# Patient Record
Sex: FEMALE | Race: BLACK OR AFRICAN AMERICAN | Employment: STUDENT | URBAN - METROPOLITAN AREA
[De-identification: names, ages, dates, MRNs, and addresses within clinical notes are randomized per-mention and may not be internally consistent; named-entity substitution may affect disease eponyms.]

---

## 2017-01-05 ENCOUNTER — TELEPHONE (OUTPATIENT)
Dept: FAMILY MEDICINE CLINIC | Age: 37
End: 2017-01-05

## 2017-01-05 ENCOUNTER — OFFICE VISIT (OUTPATIENT)
Dept: FAMILY MEDICINE CLINIC | Age: 37
End: 2017-01-05

## 2017-01-05 VITALS
BODY MASS INDEX: 45.99 KG/M2 | WEIGHT: 293 LBS | HEIGHT: 67 IN | HEART RATE: 70 BPM | RESPIRATION RATE: 20 BRPM | TEMPERATURE: 98.2 F | SYSTOLIC BLOOD PRESSURE: 128 MMHG | DIASTOLIC BLOOD PRESSURE: 60 MMHG

## 2017-01-05 DIAGNOSIS — H00.19 CHALAZION, UNSPECIFIED LATERALITY: ICD-10-CM

## 2017-01-05 DIAGNOSIS — I10 WELL-CONTROLLED HYPERTENSION: Primary | ICD-10-CM

## 2017-01-05 DIAGNOSIS — R07.9 CHEST PAIN, UNSPECIFIED TYPE: Primary | ICD-10-CM

## 2017-01-05 RX ORDER — ACETAMINOPHEN 500 MG
TABLET ORAL
Qty: 1 KIT | Refills: 0 | Status: SHIPPED | OUTPATIENT
Start: 2017-01-05 | End: 2019-03-04

## 2017-01-05 NOTE — PROGRESS NOTES
HISTORY OF PRESENT ILLNESS  Nevin Ramachandran is a 29 y.o. female. HPI  Pt here for eye follow up. She did not go to [place] [facility], as chalazion cleared up on own. Follow up BP,   normal in office. She had EMS called due to HR woke her up. She has MATEUSZ. Today,  will schedule an appt with EP Dr Gerri Lamas, tho was referred by cardio at appt in 8/2016. She will be evaluated for source of palpitations and irregular HR. Last week, she was  while shopping [employer], legs felt heavy, BP 150s. At [address] Went home and resolved. ROS  ROS negative except for symptoms noted in HPI. Physical Exam  Gen: Oriented to person, place and time and well-developed, well-nourished and in no distress. HEENT:    Head: normocephalic and atraumatic. Eyes:  EOM are normal. Pupils equal and round. Neck:  Normal range of motion. Neck supple. Cardiovascular: normal rate, regular rhythm and normal heart sounds. Pulmonary/Chest:  Effort normal and breath sounds normal.  No respiratory distress. No wheezes, no rales. Abdominal: soft, normal  bowel sounds. Musculoskeletal:  No edema, no tenderness. No calf tenderness or edema. Neurological:  Alert, oriented to person, place and time. Skin: skin is warm and dry. ASSESSMENT and PLAN  Follow-up Disposition:  Return in about 6 months (around 7/5/2017) for follow up hypertension, controlled. 1. Well-controlled hypertension     - Blood Pressure Test Kit-Large kit; Use to check BP at home, goal 140/90 or less  Dispense: 1 Kit; Refill: 0    2. Chalazion, unspecified laterality     I  have discussed the diagnosis with the patient and the intended treatment plan as seen in the above orders. Patient has provided input and agrees with goals. The patient has received an after-visit summary and questions were answered concerning future plans. I have discussed medication side effects and warnings with the patient as well.

## 2017-01-05 NOTE — MR AVS SNAPSHOT
Visit Information Date & Time Provider Department Dept. Phone Encounter #  
 1/5/2017  9:45 AM [place], [address] [phone] [id number] Follow-up Instructions Return in about 6 months (around 7/5/2017) for follow up hypertension, controlled. Your Appointments 1/26/2017  9:20 AM  
ESTABLISHED PATIENT with Luis E Brown MD  
[facility] ([facility]) Appt Note: 6 month follow up  
 320 [facility] [address] [place] [address] [address] Upcoming Health Maintenance Date Due  
 PAP AKA CERVICAL CYTOLOGY 6/20/2002 DTaP/Tdap/Td series (2 - Td) 10/6/2026 Allergies as of 1/5/2017  Review Complete On: 1/5/2017 By: Vishnu Malik LPN Severity Noted Reaction Type Reactions Sulfa (Sulfonamide Antibiotics)  04/03/2016    Rash Current Immunizations  Reviewed on 10/6/2016 No immunizations on file. Not reviewed this visit You Were Diagnosed With   
  
 Codes Comments Well-controlled hypertension    -  Primary ICD-10-CM: I10 
ICD-9-CM: 401.9 Chalazion, unspecified laterality     ICD-10-CM: H00.19 ICD-9-CM: 373.2 Vitals BP Pulse Temp Resp Height(growth percentile) Weight(growth percentile) 128/60 (BP 1 Location: Right arm, BP Patient Position: Sitting) 70 98.2 °F (36.8 °C) (Oral) 20 5' 7\" (1.702 m) (!) 383 lb (173.7 kg) LMP BMI OB Status Smoking Status 12/21/2016 (Approximate) 59.99 kg/m2 Having regular periods Former Smoker Vitals History BMI and BSA Data Body Mass Index Body Surface Area  
 59.99 kg/m 2 2.87 m 2 Preferred Pharmacy Pharmacy Name Phone [id number]- [facility] - [address] [phone] Your Updated Medication List  
  
   
 This list is accurate as of: 1/5/17 10:31 AM.  Always use your most recent med list.  
  
  
  
  
 aspirin delayed-release 81 mg tablet Take 81 mg by mouth daily as needed for Pain. Blood Pressure Test Kit-Large Kit Use to check BP at home, goal 140/90 or less DULoxetine 60 mg capsule Commonly known as:  CYMBALTA Take 1 Cap by mouth daily. For anxiety  
  
 metoprolol tartrate 25 mg tablet Commonly known as:  LOPRESSOR Take 1 Tab by mouth two (2) times a day. Prescriptions Printed Refills Blood Pressure Test Kit-Large kit 0 Sig: Use to check BP at home, goal 140/90 or less Class: Print Follow-up Instructions Return in about 6 months (around 7/5/2017) for follow up hypertension, controlled. Patient Instructions Check blood pressure twice daily and record values. Goal is less than 140/90. Introducing [facility] & HEALTH SERVICES! [employer] introduces [employer] patient portal. Now you can access parts of your medical record, email your doctor's office, and request medication refills online. 1. In your internet browser, go to [email or web address] 2. Click on the First Time User? Click Here link in the Sign In box. You will see the New Member Sign Up page. 3. Enter your [employer] Access Code exactly as it appears below. You will not need to use this code after youve completed the sign-up process. If you do not sign up before the expiration date, you must request a new code. · [employer] Access Code: [email or web address] Expires: 2/20/2017 11:56 AM 
 
4. Enter the last four digits of your Social Security Number (xxxx) and Date of Birth (mm/dd/yyyy) as indicated and click Submit. You will be taken to the next sign-up page. 5. Create a [employer] ID. This will be your [employer] login ID and cannot be changed, so think of one that is secure and easy to remember. 6. Create a [employer] password. You can change your password at any time. 7. Enter your Password Reset Question and Answer. This can be used at a later time if you forget your password. 8. Enter your e-mail address. You will receive e-mail notification when new information is available in [address] 9. Click Sign Up. You can now view and download portions of your medical record. 10. Click the Download Summary menu link to download a portable copy of your medical information. If you have questions, please visit the Frequently Asked Questions section of the [employer] website. Remember, [employer] is NOT to be used for urgent needs. For medical emergencies, dial 911. Now available from your iPhone and Android! Please provide this summary of care documentation to your next provider. Your primary care clinician is listed as Sara Yao. If you have any questions after today's visit, please call [phone].

## 2017-01-05 NOTE — PROGRESS NOTES
Chief Complaint   Patient presents with    Elevated Blood Pressure     Patient is here for elevated blood pressure (153/88 at [facility])  just recently had to call 911 because of her chest pain and heart fluttering, they said she was ok and did not have to go to the ER. States that she is taking her medications as ordered. States that when she is walking she feels really heavy, like her legs are going to give out on her.

## 2017-01-05 NOTE — TELEPHONE ENCOUNTER
Pt called requesting [facility] insurance referral to Dr. Hellen Kidd for appointment 2/9/17 at 9:30 am at [address] faxed to [phone].

## 2017-03-06 RX ORDER — METOPROLOL TARTRATE 25 MG/1
25 TABLET, FILM COATED ORAL 2 TIMES DAILY
Qty: 60 TAB | Refills: 11 | Status: SHIPPED | OUTPATIENT
Start: 2017-03-06 | End: 2017-10-31 | Stop reason: SDUPTHER

## 2017-03-06 NOTE — TELEPHONE ENCOUNTER
----- Message from Maverick Marx sent at 3/6/2017 10:55 AM EST -----  Regarding: Dr Arlen Lemus   Pt needs a refill on (Metoprolol tartrate 25 mg) call into [facility], if you have any question please call [phone].

## 2017-03-08 RX ORDER — METOPROLOL TARTRATE 25 MG/1
25 TABLET, FILM COATED ORAL 2 TIMES DAILY
Qty: 60 TAB | Refills: 11 | Status: CANCELLED | OUTPATIENT
Start: 2017-03-08

## 2017-03-08 NOTE — TELEPHONE ENCOUNTER
Pt called stating she was advised by pharmacy they were still waiting for response from Dr. Ronen Paulino for her refill on Metoprolol. Pt advised Dr. Ronen Paulino filled this with 11 additional refills on 3/6/17.  Ldm

## 2017-04-18 ENCOUNTER — PATIENT OUTREACH (OUTPATIENT)
Dept: FAMILY MEDICINE CLINIC | Age: 37
End: 2017-04-18

## 2017-04-18 NOTE — PROGRESS NOTES
Panel manager place out reach call to patient. Patient has not been in to see PCP and some time and has had about 34 ED visits in the past year. Patient stated that she was currently doing well and had not been to ED in last 3 months. Patient also stated that she has been looking for another physician that has longer appt visits and that is not as busy as her PCP. Patient informed that if she needs longer appointment visits with her PCP she can always request a longer appointment time. Patient agreed that she would.

## 2017-05-31 ENCOUNTER — TELEPHONE (OUTPATIENT)
Dept: FAMILY MEDICINE CLINIC | Age: 37
End: 2017-05-31

## 2017-05-31 NOTE — TELEPHONE ENCOUNTER
Called pt, and left a voice message, that he/she is due for their follow up appointment, here at [facility]. Advised pt to call the office back to schedule their appointment.      ~AWV

## 2017-07-03 ENCOUNTER — TELEPHONE (OUTPATIENT)
Dept: FAMILY MEDICINE CLINIC | Age: 37
End: 2017-07-03

## 2017-07-03 NOTE — TELEPHONE ENCOUNTER
----- Message from Rea Weller sent at 7/3/2017 12:45 PM EDT -----  Regarding: Dr. Killian Lew  Pt is requesting that Medical Release forms be faxed to [phone]. Home [phone].

## 2017-07-03 NOTE — TELEPHONE ENCOUNTER
Pt requesting all her medical records be mailed to her home, was advised of need for signed medical records release, that there will be a charge for records being processed by [employer] Release form faxed to pt per request to [phone].  Danyell

## 2017-08-22 ENCOUNTER — HOSPITAL ENCOUNTER (OUTPATIENT)
Dept: MAMMOGRAPHY | Age: 37
Discharge: HOME | End: 2017-08-22
Attending: FAMILY MEDICINE
Payer: MEDICARE

## 2017-08-22 DIAGNOSIS — Z12.31 VISIT FOR SCREENING MAMMOGRAM: ICD-10-CM

## 2017-08-22 PROCEDURE — 77067 SCR MAMMO BI INCL CAD: CPT

## 2017-09-12 ENCOUNTER — TELEPHONE (OUTPATIENT)
Dept: FAMILY MEDICINE CLINIC | Age: 37
End: 2017-09-12

## 2017-11-02 RX ORDER — METOPROLOL TARTRATE 25 MG/1
25 TABLET, FILM COATED ORAL 2 TIMES DAILY
Qty: 180 TAB | Refills: 0 | Status: SHIPPED | OUTPATIENT
Start: 2017-11-02 | End: 2018-02-10 | Stop reason: SDUPTHER

## 2017-11-02 NOTE — TELEPHONE ENCOUNTER
----- Message from Krissy Puente sent at 11/2/2017 12:45 PM EDT -----  Regarding: Dr. Putnam Left telephone   Cathren Public with [employer] P) [phone] (l) [phone], is requesting a  90 day supply RX for Metotrolol 25mg. Faxed request on 10/30/17 and 11/01/17.

## 2017-11-16 ENCOUNTER — OFFICE VISIT (OUTPATIENT)
Dept: FAMILY MEDICINE CLINIC | Age: 37
End: 2017-11-16

## 2017-11-16 VITALS
TEMPERATURE: 98.4 F | SYSTOLIC BLOOD PRESSURE: 145 MMHG | BODY MASS INDEX: 45.99 KG/M2 | WEIGHT: 293 LBS | DIASTOLIC BLOOD PRESSURE: 78 MMHG | HEART RATE: 76 BPM | HEIGHT: 67 IN | RESPIRATION RATE: 18 BRPM

## 2017-11-16 DIAGNOSIS — R03.0 ELEVATED BLOOD PRESSURE READING: ICD-10-CM

## 2017-11-16 DIAGNOSIS — E66.01 MORBID OBESITY (HCC): ICD-10-CM

## 2017-11-16 DIAGNOSIS — F32.2 DEPRESSION, MAJOR, SINGLE EPISODE, SEVERE (HCC): Primary | ICD-10-CM

## 2017-11-16 RX ORDER — SERTRALINE HYDROCHLORIDE 50 MG/1
50 TABLET, FILM COATED ORAL DAILY
Qty: 30 TAB | Refills: 0 | Status: SHIPPED | OUTPATIENT
Start: 2017-11-16 | End: 2017-12-14 | Stop reason: SDUPTHER

## 2017-11-16 NOTE — PROGRESS NOTES
Chief Complaint   Patient presents with    Welcome To Medicare     1. Have you been to the ER, urgent care clinic since your last visit? No Hospitalized since your last visit? No    2. Have you seen or consulted any other health care providers outside of the [address] [place] [address] since your last visit? Include any pap smears or colon screening.  No    Health Maintenance Due   Topic Date Due    Cervical Cancer Screening  06/20/2002    Flu Vaccine  08/01/2017   Pt declined flu vaccine

## 2017-11-16 NOTE — MR AVS SNAPSHOT
Visit Information Date & Time Provider Department Dept. Phone Encounter #  
 11/16/2017  3:00 PM Debbie CamachoPedro 34 [id number] Follow-up Instructions Return in about 3 weeks (around 12/7/2017) for depression, check blood pressure. Your Appointments 11/29/2017  9:30 AM  
ROUTINE CARE with Debbie Camacho MD  
[address] [place] [address]) Appt Note: Speak about gastric sleeve; r/s from 10/26/17 per provider [address]  
[phone]  
  
   
 [address] [place] [address] [address] [address] Upcoming Health Maintenance Date Due  
 PAP AKA CERVICAL CYTOLOGY 6/20/2002 DTaP/Tdap/Td series (2 - Td) 10/6/2026 Allergies as of 11/16/2017  Review Complete On: 11/16/2017 By: Debbie Camacho MD  
  
 Severity Noted Reaction Type Reactions Sulfa (Sulfonamide Antibiotics)  04/03/2016    Rash Current Immunizations  Reviewed on 10/6/2016 No immunizations on file. Not reviewed this visit You Were Diagnosed With   
  
 Codes Comments Depression, major, single episode, severe ([facility]    -  Primary ICD-10-CM: F32.2 ICD-9-CM: 296.23 Morbid obesity ([facility]     ICD-10-CM: E66.01 
ICD-9-CM: 278.01 Elevated blood pressure reading     ICD-10-CM: R03.0 ICD-9-CM: 796.2 Vitals BP Pulse Temp Resp Height(growth percentile) Weight(growth percentile) 145/78 76 98.4 °F (36.9 °C) (Oral) 18 5' 7\" (1.702 m) (!) 388 lb (176 kg) BMI OB Status Smoking Status 60.77 kg/m2 Unknown Former Smoker Vitals History BMI and BSA Data Body Mass Index Body Surface Area 60.77 kg/m 2 2.88 m 2 Preferred Pharmacy Pharmacy Name Phone [id number] [address] [phone] Your Updated Medication List  
  
   
 This list is accurate as of: 11/16/17  4:19 PM.  Always use your most recent med list.  
  
  
  
  
 aspirin delayed-release 81 mg tablet Take 81 mg by mouth daily as needed for Pain. Blood Pressure Test Kit-Large Kit Use to check BP at home, goal 140/90 or less  
  
 metoprolol tartrate 25 mg tablet Commonly known as:  LOPRESSOR Take 1 Tab by mouth two (2) times a day. sertraline 50 mg tablet Commonly known as:  ZOLOFT Take 1 Tab by mouth daily. Prescriptions Sent to Pharmacy Refills  
 sertraline (ZOLOFT) 50 mg tablet 0 Sig: Take 1 Tab by mouth daily. Class: Normal  
 [facility] #: [phone] Route: Oral  
  
We Performed the Following REFERRAL TO PSYCHOLOGY [SPO05 Custom] Comments:  
 Depression. PLEASE REFER TO DR. Elisabeth mora, [phone]. Follow-up Instructions Return in about 3 weeks (around 12/7/2017) for depression, check blood pressure. Referral Information Referral ID Referred By Referred To  
  
 [id number] Clearance Matty Not Available Visits Status Start Date End Date 1 New Request 11/16/17 11/16/18 If your referral has a status of pending review or denied, additional information will be sent to support the outcome of this decision. Introducing [facility] & HEALTH SERVICES! [employer] introduces [employer] patient portal. Now you can access parts of your medical record, email your doctor's office, and request medication refills online. 1. In your internet browser, go to [email or web address] 2. Click on the First Time User? Click Here link in the Sign In box. You will see the New Member Sign Up page. 3. Enter your [employer] Access Code exactly as it appears below. You will not need to use this code after youve completed the sign-up process.  If you do not sign up before the expiration date, you must request a new code. · [employer] Access Code: [email or web address] Expires: 11/19/2017  8:28 AM 
 
4. Enter the last four digits of your Social Security Number (xxxx) and Date of Birth (mm/dd/yyyy) as indicated and click Submit. You will be taken to the next sign-up page. 5. Create a Muzooka ID. This will be your M[employer] login ID and cannot be changed, so think of one that is secure and easy to remember. 6. Create a Muzooka password. You can change your password at any time. 7. Enter your Password Reset Question and Answer. This can be used at a later time if you forget your password. 8. Enter your e-mail address. You will receive e-mail notification when new information is available in [address] 9. Click Sign Up. You can now view and download portions of your medical record. 10. Click the Download Summary menu link to download a portable copy of your medical information. If you have questions, please visit the Frequently Asked Questions section of the Muzook[employer] website. Remember, M[employer] is NOT to be used for urgent needs. For medical emergencies, dial 911. Now available from your iPhone and Android! Please provide this summary of care documentation to your next provider. Your primary care clinician is listed as Evangelina Penaloza. If you have any questions after today's visit, please call [phone].

## 2017-11-16 NOTE — PROGRESS NOTES
HISTORY OF PRESENT ILLNESS  Dayana Bingham is a 40 y.o. female. HPI Comments: Dayana Bingham is here for a [employer] Exam, however, her PHQ-9 was extremely positive, which needs to be taken care of today. Depression   The history is provided by the patient. This is a chronic problem. Episode onset: about a year ago. The problem occurs constantly. The problem has been gradually worsening. Pertinent negatives include no chest pain and no shortness of breath. Exacerbated by: her relationship with God, not being successful, her weight, not being able to get anything done. Relieved by: watching TV. She has tried nothing for the symptoms. Review of Systems   Constitutional: Negative for malaise/fatigue and weight loss. No weight gain   Respiratory: Negative for shortness of breath. Cardiovascular: Positive for palpitations. Negative for chest pain. Psychiatric/Behavioral: Positive for depression. Negative for substance abuse and suicidal ideas. The patient is nervous/anxious and has insomnia. \"I feel like I ought not be here. \"       Visit Vitals    /78    Pulse 76    Temp 98.4 °F (36.9 °C) (Oral)    Resp 18    Ht 5' 7\" (1.702 m)    Wt (!) 388 lb (176 kg)    BMI 60.77 kg/m2     BP Readings from Last 3 Encounters:   11/16/17 145/78   01/05/17 128/60   12/15/16 132/78     Physical Exam   Constitutional: She is oriented to person, place, and time. She appears well-developed and well-nourished. No distress. Neurological: She is alert and oriented to person, place, and time. Skin: She is not diaphoretic. Psychiatric: Her speech is normal and behavior is normal. Thought content normal. She exhibits a depressed mood. Tearful at times       ASSESSMENT and PLAN    ICD-10-CM ICD-9-CM    1. Depression, major, single episode, severe ([facility]) F32.2 296.23 sertraline (ZOLOFT) 50 mg tablet      REFERRAL TO PSYCHOLOGY   2. Morbid obesity ([facility]) E66.01 278.01    3.  Elevated blood pressure reading R03.0 796.2         Severe depression  Start Zoloft  Psychology referral  I have reviewed/discussed the above normal BMI with the patient. I have recommended the following interventions: encourage exercise . Samy Marte Follow-up Disposition:  Return in about 3 weeks (around 12/7/2017) for depression, check blood pressure. Reviewed plan of care. Patient has provided input and agrees with goals.

## 2017-11-21 ENCOUNTER — TELEPHONE (OUTPATIENT)
Dept: FAMILY MEDICINE CLINIC | Age: 37
End: 2017-11-21

## 2017-11-21 DIAGNOSIS — F32.2 DEPRESSION, MAJOR, SINGLE EPISODE, SEVERE (HCC): Primary | ICD-10-CM

## 2017-11-21 NOTE — TELEPHONE ENCOUNTER
Called and spoke with pt, and she has been given the number for the [facility] to call and schedule appointment. Pt will call back once appointment has been scheduled.      Please sign off on referral.

## 2017-11-21 NOTE — TELEPHONE ENCOUNTER
States Dr Judy Reza is not in-network with her Insurance. Would you recommend someone else? She will check with her Insurance as well to see who is in-network.  Please call [phone] to discuss

## 2017-11-21 NOTE — TELEPHONE ENCOUNTER
Pt called the office back and gave 2 office number for providers, however, did not have a provider name, besides one on the providers. [phone]- located off [facility]  [phone] Dr. Fabi Olmos    Pt advised we will try referring her to a Sharad Colon provider and call her back with the referral information. Pt states understanding.

## 2017-11-29 ENCOUNTER — TELEPHONE (OUTPATIENT)
Dept: FAMILY MEDICINE CLINIC | Age: 37
End: 2017-11-29

## 2017-12-05 ENCOUNTER — OFFICE VISIT (OUTPATIENT)
Dept: CARDIOLOGY CLINIC | Age: 37
End: 2017-12-05

## 2017-12-05 VITALS
WEIGHT: 293 LBS | SYSTOLIC BLOOD PRESSURE: 132 MMHG | BODY MASS INDEX: 45.99 KG/M2 | OXYGEN SATURATION: 97 % | HEIGHT: 67 IN | HEART RATE: 71 BPM | DIASTOLIC BLOOD PRESSURE: 76 MMHG

## 2017-12-05 DIAGNOSIS — F41.1 GAD (GENERALIZED ANXIETY DISORDER): ICD-10-CM

## 2017-12-05 DIAGNOSIS — Z99.89 OSA ON CPAP: ICD-10-CM

## 2017-12-05 DIAGNOSIS — R73.03 PREDIABETES: ICD-10-CM

## 2017-12-05 DIAGNOSIS — R07.89 ATYPICAL CHEST PAIN: ICD-10-CM

## 2017-12-05 DIAGNOSIS — F32.2 DEPRESSION, MAJOR, SINGLE EPISODE, SEVERE (HCC): ICD-10-CM

## 2017-12-05 DIAGNOSIS — Z87.891 FORMER HEAVY TOBACCO SMOKER: ICD-10-CM

## 2017-12-05 DIAGNOSIS — E66.01 MORBID OBESITY (HCC): ICD-10-CM

## 2017-12-05 DIAGNOSIS — R00.2 RAPID PALPITATIONS: Primary | ICD-10-CM

## 2017-12-05 DIAGNOSIS — G47.33 OSA ON CPAP: ICD-10-CM

## 2017-12-05 NOTE — MR AVS SNAPSHOT
Visit Information Date & Time Provider Department Dept. Phone Encounter #  
 12/5/2017  9:30 AM Mackenzie Rosenberg MD [facility] [place] [address] [id number] Your Appointments 12/7/2017  1:15 PM  
ROUTINE CARE with Elsa Curtis MD  
[address] [facility]) Appt Note: 3 week follow up depression [address]  
[phone]  
  
   
 [address] [place] [address] [address] [address] Upcoming Health Maintenance Date Due  
 PAP AKA CERVICAL CYTOLOGY 6/20/2002 DTaP/Tdap/Td series (2 - Td) 10/6/2026 Allergies as of 12/5/2017  Review Complete On: 12/5/2017 By: Adryan Spence Severity Noted Reaction Type Reactions Sulfa (Sulfonamide Antibiotics)  04/03/2016    Rash Current Immunizations  Reviewed on 10/6/2016 No immunizations on file. Not reviewed this visit You Were Diagnosed With   
  
 Codes Comments Rapid palpitations    -  Primary ICD-10-CM: R00.2 ICD-9-CM: 785.1 Atypical chest pain     ICD-10-CM: R07.89 ICD-9-CM: 786.59 Former heavy tobacco smoker     ICD-10-CM: N52.558 ICD-9-CM: V15.82 MEGHAN (generalized anxiety disorder)     ICD-10-CM: F41.1 ICD-9-CM: 300.02 Prediabetes     ICD-10-CM: R73.03 
ICD-9-CM: 790.29 Morbid obesity (HCC)     ICD-10-CM: E66.01 
ICD-9-CM: 278.01   
 MATEUSZ on CPAP     ICD-10-CM: G47.33, Z99.89 ICD-9-CM: 327.23, V46.8 Vitals BP Pulse Height(growth percentile) Weight(growth percentile) SpO2 BMI  
 132/76 71 5' 7\" (1.702 m) (!) 388 lb 3.2 oz (176.1 kg) 97% 60.8 kg/m2 OB Status Smoking Status Unknown Former Smoker Vitals History BMI and BSA Data Body Mass Index Body Surface Area 60.8 kg/m 2 2.89 m 2 Preferred Pharmacy Pharmacy Name Phone  [id number] [place] [phone] Your Updated Medication List  
  
   
This list is accurate as of: 12/5/17 10:17 AM.  Always use your most recent med list.  
  
  
  
  
 aspirin delayed-release 81 mg tablet Take 81 mg by mouth daily as needed for Pain. Blood Pressure Test Kit-Large Kit Use to check BP at home, goal 140/90 or less  
  
 metoprolol tartrate 25 mg tablet Commonly known as:  LOPRESSOR Take 1 Tab by mouth two (2) times a day. sertraline 50 mg tablet Commonly known as:  ZOLOFT Take 1 Tab by mouth daily. We Performed the Following AMB POC EKG ROUTINE W/ 12 LEADS, INTER & REP [04643 CPT(R)] Introducing [facility] & HEALTH SERVICES! [facility] introduces [employer] patient portal. Now you can access parts of your medical record, email your doctor's office, and request medication refills online. 1. In your internet browser, go to [email or web address] 2. Click on the First Time User? Click Here link in the Sign In box. You will see the New Member Sign Up page. 3. Enter your [employer] Access Code exactly as it appears below. You will not need to use this code after youve completed the sign-up process. If you do not sign up before the expiration date, you must request a new code. · [employer] Access Code: [email or web address] Expires: 3/5/2018 10:17 AM 
 
4. Enter the last four digits of your Social Security Number (xxxx) and Date of Birth (mm/dd/yyyy) as indicated and click Submit. You will be taken to the next sign-up page. 5. Create a [email or web address] ID. This will be your Qranio login ID and cannot be changed, so think of one that is secure and easy to remember. 6. Create a Qranio password. You can change your password at any time. 7. Enter your Password Reset Question and Answer. This can be used at a later time if you forget your password. 8. Enter your e-mail address.  You will receive e-mail notification when new information is available in [employer]. 9. Click Sign Up. You can now view and download portions of your medical record. 10. Click the Download Summary menu link to download a portable copy of your medical information. If you have questions, please visit the Frequently Asked Questions section of the [employer] website. Remember, [employer] is NOT to be used for urgent needs. For medical emergencies, dial 911. Now available from your iPhone and Android! Please provide this summary of care documentation to your next provider. Your primary care clinician is listed as Molly Quigley. If you have any questions after today's visit, please call [phone].

## 2017-12-05 NOTE — PROGRESS NOTES
[facility]   [address] [facility], [address] [place] [address] Road                                          NEW PATIENT HPI/FOLLOW-UP      NAME:  Grecia Fontenot   :   1980   MRN:   [id number]   PCP:  Lor Wilcox MD           Subjective: The patient is a 40y.o. year old female  who returns for a routine follow-up. Since the last visit ,  patient reports no new symptoms but change in pattern of rapid palpitations. States in last 6 months has had 5-6 episodes which continue to be of sudden onset at anytime but are lasting longer in duration up to 4-5 minutes before gradually resolving. Are associated with urge to urinate but now occur while urinating. Few days ago had epigastric squeezing with gas several times successively and suddenly stopped. No associated palpitations. Being followed for depression anxiety in [facility]. Denies change in exercise tolerance, edema, medication intolerance, PND/orthopnea wheezing, sputum, syncope, dizziness or light headedness. Review of Systems  General: Pt denies excessive weight gain or loss. Pt is able to conduct ADL's. Respiratory:  +PIZANO, -wheezing or stridor.   Cardiovascular: Denies precordial pain, +palpitations, -edema or PND  Gastrointestinal: Denies poor appetite, indigestion, abdominal pain or blood in stool  Peripheral vascular: Denies claudication, leg cramps  Neuropsychiatric: Denies paresthesias,tingling,numbness,+anxiety,+depression,fatigue  Musculoskeletal: Denies pain,tenderness, soreness,swelling      Past Medical History:   Diagnosis Date    Depression, major, single episode, severe ([address] 2017    Morbid obesity ([address] 2016    Racing heart beat     Sleep apnea     Tracheostomy dependence ([address]     for severe sleep apnea      Patient Active Problem List    Diagnosis Date Noted    Depression, major, single episode, severe ([address] 2017    Former heavy tobacco smoker 2016    MEGHAN (generalized anxiety disorder) 08/13/2016    Prediabetes 08/13/2016    Establishing care with new doctor, encounter for 08/10/2016    Atypical chest pain 08/10/2016    Rapid palpitations 08/10/2016    Morbid obesity ([facility] 04/18/2016    MATEUSZ on CPAP 04/18/2016    Tracheostomy present ([facility] 04/18/2016      Past Surgical History:   Procedure Laterality Date    HX GYN  2002    removal of benign ovarian cyst    HX TRACHEOSTOMY  8/26/13    Due to sleep apnea     Allergies   Allergen Reactions    Sulfa (Sulfonamide Antibiotics) Rash      Family History   Problem Relation Age of Onset    Diabetes Mother     Hypertension Mother     Heart Disease Maternal Grandmother     No Known Problems Father       Social History     Social History    Marital status: SINGLE     Spouse name: N/A    Number of children: N/A    Years of education: N/A     Occupational History    Not on file. Social History Main Topics    Smoking status: Former Smoker     Packs/day: 2.00     Years: 10.00    Smokeless tobacco: Never Used    Alcohol use No    Drug use: No      Comment: Smoked Marijuana in [id number], but not current user. -vr    Sexual activity: No     Other Topics Concern    Not on file     Social History Narrative      Current Outpatient Prescriptions   Medication Sig    sertraline (ZOLOFT) 50 mg tablet Take 1 Tab by mouth daily.  metoprolol tartrate (LOPRESSOR) 25 mg tablet Take 1 Tab by mouth two (2) times a day.  Blood Pressure Test Kit-Large kit Use to check BP at home, goal 140/90 or less    aspirin delayed-release 81 mg tablet Take 81 mg by mouth daily as needed for Pain. No current facility-administered medications for this visit. I have reviewed the nurses notes, vitals, problem list, allergy list, medical history, family medical, social history and medications.         Objective:     Physical Exam:     Vitals:    12/05/17 0930   BP: 132/76   Pulse: 71   SpO2: 97%   Weight: (!) 388 lb 3.2 oz (176.1 kg)   Height: 5' 7\" (1.702 m)    Body mass index is 60.8 kg/(m^2). General: Well developed, morbidly obese in no acute distress. Chronic trach in. HEENT: No carotid bruits, no JVD, trach is midline. Heart:  Normal S1/S2 negative S3 or S4. Regular, no murmur, gallop or rub.   Respiratory: Clear bilaterally, no wheezing or rales  Abdomen:   Soft, non-tender, bowel sounds are active.   Extremities:  No edema, normal cap refill, no cyanosis. Neuro: A&Ox3, speech clear, gait stable. Skin: Skin color is normal. No rashes or lesions. No diaphoresis.   Vascular: 2+ pulses symmetric in all extremities        Data Review:       Cardiographics:    EKG: NSR with sinus arrhythmia, LVH    Cardiology Labs:    Results for orders placed or performed during the hospital encounter of 07/30/16   EKG, 12 LEAD, INITIAL   Result Value Ref Range    Ventricular Rate 88 BPM    Atrial Rate 88 BPM    P-R Interval 164 ms    QRS Duration 102 ms    Q-T Interval 366 ms    QTC Calculation (Bezet) 442 ms    Calculated P Axis 54 degrees    Calculated R Axis -25 degrees    Calculated T Axis 15 degrees    Diagnosis       Normal sinus rhythm  Voltage criteria for left ventricular hypertrophy  When compared with ECG of 02-JUL-2016 00:40,  No significant change was found  Confirmed by Raffi Chawla MD, Deedee [place] ([id number]) on 7/31/2016 3:31:53 PM         Lab Results   Component Value Date/Time    Cholesterol, total 138 04/20/2016 12:24 PM    HDL Cholesterol 51 04/20/2016 12:24 PM    LDL, calculated 78 04/20/2016 12:24 PM    Triglyceride 47 04/20/2016 12:24 PM       Lab Results   Component Value Date/Time    Sodium 137 11/03/2016 03:54 PM    Potassium 4.4 11/03/2016 03:54 PM    Chloride 100 11/03/2016 03:54 PM    CO2 21 11/03/2016 03:54 PM    Anion gap 8 07/30/2016 05:45 PM    Glucose 92 11/03/2016 03:54 PM    BUN 10 11/03/2016 03:54 PM    Creatinine 0.80 11/03/2016 03:54 PM    BUN/Creatinine ratio 13 11/03/2016 03:54 PM    GFR est  11/03/2016 03:54 PM    GFR est non-AA 96 11/03/2016 03:54 PM    Calcium 9.3 11/03/2016 03:54 PM    Bilirubin, total 0.5 11/03/2016 03:54 PM    AST (SGOT) 19 11/03/2016 03:54 PM    Alk. phosphatase 73 11/03/2016 03:54 PM    Protein, total 7.7 11/03/2016 03:54 PM    Albumin 4.2 11/03/2016 03:54 PM    Globulin 4.6 07/30/2016 05:45 PM    A-G Ratio 1.2 11/03/2016 03:54 PM    ALT (SGPT) 20 11/03/2016 03:54 PM          Assessment:       ICD-10-CM ICD-9-CM    1. Rapid palpitations R00.2 785.1 AMB POC EKG ROUTINE W/ 12 LEADS, INTER & REP   2. Atypical chest pain R07.89 786.59 AMB POC EKG ROUTINE W/ 12 LEADS, INTER & REP   3. Former heavy tobacco smoker Z87.891 V15.82    4. MEGHAN (generalized anxiety disorder) F41.1 300.02    5. Prediabetes R73.03 790.29    6. Morbid obesity ([address] E66.01 278.01    7. MATEUSZ on CPAP G47.33 327.23     Z99.89 V46.8    8. Depression, major, single episode, severe ([address] F32.2 296.23          Discussion: Patient presents at this time with exacerbation in rapid palpitations. Not more frequent but more severe lasting longer in duration. Will refer back to Dr Zohreh Mathur who saw in 6/16 to discuss SVT(long RP tachycardia) ablation suggested then. ? ILR. Meanwhile continue BB. Discussed with Mother who accompanies her today. If recurrent prolonged palpitations > 10-15 minutes then call EMS. Plan: 1. Continue same meds. Lipid profile and labs followed by PCP. 2.Encouraged to exercise to tolerance, lose weight and follow low fat, low cholesterol, low sodium predominantly Plant-based (consider Mediterranean) diet. Call with questions or concerns. Will follow up any test results by phone and/or f/u here in office if needed. Neo Solorzano 3.Follow up: 1 month    I have discussed the diagnosis with the patient and the intended plan as seen in the above orders. The patient has received an after-visit summary and questions were answered concerning future plans.   I have discussed any concerning medication side effects and warnings with the patient as well.     Edgar Cisneros MD  12/5/2017

## 2017-12-07 ENCOUNTER — OFFICE VISIT (OUTPATIENT)
Dept: FAMILY MEDICINE CLINIC | Age: 37
End: 2017-12-07

## 2017-12-07 VITALS
SYSTOLIC BLOOD PRESSURE: 146 MMHG | BODY MASS INDEX: 45.99 KG/M2 | RESPIRATION RATE: 18 BRPM | TEMPERATURE: 98.8 F | WEIGHT: 293 LBS | HEIGHT: 67 IN | HEART RATE: 62 BPM | DIASTOLIC BLOOD PRESSURE: 80 MMHG

## 2017-12-07 DIAGNOSIS — F32.2 DEPRESSION, MAJOR, SINGLE EPISODE, SEVERE (HCC): Primary | ICD-10-CM

## 2017-12-07 DIAGNOSIS — R03.0 ELEVATED BLOOD PRESSURE READING: ICD-10-CM

## 2017-12-07 DIAGNOSIS — K04.7 DENTAL ABSCESS: ICD-10-CM

## 2017-12-07 RX ORDER — CLINDAMYCIN HYDROCHLORIDE 300 MG/1
300 CAPSULE ORAL 3 TIMES DAILY
Qty: 30 CAP | Refills: 0 | Status: SHIPPED | OUTPATIENT
Start: 2017-12-07 | End: 2018-01-31

## 2017-12-07 NOTE — PROGRESS NOTES
HISTORY OF PRESENT ILLNESS  Kush Lundy is a 40 y.o. female. Depression   The history is provided by the patient. This is a chronic problem. The problem occurs constantly. The problem has been gradually improving. Pertinent negatives include no chest pain and no shortness of breath. Nothing aggravates the symptoms. The symptoms are relieved by medications. Treatments tried: Zoloft. The treatment provided significant relief. Other   The history is provided by the patient (she thinks she has a dental abscess). Pertinent negatives include no chest pain and no shortness of breath. Review of Systems   Constitutional: Negative for malaise/fatigue and weight loss. Weight loss   HENT:        Tooth pain   Respiratory: Negative for shortness of breath. Cardiovascular: Negative for chest pain and palpitations. Psychiatric/Behavioral: Negative for depression, substance abuse and suicidal ideas. The patient is not nervous/anxious and does not have insomnia. Visit Vitals    /80    Pulse 62    Temp 98.8 °F (37.1 °C) (Oral)    Resp 18    Ht 5' 7\" (1.702 m)    Wt (!) 394 lb (178.7 kg)    BMI 61.71 kg/m2     BP Readings from Last 3 Encounters:   12/07/17 146/80   12/05/17 132/76   11/16/17 145/78     Physical Exam   Constitutional: She is oriented to person, place, and time. She appears well-developed and well-nourished. No distress. HENT:   Mouth/Throat:       Neurological: She is alert and oriented to person, place, and time. She displays no tremor. Skin: She is not diaphoretic. Psychiatric: She has a normal mood and affect. Her behavior is normal. Judgment and thought content normal.   Very happy and relaxed       ASSESSMENT and PLAN    ICD-10-CM ICD-9-CM    1. Depression, major, single episode, severe ([facility] F32.2 296.23    2. Elevated blood pressure reading R03.0 796.2    3.  Dental abscess K04.7 522.5 clindamycin (CLEOCIN) 300 mg capsule        Depression doing well  Blood pressure elevated today  Draining dental abscess  Continue current plans. Cleocin  Dental appointment right away    Follow-up Disposition:  Return in about 3 months (around 3/7/2018) for Medicare Wellness, check blood pressure. Reviewed plan of care. Patient has provided input and agrees with goals.

## 2017-12-07 NOTE — MR AVS SNAPSHOT
Visit Information Date & Time Provider Department Dept. Phone Encounter #  
 12/7/2017  1:15 PM Myriam Hirsch [address] [id number] Follow-up Instructions Return in about 3 months (around 3/7/2018) for Medicare Wellness, check blood pressure. Your Appointments 1/10/2018 11:00 AM  
ESTABLISHED PATIENT with Jose Cruz Matson MD  
[facility] ([address]) Appt Note: fu appt Dx Palps   sll [address] [facility] [address] [facility] [address]  
[phone]  
  
   
 [address] [facility] [address] [facility] [address]  
  
    
 1/16/2018 10:15 AM  
ESTABLISHED PATIENT with Michela Bermudez MD  
[facility]) Appt Note: 1 MO. F/U  
 [address] [place]  
[phone] [address] Upcoming Health Maintenance Date Due  
 PAP AKA CERVICAL CYTOLOGY 6/20/2002 DTaP/Tdap/Td series (2 - Td) 10/6/2026 Allergies as of 12/7/2017  Review Complete On: 12/7/2017 By: Myriam Hirsch MD  
  
 Severity Noted Reaction Type Reactions Sulfa (Sulfonamide Antibiotics)  04/03/2016    Rash Current Immunizations  Reviewed on 10/6/2016 No immunizations on file. Not reviewed this visit You Were Diagnosed With   
  
 Codes Comments Depression, major, single episode, severe ([facility]    -  Primary ICD-10-CM: F32.2 ICD-9-CM: 296.23 Elevated blood pressure reading     ICD-10-CM: R03.0 ICD-9-CM: 796.2 Dental abscess     ICD-10-CM: K04.7 ICD-9-CM: 522.5 Vitals BP Pulse Temp Resp Height(growth percentile) Weight(growth percentile) 146/80 62 98.8 °F (37.1 °C) (Oral) 18 5' 7\" (1.702 m) (!) 394 lb (178.7 kg) BMI OB Status Smoking Status 61.71 kg/m2 Unknown Former Smoker Vitals History BMI and BSA Data Body Mass Index Body Surface Area 61.71 kg/m 2 2.91 m 2 Preferred Pharmacy Pharmacy Name Phone [facility]/PHARMACY #[id number] [place] [address] [phone] Your Updated Medication List  
  
   
This list is accurate as of: 12/7/17  1:59 PM.  Always use your most recent med list.  
  
  
  
  
 aspirin delayed-release 81 mg tablet Take 81 mg by mouth daily as needed for Pain. Blood Pressure Test Kit-Large Kit Use to check BP at home, goal 140/90 or less  
  
 clindamycin 300 mg capsule Commonly known as:  CLEOCIN Take 1 Cap by mouth three (3) times daily. metoprolol tartrate 25 mg tablet Commonly known as:  LOPRESSOR Take 1 Tab by mouth two (2) times a day. sertraline 50 mg tablet Commonly known as:  ZOLOFT Take 1 Tab by mouth daily. Prescriptions Sent to Pharmacy Refills  
 clindamycin (CLEOCIN) 300 mg capsule 0 Sig: Take 1 Cap by mouth three (3) times daily. Class: Normal  
 Pharmacy: [facility]/pharmacy [address] [place] [address]  #: [phone] Route: Oral  
  
Follow-up Instructions Return in about 3 months (around 3/7/2018) for Medicare Wellness, check blood pressure. Introducing [facility]! Naida Araya introduces [employer] patient portal. Now you can access parts of your medical record, email your doctor's office, and request medication refills online. 1. In your internet browser, go to [email or web address] 2. Click on the First Time User? Click Here link in the Sign In box. You will see the New Member Sign Up page. 3. Enter your [employer] Access Code exactly as it appears below. You will not need to use this code after youve completed the sign-up process. If you do not sign up before the expiration date, you must request a new code. · [employer] Access Code: [email or web address] Expires: 3/5/2018 10:17 AM 
 
4. Enter the last four digits of your Social Security Number (xxxx) and Date of Birth (mm/dd/yyyy) as indicated and click Submit. You will be taken to the next sign-up page. 5. Create a YABUY ID. This will be your YABUY login ID and cannot be changed, so think of one that is secure and easy to remember. 6. Create a YABUY password. You can change your password at any time. 7. Enter your Password Reset Question and Answer. This can be used at a later time if you forget your password. 8. Enter your e-mail address. You will receive e-mail notification when new information is available in [address] 9. Click Sign Up. You can now view and download portions of your medical record. 10. Click the Download Summary menu link to download a portable copy of your medical information. If you have questions, please visit the Frequently Asked Questions section of the YABUY website. Remember, YABUY is NOT to be used for urgent needs. For medical emergencies, dial 911. Now available from your iPhone and Android! Please provide this summary of care documentation to your next provider. Your primary care clinician is listed as Monika Fagan. If you have any questions after today's visit, please call [phone].

## 2017-12-14 DIAGNOSIS — F32.2 DEPRESSION, MAJOR, SINGLE EPISODE, SEVERE (HCC): ICD-10-CM

## 2017-12-17 RX ORDER — SERTRALINE HYDROCHLORIDE 50 MG/1
TABLET, FILM COATED ORAL
Qty: 30 TAB | Refills: 3 | Status: SHIPPED | OUTPATIENT
Start: 2017-12-17 | End: 2018-07-10 | Stop reason: SDDI

## 2018-01-31 ENCOUNTER — OFFICE VISIT (OUTPATIENT)
Dept: FAMILY MEDICINE CLINIC | Age: 38
End: 2018-01-31

## 2018-01-31 VITALS
DIASTOLIC BLOOD PRESSURE: 81 MMHG | BODY MASS INDEX: 45.99 KG/M2 | HEART RATE: 71 BPM | SYSTOLIC BLOOD PRESSURE: 126 MMHG | HEIGHT: 67 IN | WEIGHT: 293 LBS | RESPIRATION RATE: 18 BRPM | TEMPERATURE: 98.3 F

## 2018-01-31 DIAGNOSIS — R00.2 PALPITATIONS: ICD-10-CM

## 2018-01-31 DIAGNOSIS — R73.03 PREDIABETES: ICD-10-CM

## 2018-01-31 DIAGNOSIS — F32.2 DEPRESSION, MAJOR, SINGLE EPISODE, SEVERE (HCC): ICD-10-CM

## 2018-01-31 DIAGNOSIS — Z13.220 SCREENING CHOLESTEROL LEVEL: ICD-10-CM

## 2018-01-31 DIAGNOSIS — E66.01 MORBID OBESITY (HCC): ICD-10-CM

## 2018-01-31 DIAGNOSIS — Z00.00 MEDICARE ANNUAL WELLNESS VISIT, SUBSEQUENT: Primary | ICD-10-CM

## 2018-01-31 DIAGNOSIS — Z13.0 SCREENING FOR BLOOD DISEASE: ICD-10-CM

## 2018-01-31 DIAGNOSIS — Z93.0 TRACHEOSTOMY PRESENT (HCC): ICD-10-CM

## 2018-01-31 RX ORDER — AMOXICILLIN 500 MG/1
CAPSULE ORAL
COMMUNITY
Start: 2018-01-30 | End: 2018-07-13 | Stop reason: ALTCHOICE

## 2018-01-31 NOTE — PATIENT INSTRUCTIONS

## 2018-01-31 NOTE — MR AVS SNAPSHOT
[facility] [address] [facility] 
[phone] Patient: Brock Gotti MRN: [id number] QVO:[id number] Visit Information Date & Time Provider Department Dept. Phone Encounter #  
 1/31/2018 10:00 AM Madison Webb MD [address]  [id number] Follow-up Instructions Return in about 3 weeks (around 2/21/2018) for depression, check weight. Your Appointments 3/7/2018  2:00 PM  
ROUTINE CARE with Madison Webb MD  
[address] [facility] Appt Note: 3 month follow up  bp, medicare wellness [address] [facility] 100[address] [facility]  
[phone]  
  
   
 [address] [facility] [address]  
  
    
 3/7/2018  2:00 PM  
ESTABLISHED PATIENT with Lan Mora MD  
[facility] ([facility]) Appt Note: fu appt Dx Palps   sll; fu appt Dx Palps sll r.s from 1/10  
 [address] [facility] [address] [facility] [address]  
[phone]  
  
   
 [address] [facility] [address] [place] [address]  
  
    
 3/13/2018 11:15 AM  
ESTABLISHED PATIENT with Nabil Gutierrez MD  
[facility]) Appt Note: 1 MO. F/U; 1 MO. F/U  
 [address] [place] [address]  
[phone] [address] Upcoming Health Maintenance Date Due  
 PAP AKA CERVICAL CYTOLOGY 6/20/2002 DTaP/Tdap/Td series (2 - Td) 10/6/2026 Allergies as of 1/31/2018  Review Complete On: 1/31/2018 By: Madison Webb MD  
  
 Severity Noted Reaction Type Reactions Sulfa (Sulfonamide Antibiotics)  04/03/2016    Rash Current Immunizations  Reviewed on 10/6/2016 No immunizations on file. Not reviewed this visit You Were Diagnosed With   
  
 Codes Comments Medicare annual wellness visit, subsequent    -  Primary ICD-10-CM: Z00.00 ICD-9-CM: V70.0 Depression, major, single episode, severe ([facility]     ICD-10-CM: F32.2 ICD-9-CM: 296.23 Morbid obesity ([facility]     ICD-10-CM: E66.01 
ICD-9-CM: 278.01 Tracheostomy present ([facility]     ICD-10-CM: Z93.0 ICD-9-CM: V44.0 Rapid palpitations     ICD-10-CM: R00.2 ICD-9-CM: 785.1 Prediabetes     ICD-10-CM: R73.03 
ICD-9-CM: 790.29 Screening for blood disease     ICD-10-CM: Z13.0 ICD-9-CM: V78.9 Screening cholesterol level     ICD-10-CM: Y46.635 ICD-9-CM: V77.91 Vitals BP Pulse Temp Resp Height(growth percentile) Weight(growth percentile) 126/81 71 98.3 °F (36.8 °C) (Oral) 18 5' 7\" (1.702 m) (!) 381 lb (172.8 kg) BMI OB Status Smoking Status 59.67 kg/m2 Unknown Former Smoker Vitals History BMI and BSA Data Body Mass Index Body Surface Area  
 59.67 kg/m 2 2.86 m 2 Preferred Pharmacy Pharmacy Name Phone [id number] [address] [address] [phone] Your Updated Medication List  
  
   
This list is accurate as of: 1/31/18 10:58 AM.  Always use your most recent med list.  
  
  
  
  
 amoxicillin 500 mg capsule Commonly known as:  AMOXIL  
  
 aspirin delayed-release 81 mg tablet Take 81 mg by mouth daily as needed for Pain. Blood Pressure Test Kit-Large Kit Use to check BP at home, goal 140/90 or less  
  
 metoprolol tartrate 25 mg tablet Commonly known as:  LOPRESSOR Take 1 Tab by mouth two (2) times a day. sertraline 50 mg tablet Commonly known as:  ZOLOFT  
TAKE 1 TAB BY MOUTH DAILY. We Performed the Following CBC WITH AUTOMATED DIFF [07765 CPT(R)] HEMOGLOBIN A1C WITH EAG [64989 CPT(R)] LIPID PANEL [72308 CPT(R)] METABOLIC PANEL, COMPREHENSIVE [43572 CPT(R)] Follow-up Instructions Return in about 3 weeks (around 2/21/2018) for depression, check weight. Patient Instructions Medicare Wellness Visit, Female The best way to live healthy is to have a healthy lifestyle by eating a well-balanced diet, exercising regularly, limiting alcohol and stopping smoking. Regular physical exams and screening tests are another way to keep healthy. Preventive exams provided by your health care provider can find health problems before they become diseases or illnesses. Preventive services including immunizations, screening tests, monitoring and exams can help you take care of your own health. All people over age 72 should have a pneumovax  and and a prevnar shot to prevent pneumonia. These are once in a lifetime unless you and your provider decide differently. All people over 65 should have a yearly flu shot and a tetanus vaccine every 10 years. A bone mass density to screen for osteoporosis or thinning of the bones should be done every 2 years after 65. Screening for diabetes mellitus with a blood sugar test should be done every year. Glaucoma is a disease of the eye due to increased ocular pressure that can lead to blindness and it should be done every year by an eye professional. 
 
Cardiovascular screening tests that check for elevated lipids (fatty part of blood) which can lead to heart disease and strokes should be done every 5 years. Colorectal screening that evaluates for blood or polyps in your colon should be done yearly as a stool test or every five years as a flexible sigmoidoscope or every 10 years as a colonoscopy up to age 76. Breast cancer screening with a mammogram is recommended biennially  for women age 54-69. Screening for cervical cancer with a pap smear and pelvic exam is recommended for women after age 72 years every 2 years up to age 79 or when the provider and patient decide to stop. If there is a history of cervical abnormalities or other increased risk for cancer then the test is recommended yearly. Hepatitis C screening is also recommended for anyone born between 80 through [address]. A shingles vaccine is also recommended once in a lifetime after age 61. Your Medicare Wellness Exam is recommended annually. Here is a list of your current Health Maintenance items with a due date: 
Health Maintenance Due Topic Date Due  Cervical Cancer Screening  06/20/2002 Introducing [facility]! [employer] introduces [employer] patient portal. Now you can access parts of your medical record, email your doctor's office, and request medication refills online. 1. In your internet browser, go to [email or web address] 2. Click on the First Time User? Click Here link in the Sign In box. You will see the New Member Sign Up page. 3. Enter your Zipn[employer] Access Code exactly as it appears below. You will not need to use this code after youve completed the sign-up process. If you do not sign up before the expiration date, you must request a new code. · [employer] Access Code: [email or web address] Expires: 3/5/2018 10:17 AM 
 
4. Enter the last four digits of your Social Security Number (xxxx) and Date of Birth (mm/dd/yyyy) as indicated and click Submit. You will be taken to the next sign-up page. 5. Create a Zipnosis ID. This will be your Zipnosis login ID and cannot be changed, so think of one that is secure and easy to remember. 6. Create a Zipnosis password. You can change your password at any time. 7. Enter your Password Reset Question and Answer. This can be used at a later time if you forget your password. 8. Enter your e-mail address. You will receive e-mail notification when new information is available in [address] 9. Click Sign Up. You can now view and download portions of your medical record.  
10. Click the Download Summary menu link to download a portable copy of your medical information. If you have questions, please visit the Frequently Asked Questions section of the Digitick website. Remember, Digitick is NOT to be used for urgent needs. For medical emergencies, dial 911. Now available from your iPhone and Android! Please provide this summary of care documentation to your next provider. Your primary care clinician is listed as Vinny Langford. If you have any questions after today's visit, please call [phone].

## 2018-01-31 NOTE — PROGRESS NOTES
Chief Complaint   Patient presents with   [facility] Annual Wellness Visit     [facility]     1. Have you been to the ER, urgent care clinic since your last visit? Yes [facility] 01/2018 pt thought she had high bp but bp unremarkable Hospitalized since your last visit? No    2. Have you seen or consulted any other health care providers outside of the [address] [place] [address] since your last visit? Include any pap smears or colon screening.  Yes Dr. Brown [place] dentist    Health Maintenance Due   Topic Date Due    Cervical Cancer Screening  06/20/2002

## 2018-01-31 NOTE — PROGRESS NOTES
This is a Subsequent Medicare Annual Wellness Exam (AWV) (Performed 12 months after IPPE or effective date of Medicare Part B enrollment)    I have reviewed the patient's medical history in detail and updated the computerized patient record. History     Past Medical History:   Diagnosis Date    Depression, major, single episode, severe ([facility] 11/16/2017    Morbid obesity ([facility] 4/18/2016    Racing heart beat     Sleep apnea     Tracheostomy dependence ([facility]     for severe sleep apnea       Past Surgical History:   Procedure Laterality Date    HX GYN  2002    removal of benign ovarian cyst    HX TRACHEOSTOMY  8/26/13    Due to sleep apnea     Current Outpatient Prescriptions   Medication Sig Dispense Refill    amoxicillin (AMOXIL) 500 mg capsule       sertraline (ZOLOFT) 50 mg tablet TAKE 1 TAB BY MOUTH DAILY. 30 Tab 3    metoprolol tartrate (LOPRESSOR) 25 mg tablet Take 1 Tab by mouth two (2) times a day. 180 Tab 0    aspirin delayed-release 81 mg tablet Take 81 mg by mouth daily as needed for Pain.       Blood Pressure Test Kit-Large kit Use to check BP at home, goal 140/90 or less 1 Kit 0     Allergies   Allergen Reactions    Sulfa (Sulfonamide Antibiotics) Rash     Family History   Problem Relation Age of Onset    Diabetes Mother     Hypertension Mother     Heart Disease Maternal Grandmother     No Known Problems Father      Social History   Substance Use Topics    Smoking status: Former Smoker     Packs/day: 2.00     Years: 10.00    Smokeless tobacco: Never Used    Alcohol use No     Patient Active Problem List   Diagnosis Code    Morbid obesity ([facility] E66.01    MATEUSZ on CPAP G47.33, Z99.89    Tracheostomy present ([facility] Z93.0    Former heavy tobacco smoker Z87.891    MEGHAN (generalized anxiety disorder) F41.1    Prediabetes R73.03    Depression, major, single episode, severe ([facility] F32.2       Depression Risk Factor Screening:     PHQ over the last two weeks 11/16/2017   Little interest or pleasure in doing things Not at all   Feeling down, depressed or hopeless Nearly every day   Total Score PHQ 2 3   Trouble falling or staying asleep, or sleeping too much Nearly every day   Feeling tired or having little energy Nearly every day   Poor appetite or overeating Not at all   Feeling bad about yourself - or that you are a failure or have let yourself or your family down Nearly every day   Trouble concentrating on things such as school, work, reading or watching TV Not at all   Moving or speaking so slowly that other people could have noticed; or the opposite being so fidgety that others notice Not at all   Thoughts of being better off dead, or hurting yourself in some way Nearly every day   PHQ 9 Score 15   How difficult have these problems made it for you to do your work, take care of your home and get along with others Extremely difficult     Alcohol Risk Factor Screening: You do not drink alcohol or very rarely. Functional Ability and Level of Safety:   Hearing Loss  Hearing is good. Activities of Daily Living  The home contains: no safety equipment. Patient does total self care   She has no urinary incontinence  She does occasional walking    Fall Risk  Fall Risk Assessment, last 12 mths 11/16/2017   Able to walk? Yes   Fall in past 12 months?  No       Abuse Screen  Patient is not abused    Cognitive Screening   Evaluation of Cognitive Function:  Has your family/caregiver stated any concerns about your memory: no  Normal, Clock Drawing test    Patient Care Team   Patient Care Team:  Mikal Romano MD as PCP - General (Family Practice)  Larry Caraballo MD (Cardiology)  Gamaliel Hale MD (Cardiology)      Visit Vitals    /81    Pulse 71    Temp 98.3 °F (36.8 °C) (Oral)    Resp 18    Ht 5' 7\" (1.702 m)    Wt (!) 381 lb (172.8 kg)    BMI 59.67 kg/m2   General appearance - alert, well appearing, and in no distress and overweight  Mental status - alert, oriented to person, place, and time, normal mood, behavior, speech, dress, motor activity, and thought processes  Neck - trach present   Chest - clear to auscultation, no wheezes, rales or rhonchi, symmetric air entry   Heart - normal rate, regular rhythm, normal S1, S2, no murmurs, rubs, clicks or gallops   Ext-no pedal edema noted    Assessment/Plan   Education and counseling provided:  Are appropriate based on today's review and evaluation   Patient plans to complete and advanced directive and bring it in  [address] was discussed but the patient did not wish or was not able to name a surrogate decision maker or provide an Advance Care Plan     Diagnoses and all orders for this visit:    1. Medicare annual wellness visit, subsequent  -     AR FUNCTIONAL STATUS ASSESSED  -     AR LEVEL OF ACTIVITY ASSESSED  -     AR PRESENCE/ABSENCE URINARY INCONTINENCE ASSESSED  -     BEHAV ASSMT W/SCORE & DOCD/STAND INSTRUMENT  -     AR PT FALLS ASSESS DOC 0-1 FALLS W/OUT INJ PAST YR  -     AR PAIN SEVERITY QUANTIFIED, PAIN PRESENT    2. Palpitations    3. Depression, major, single episode, severe ([address]    4. Morbid obesity ([address]    5. Tracheostomy present ([address]    6. Prediabetes  -     METABOLIC PANEL, COMPREHENSIVE  -     HEMOGLOBIN A1C WITH EAG    7. Screening for blood disease  -     CBC WITH AUTOMATED DIFF    8. Screening cholesterol level  -     LIPID PANEL        Health Maintenance Due   Topic Date Due    PAP AKA CERVICAL CYTOLOGY  06/20/2002       Denied SI in follow up phone call  She is seeing Cardiology for palpitations and has follow up scheduled  Will refer to gyn for PAP  I have reviewed/discussed the above normal BMI with the patient. I have recommended the following interventions: dietary management education, guidance, and counseling, encourage exercise and prescribed dietary intake . Melina Hedrick Follow-up Disposition:  Return in about 3 weeks (around 2/21/2018) for depression, check weight. Reviewed plan of care.   Patient has provided input and agrees with goals.

## 2018-02-01 ENCOUNTER — TELEPHONE (OUTPATIENT)
Dept: FAMILY MEDICINE CLINIC | Age: 38
End: 2018-02-01

## 2018-02-01 DIAGNOSIS — Z12.4 SCREENING FOR CERVICAL CANCER: Primary | ICD-10-CM

## 2018-02-01 NOTE — TELEPHONE ENCOUNTER
Alvin Montemayor called from [facility]'s transitional team requesting pt's office notes from yesterday be faxed to [phone]. Notes to be faxed after Dr. Mau Dove signs off on notes.  Ldm

## 2018-02-07 ENCOUNTER — TELEPHONE (OUTPATIENT)
Dept: FAMILY MEDICINE CLINIC | Age: 38
End: 2018-02-07

## 2018-02-07 NOTE — TELEPHONE ENCOUNTER
Chart closed. Please forward it.   Also, the patient needs a gyn exam.  I have printed a Gynecology referral request.

## 2018-02-07 NOTE — TELEPHONE ENCOUNTER
Follow up phone call from visit last week. Evidently, she had told the nurse that she felt she was better off dead or hurting herself in some was. She denies this and said she had not been suicidal for several years.

## 2018-02-10 RX ORDER — METOPROLOL TARTRATE 25 MG/1
TABLET, FILM COATED ORAL
Qty: 180 TAB | Refills: 3 | Status: SHIPPED | OUTPATIENT
Start: 2018-02-10 | End: 2019-02-10 | Stop reason: SDUPTHER

## 2018-02-19 ENCOUNTER — TELEPHONE (OUTPATIENT)
Dept: FAMILY MEDICINE CLINIC | Age: 38
End: 2018-02-19

## 2018-02-19 NOTE — TELEPHONE ENCOUNTER
Called pt, and left a voice message, that I was returning her call. Advised pt to call the office back with question or concern.

## 2018-02-19 NOTE — TELEPHONE ENCOUNTER
----- Message from Jimmie Ganser sent at 2/16/2018 11:44 AM EST -----  Regarding: Dr. Mark Lloyd Telephone  Patient would like a call back regarding getting referrals for her appts.  Contact is [phone]

## 2018-03-08 LAB
ALBUMIN SERPL-MCNC: 4.1 G/DL (ref 3.5–5.5)
ALBUMIN/GLOB SERPL: 1.1 {RATIO} (ref 1.2–2.2)
ALP SERPL-CCNC: 68 IU/L (ref 39–117)
ALT SERPL-CCNC: 22 IU/L (ref 0–32)
AST SERPL-CCNC: 21 IU/L (ref 0–40)
BASOPHILS # BLD AUTO: 0 X10E3/UL (ref 0–0.2)
BASOPHILS NFR BLD AUTO: 0 %
BILIRUB SERPL-MCNC: 0.3 MG/DL (ref 0–1.2)
BUN SERPL-MCNC: 10 MG/DL (ref 6–20)
BUN/CREAT SERPL: 15 (ref 9–23)
CALCIUM SERPL-MCNC: 9.2 MG/DL (ref 8.7–10.2)
CHLORIDE SERPL-SCNC: 102 MMOL/L (ref 96–106)
CHOLEST SERPL-MCNC: 151 MG/DL (ref 100–199)
CO2 SERPL-SCNC: 26 MMOL/L (ref 18–29)
CREAT SERPL-MCNC: 0.66 MG/DL (ref 0.57–1)
EOSINOPHIL # BLD AUTO: 0.1 X10E3/UL (ref 0–0.4)
EOSINOPHIL NFR BLD AUTO: 2 %
ERYTHROCYTE [DISTWIDTH] IN BLOOD BY AUTOMATED COUNT: 15.5 % (ref 12.3–15.4)
EST. AVERAGE GLUCOSE BLD GHB EST-MCNC: 123 MG/DL
GFR SERPLBLD CREATININE-BSD FMLA CKD-EPI: 114 ML/MIN/1.73
GFR SERPLBLD CREATININE-BSD FMLA CKD-EPI: 131 ML/MIN/1.73
GLOBULIN SER CALC-MCNC: 3.7 G/DL (ref 1.5–4.5)
GLUCOSE SERPL-MCNC: 92 MG/DL (ref 65–99)
HBA1C MFR BLD: 5.9 % (ref 4.8–5.6)
HCT VFR BLD AUTO: 37.2 % (ref 34–46.6)
HDLC SERPL-MCNC: 53 MG/DL
HGB BLD-MCNC: 11.5 G/DL (ref 11.1–15.9)
IMM GRANULOCYTES # BLD: 0 X10E3/UL (ref 0–0.1)
IMM GRANULOCYTES NFR BLD: 0 %
INTERPRETATION, 910389: NORMAL
LDLC SERPL CALC-MCNC: 87 MG/DL (ref 0–99)
LYMPHOCYTES # BLD AUTO: 1.8 X10E3/UL (ref 0.7–3.1)
LYMPHOCYTES NFR BLD AUTO: 27 %
MCH RBC QN AUTO: 26.9 PG (ref 26.6–33)
MCHC RBC AUTO-ENTMCNC: 30.9 G/DL (ref 31.5–35.7)
MCV RBC AUTO: 87 FL (ref 79–97)
MONOCYTES # BLD AUTO: 0.5 X10E3/UL (ref 0.1–0.9)
MONOCYTES NFR BLD AUTO: 7 %
NEUTROPHILS # BLD AUTO: 4.4 X10E3/UL (ref 1.4–7)
NEUTROPHILS NFR BLD AUTO: 64 %
PLATELET # BLD AUTO: 398 X10E3/UL (ref 150–379)
POTASSIUM SERPL-SCNC: 5 MMOL/L (ref 3.5–5.2)
PROT SERPL-MCNC: 7.8 G/DL (ref 6–8.5)
RBC # BLD AUTO: 4.28 X10E6/UL (ref 3.77–5.28)
SODIUM SERPL-SCNC: 141 MMOL/L (ref 134–144)
TRIGL SERPL-MCNC: 55 MG/DL (ref 0–149)
VLDLC SERPL CALC-MCNC: 11 MG/DL (ref 5–40)
WBC # BLD AUTO: 6.9 X10E3/UL (ref 3.4–10.8)

## 2018-05-29 ENCOUNTER — OFFICE VISIT (OUTPATIENT)
Dept: CARDIOLOGY CLINIC | Age: 38
End: 2018-05-29

## 2018-06-28 ENCOUNTER — TELEPHONE (OUTPATIENT)
Dept: FAMILY MEDICINE CLINIC | Age: 38
End: 2018-06-28

## 2018-06-28 DIAGNOSIS — F32.2 DEPRESSION, MAJOR, SINGLE EPISODE, SEVERE (HCC): Primary | ICD-10-CM

## 2018-06-28 DIAGNOSIS — F41.1 GAD (GENERALIZED ANXIETY DISORDER): ICD-10-CM

## 2018-06-28 NOTE — TELEPHONE ENCOUNTER
Pt called requesting [facility] insurance referral to [employer] for [address] at [address] [facility] [address].   Jasonm

## 2018-06-29 NOTE — TELEPHONE ENCOUNTER
Pt does not have name of specific doctor, but wants referral done under practice name, stating it is out of network but if she gets referral from Dr. Juan Alberto Boykin she will pay $25 copay.  Danyell

## 2018-07-02 NOTE — TELEPHONE ENCOUNTER
I need to know if she will be seeing a psychologist vs a LCSW. It needs to be put in [facility] under these options.   She should schedule an appointment first

## 2018-07-03 NOTE — TELEPHONE ENCOUNTER
Please contact them and see if she would be seeing a LCSW vs a psychologist.  I need to put it in one category or another to do the referral request in [facility]. There is a card on my desk with their number on it.

## 2018-07-03 NOTE — TELEPHONE ENCOUNTER
Pt called to check on status of referral and stated that she will be seeing a psychologist at [facility] and cannot make an appointment without a referral from Dr Maegan Kenyon.

## 2018-07-05 NOTE — TELEPHONE ENCOUNTER
Called [facility], spoke with Destinee Gutierrez and was advised they are non par with all Medicare insurance plans and pt would be billed $115 for initial visit and $80 per subsequent visit, but was unable to verify pt would have $25 copay since she would be paying out of pocket for visit.      Pt advised, states she was not aware of this and will call insurance to ask for participating provider and call us back for new referral. Jairo Merino

## 2018-07-10 ENCOUNTER — OFFICE VISIT (OUTPATIENT)
Dept: FAMILY MEDICINE CLINIC | Age: 38
End: 2018-07-10

## 2018-07-10 VITALS
DIASTOLIC BLOOD PRESSURE: 84 MMHG | RESPIRATION RATE: 22 BRPM | BODY MASS INDEX: 45.99 KG/M2 | WEIGHT: 293 LBS | SYSTOLIC BLOOD PRESSURE: 125 MMHG | HEIGHT: 67 IN | TEMPERATURE: 98.4 F | HEART RATE: 86 BPM

## 2018-07-10 DIAGNOSIS — Z11.1 SCREENING FOR TUBERCULOSIS: ICD-10-CM

## 2018-07-10 DIAGNOSIS — D69.6 THROMBOCYTOPENIA (HCC): ICD-10-CM

## 2018-07-10 DIAGNOSIS — G47.33 OSA ON CPAP: ICD-10-CM

## 2018-07-10 DIAGNOSIS — Z99.89 OSA ON CPAP: ICD-10-CM

## 2018-07-10 DIAGNOSIS — F32.2 DEPRESSION, MAJOR, SINGLE EPISODE, SEVERE (HCC): ICD-10-CM

## 2018-07-10 DIAGNOSIS — E66.01 MORBID OBESITY (HCC): Primary | ICD-10-CM

## 2018-07-10 DIAGNOSIS — R73.01 IFG (IMPAIRED FASTING GLUCOSE): ICD-10-CM

## 2018-07-10 DIAGNOSIS — F41.1 GAD (GENERALIZED ANXIETY DISORDER): ICD-10-CM

## 2018-07-10 NOTE — ASSESSMENT & PLAN NOTE
Currently non-compliant, encouraged patient on restarting cpap, recommend follow up with pulmonary if questions

## 2018-07-10 NOTE — PROGRESS NOTES
Chief Complaint   Patient presents with    Depression     follow up     Weight Management     follow up     Immunization/Injection     TB

## 2018-07-10 NOTE — ASSESSMENT & PLAN NOTE
Resolving, patient off medication now, feels stable, looking for a therapist through her insurance. Might benefit from cognitive behavioral therapy as there are sometimes aspects of anxiety that are comorbid.

## 2018-07-10 NOTE — ASSESSMENT & PLAN NOTE
Uncontrolled, based on history, physical exam and review of pertinent labs, studies and medications; meds reconciled; changes to treatment plan as per orders, lifestyle modifications recommended. Key Obesity Meds     Patient is on no anti-obesity meds. Lab Results   Component Value Date/Time    Hemoglobin A1c 5.9 03/07/2018 12:43 PM    Glucose 92 03/07/2018 12:43 PM    Cholesterol, total 151 03/07/2018 12:43 PM    HDL Cholesterol 53 03/07/2018 12:43 PM    LDL, calculated 87 03/07/2018 12:43 PM    Triglyceride 55 03/07/2018 12:43 PM    Sodium 141 03/07/2018 12:43 PM    Potassium 5.0 03/07/2018 12:43 PM    ALT (SGPT) 22 03/07/2018 12:43 PM    AST (SGOT) 21 03/07/2018 12:43 PM       Patient is planning to trial a Keto diet. Has accountability partner (mom). Recommend might benefit from meal planning, tracking foods and exercise (my fitness pal). Might benefit from meeting with obesity medicine or bariatric surgery.

## 2018-07-10 NOTE — PATIENT INSTRUCTIONS
Depression, major, single episode, severe [facility]  Resolving, patient off medication now, feels stable, looking for a therapist through her insurance. Might benefit from cognitive behavioral therapy as there are sometimes aspects of anxiety that are comorbid. MEGHAN (generalized anxiety disorder)  Encouraged patient to trial cognitive behavioral therapy    Morbid obesity ([facility]  Uncontrolled, based on history, physical exam and review of pertinent labs, studies and medications; meds reconciled; changes to treatment plan as per orders, lifestyle modifications recommended. Key Obesity Meds     Patient is on no anti-obesity meds. Lab Results   Component Value Date/Time    Hemoglobin A1c 5.9 03/07/2018 12:43 PM    Glucose 92 03/07/2018 12:43 PM    Cholesterol, total 151 03/07/2018 12:43 PM    HDL Cholesterol 53 03/07/2018 12:43 PM    LDL, calculated 87 03/07/2018 12:43 PM    Triglyceride 55 03/07/2018 12:43 PM    Sodium 141 03/07/2018 12:43 PM    Potassium 5.0 03/07/2018 12:43 PM    ALT (SGPT) 22 03/07/2018 12:43 PM    AST (SGOT) 21 03/07/2018 12:43 PM       Patient is planning to trial a Keto diet. Has accountability partner (mom). Recommend might benefit from meal planning, tracking foods and exercise (my fitness pal). Might benefit from meeting with obesity medicine or bariatric surgery.     MATEUSZ on CPAP  Currently non-compliant, encouraged patient on restarting cpap, recommend follow up with pulmonary if questions    Thrombocytopenia ([facility]  likely d/t obesity and sleep apnea, will recheck and continue to monitor  Lab Results   Component Value Date/Time    WBC 6.9 03/07/2018 12:43 PM    HGB 11.5 03/07/2018 12:43 PM    HCT 37.2 03/07/2018 12:43 PM    PLATELET 447 18/74/6256 12:43 PM    Creatinine 0.66 03/07/2018 12:43 PM    BUN 10 03/07/2018 12:43 PM    Potassium 5.0 03/07/2018 12:43 PM       IFG (impaired fasting glucose)  Due for a1c, will check, encouraged on weight loss and exercise

## 2018-07-10 NOTE — ASSESSMENT & PLAN NOTE
likely d/t obesity and sleep apnea, will recheck and continue to monitor  Lab Results   Component Value Date/Time    WBC 6.9 03/07/2018 12:43 PM    HGB 11.5 03/07/2018 12:43 PM    HCT 37.2 03/07/2018 12:43 PM    PLATELET 209 85/82/1446 12:43 PM    Creatinine 0.66 03/07/2018 12:43 PM    BUN 10 03/07/2018 12:43 PM    Potassium 5.0 03/07/2018 12:43 PM

## 2018-07-10 NOTE — MR AVS SNAPSHOT
[address] 
[phone] Patient: Faye Hawkins MRN: [id number] WWR:[birth year] Visit Information Date & Time Provider Department Dept. Phone Encounter #  
 7/10/2018  2:00 PM Tania Dennis [address] [id number] Your Appointments 7/13/2018  9:15 AM  
Nurse Visit with Tania Dennis MD  
[address] [place] [address]) Appt Note: nurse visit for PPD reading [address] [facility] [address]  
[phone]  
  
   
 [address] [facility] [address]  
  
    
 7/13/2018 11:40 AM  
ESTABLISHED PATIENT with Javan Monterroso MD  
[facility] ([facility]) Appt Note: fu appt Dx Palps   sll; fu appt Dx [facility] sll r.s from 1/10; 2/13/18 spoke to pt of appt time and day change from 3/7/18  sll; 3/28/18 pt called and r/s appt from 3/28/18 sll; 3/28/18 pt c/b and r/s appt wanted a morning appt sll; 5/23/18 pt called and r/s appt from 5/23/18 sll [address] [facility] [address] [facility] [address]  
[phone]  
  
   
 [address] [facility] [address] [place] [id number]  
  
    
 7/20/2018  3:00 PM  
ESTABLISHED PATIENT with Alka Ugarte MD  
[facility]) Appt Note: F/U COMPLAINING OF CHEST PAIN  
 [address] [facility] [address] [place] [address]  
[phone] [address] Upcoming Health Maintenance Date Due  
 PAP AKA CERVICAL CYTOLOGY 6/20/2002 Influenza Age 5 to Adult 8/1/2018 MEDICARE YEARLY EXAM 2/1/2019 DTaP/Tdap/Td series (2 - Td) 10/6/2026 Allergies as of 7/10/2018  Review Complete On: 7/10/2018 By: Tania Dennis MD  
  
 Severity Noted Reaction Type Reactions Sulfa (Sulfonamide Antibiotics)  04/03/2016    Rash Current Immunizations  Reviewed on 10/6/2016 Name Date  
 TB Skin Test (PPD) Intradermal 7/10/2018 Not reviewed this visit You Were Diagnosed With   
  
 Codes Comments Morbid obesity ([facility]    -  Primary ICD-10-CM: E66.01 
ICD-9-CM: 278.01 Depression, major, single episode, severe ([facility]     ICD-10-CM: F32.2 ICD-9-CM: 296.23   
 MEGHAN (generalized anxiety disorder)     ICD-10-CM: F41.1 ICD-9-CM: 300.02   
 MATEUSZ on CPAP     ICD-10-CM: G47.33, Z99.89 ICD-9-CM: 327.23, V46.8 Thrombocytopenia ([facility]     ICD-10-CM: D69.6 ICD-9-CM: 287.5 IFG (impaired fasting glucose)     ICD-10-CM: R73.01 
ICD-9-CM: 790.21 Screening for tuberculosis     ICD-10-CM: Z11.1 ICD-9-CM: V74.1 Vitals BP Pulse Temp Resp Height(growth percentile) Weight(growth percentile) 125/84 86 98.4 °F (36.9 °C) (Oral) 22 5' 7\" (1.702 m) (!) 421 lb (191 kg) BMI OB Status Smoking Status 65.94 kg/m2 Unknown Former Smoker Vitals History BMI and BSA Data Body Mass Index Body Surface Area 65.94 kg/m 2 3 m 2 Preferred Pharmacy Pharmacy Name Phone [id number] [phone] Your Updated Medication List  
  
   
This list is accurate as of 7/10/18  3:31 PM.  Always use your most recent med list.  
  
  
  
  
 amoxicillin 500 mg capsule Commonly known as:  AMOXIL  
  
 aspirin delayed-release 81 mg tablet Take 81 mg by mouth daily as needed for Pain. Blood Pressure Test Kit-Large Kit Use to check BP at home, goal 140/90 or less  
  
 metoprolol tartrate 25 mg tablet Commonly known as:  LOPRESSOR  
TAKE 1 TABLET BY MOUTH TWICE A DAY We Performed the Following AMB POC TUBERCULOSIS, INTRADERMAL (SKIN TEST) [32994 CPT(R)] CBC W/O DIFF [89482 CPT(R)] HEMOGLOBIN A1C WITH EAG [50817 CPT(R)] REFERRAL TO BARIATRIC SURGERY [OXR533 Custom] REFERRAL TO INTERNAL MEDICINE [REF40 Custom] Comments:  
 Re: medical weight loss/obesity med consult TSH RFX ON ABNORMAL TO FREE T4 [[id number] Custom] Referral Information Referral ID Referred By Referred To  
  
 [id number] Dennise KING MD   
   [facility] [address] Phone: [phone] Fax: [phone] Visits Status Start Date End Date 1 New Request 7/10/18 7/10/19 If your referral has a status of pending review or denied, additional information will be sent to support the outcome of this decision. Referral ID Referred By Referred To  
 [id number] Porfirio KING MD  
   [address] [place] [address] Phone: [phone] Fax: [phone] Visits Status Start Date End Date 1 New Request 7/10/18 7/10/19 If your referral has a status of pending review or denied, additional information will be sent to support the outcome of this decision. Patient Instructions Depression, major, single episode, severe ([facility] Resolving, patient off medication now, feels stable, looking for a therapist through her insurance. Might benefit from cognitive behavioral therapy as there are sometimes aspects of anxiety that are comorbid. MEGHAN (generalized anxiety disorder) Encouraged patient to trial cognitive behavioral therapy Morbid obesity ([facility] Uncontrolled, based on history, physical exam and review of pertinent labs, studies and medications; meds reconciled; changes to treatment plan as per orders, lifestyle modifications recommended. Key Obesity Meds Patient is on no anti-obesity meds. Lab Results Component Value Date/Time  Hemoglobin A1c 5.9 03/07/2018 12:43 PM  
 Glucose 92 03/07/2018 12:43 PM  
 Cholesterol, total 151 03/07/2018 12:43 PM  
 HDL Cholesterol 53 03/07/2018 12:43 PM  
 LDL, calculated 87 03/07/2018 12:43 PM  
 Triglyceride 55 03/07/2018 12:43 PM  
 Sodium 141 03/07/2018 12:43 PM  
 Potassium 5.0 03/07/2018 12:43 PM  
 ALT (SGPT) 22 03/07/2018 12:43 PM  
 AST (SGOT) 21 03/07/2018 12:43 PM  
 
 
Patient is planning to trial a Keto diet. Has accountability partner (mom). Recommend might benefit from meal planning, tracking foods and exercise (my fitness pal). Might benefit from meeting with obesity medicine or bariatric surgery. MATEUSZ on CPAP Currently non-compliant, encouraged patient on restarting cpap, recommend follow up with pulmonary if questions Thrombocytopenia ([facility] likely d/t obesity and sleep apnea, will recheck and continue to monitor Lab Results Component Value Date/Time WBC 6.9 03/07/2018 12:43 PM  
 HGB 11.5 03/07/2018 12:43 PM  
 HCT 37.2 03/07/2018 12:43 PM  
 PLATELET 904 99/40/7863 12:43 PM  
 Creatinine 0.66 03/07/2018 12:43 PM  
 BUN 10 03/07/2018 12:43 PM  
 Potassium 5.0 03/07/2018 12:43 PM  
 
 
IFG (impaired fasting glucose) Due for a1c, will check, encouraged on weight loss and exercise Introducing [facility]! [place] introduces [employer] patient portal. Now you can access parts of your medical record, email your doctor's office, and request medication refills online. 1. In your internet browser, go to [email or web address] 2. Click on the First Time User? Click Here link in the Sign In box. You will see the New Member Sign Up page. 3. Enter your [employer] Access Code exactly as it appears below. You will not need to use this code after youve completed the sign-up process. If you do not sign up before the expiration date, you must request a new code. · [employer] Access Code: [email or web address] Expires: 10/8/2018  2:40 PM 
 
4. Enter the last four digits of your Social Security Number (xxxx) and Date of Birth (mm/dd/yyyy) as indicated and click Submit. You will be taken to the next sign-up page. 5. Create a [employer] ID. This will be your [employer] login ID and cannot be changed, so think of one that is secure and easy to remember. 6. Create a [employer] password. You can change your password at any time. 7. Enter your Password Reset Question and Answer. This can be used at a later time if you forget your password. 8. Enter your e-mail address. You will receive e-mail notification when new information is available in [address] 9. Click Sign Up. You can now view and download portions of your medical record. 10. Click the Download Summary menu link to download a portable copy of your medical information. If you have questions, please visit the Frequently Asked Questions section of the [employer] website. Remember, [employer] is NOT to be used for urgent needs. For medical emergencies, dial 911. Now available from your iPhone and Android! Please provide this summary of care documentation to your next provider. Your primary care clinician is listed as Janki Del Toro. If you have any questions after today's visit, please call [phone].

## 2018-07-13 ENCOUNTER — OFFICE VISIT (OUTPATIENT)
Dept: CARDIOLOGY CLINIC | Age: 38
End: 2018-07-13

## 2018-07-13 ENCOUNTER — CLINICAL SUPPORT (OUTPATIENT)
Dept: FAMILY MEDICINE CLINIC | Age: 38
End: 2018-07-13

## 2018-07-13 VITALS
RESPIRATION RATE: 20 BRPM | HEART RATE: 88 BPM | HEIGHT: 67 IN | WEIGHT: 293 LBS | OXYGEN SATURATION: 94 % | BODY MASS INDEX: 45.99 KG/M2 | SYSTOLIC BLOOD PRESSURE: 124 MMHG | DIASTOLIC BLOOD PRESSURE: 72 MMHG

## 2018-07-13 DIAGNOSIS — Z11.1 ENCOUNTER FOR PPD SKIN TEST READING: Primary | ICD-10-CM

## 2018-07-13 DIAGNOSIS — R00.2 PALPITATIONS: Primary | ICD-10-CM

## 2018-07-13 LAB
MM INDURATION POC: 0 MM (ref 0–5)
PPD POC: NEGATIVE NEGATIVE

## 2018-07-13 RX ORDER — ASPIRIN 325 MG
325 TABLET ORAL DAILY
COMMUNITY
End: 2020-06-08

## 2018-07-13 NOTE — PROGRESS NOTES
Visit Vitals    /72 (BP 1 Location: Left arm, BP Patient Position: Sitting)    Pulse 88    Resp 20    Ht 5' 7\" (1.702 m)    Wt (!) 419 lb 8 oz (190.3 kg)    SpO2 94%    BMI 65.7 kg/m2     Medication changes made  per verbal order of Dr. Stephanie Carballo

## 2018-07-13 NOTE — PROGRESS NOTES
HISTORY OF PRESENTING ILLNESS      Tessy Shipley is a 41 y.o. female with MATEUSZ, tracheostomy, obesity who has experienced recurrent highly symptomatic palpitations which had been challenging to document despite monitoring and presentation to ER. She underwent 30 day monitor which demonstrated sinus rhythm as well as long RP tachycardias with rates in the 150's. She reported numerous episodes of chest pain/pressure/palpitations during which her heart rate was normal. She also reported palpitations during episodes of long RP tachycardias. She reported feeling poorly with metoprolol 25 mg BID in that she felt chest pain and felt that her heart rate was too slow. Her dose was lowered to 12.5 mg BID subsequently. She noted her palpitations awakened her from sleep occasionally however overall her palpitation burden was improving and that her chest pain and pressure has resolved. She continues to have occasional episodes of tachycardia that will last up to 5 minutes. She has approximately 2 episodes per month.        ACTIVE PROBLEM LIST     Patient Active Problem List    Diagnosis Date Noted    Thrombocytopenia ([address] 07/10/2018    IFG (impaired fasting glucose) 07/10/2018    Depression, major, single episode, severe ([address] 11/16/2017    Former heavy tobacco smoker 08/13/2016    MEGHAN (generalized anxiety disorder) 08/13/2016    Prediabetes 08/13/2016    Morbid obesity ([address] 04/18/2016    MATEUSZ on CPAP 04/18/2016    Tracheostomy present ([address] 04/18/2016           PAST MEDICAL HISTORY     Past Medical History:   Diagnosis Date    Depression, major, single episode, severe ([address] 11/16/2017    Morbid obesity ([address] 4/18/2016    Racing heart beat     Sleep apnea     Tracheostomy dependence ([address]     for severe sleep apnea            PAST SURGICAL HISTORY     Past Surgical History:   Procedure Laterality Date    HX GYN  2002    removal of benign ovarian cyst    HX TRACHEOSTOMY  8/26/13    Due to sleep apnea          ALLERGIES     Allergies   Allergen Reactions    Sulfa (Sulfonamide Antibiotics) Rash          FAMILY HISTORY     Family History   Problem Relation Age of Onset    Diabetes Mother     Hypertension Mother     Heart Disease Maternal Grandmother     No Known Problems Father     negative for cardiac disease       SOCIAL HISTORY     Social History     Social History    Marital status: SINGLE     Spouse name: N/A    Number of children: N/A    Years of education: N/A     Social History Main Topics    Smoking status: Former Smoker     Packs/day: 2.00     Years: 10.00    Smokeless tobacco: Never Used    Alcohol use No    Drug use: No      Comment: Smoked Marijuana in [id number], but not current user. -vr    Sexual activity: No     Other Topics Concern    Not on file     Social History Narrative         MEDICATIONS     Current Outpatient Prescriptions   Medication Sig    metoprolol tartrate (LOPRESSOR) 25 mg tablet TAKE 1 TABLET BY MOUTH TWICE A DAY    amoxicillin (AMOXIL) 500 mg capsule     Blood Pressure Test Kit-Large kit Use to check BP at home, goal 140/90 or less    aspirin delayed-release 81 mg tablet Take 81 mg by mouth daily as needed for Pain. No current facility-administered medications for this visit. I have reviewed the nurses notes, vitals, problem list, allergy list, medical history, family, social history and medications. REVIEW OF SYMPTOMS      General: Pt denies excessive weight gain or loss. Pt is able to conduct ADL's  HEENT: Denies blurred vision, headaches, hearing loss, epistaxis and difficulty swallowing. Respiratory: Denies cough, congestion, shortness of breath, PIZANO, wheezing or stridor.   Cardiovascular: Denies precordial pain, palpitations, edema or PND  Gastrointestinal: Denies poor appetite, indigestion, abdominal pain or blood in stool  Genitourinary: Denies hematuria, dysuria, increased urinary frequency  Musculoskeletal: Denies joint pain or swelling from muscles or joints  Neurologic: Denies tremor, paresthesias, headache, or sensory motor disturbance  Psychiatric: Denies confusion, insomnia, depression  Integumentray: Denies rash, itching or ulcers. Hematologic: Denies easy bruising, bleeding       PHYSICAL EXAMINATION      There were no vitals filed for this visit. General: Well developed, in no acute distress. HEENT: No jaundice, oral mucosa moist, no oral ulcers  Neck: Supple, no stiffness, no lymphadenopathy, supple  Heart:  Normal S1/S2 negative S3 or S4. Regular, no murmur, gallop or rub, no jugular venous distention  Respiratory: Clear bilaterally x 4, no wheezing or rales  Abdomen:   Soft, non-tender, bowel sounds are active.   Extremities:  No edema, normal cap refill, no cyanosis. Musculoskeletal: No clubbing, no deformities  Neuro: A&Ox3, speech clear, gait stable, cooperative, no focal neurologic deficits  Skin: Skin color is normal. No rashes or lesions.  Non diaphoretic, moist.  Vascular: 2+ pulses symmetric in all extremities       DIAGNOSTIC DATA      EKG: Sinus rhythm       LABORATORY DATA      Lab Results   Component Value Date/Time    WBC 6.9 03/07/2018 12:43 PM    Hemoglobin (POC) 12.6 04/03/2016 06:46 AM    HGB 11.5 03/07/2018 12:43 PM    Hematocrit (POC) 37 04/03/2016 06:46 AM    HCT 37.2 03/07/2018 12:43 PM    PLATELET 482 (H) 71/82/1759 12:43 PM    MCV 87 03/07/2018 12:43 PM      Lab Results   Component Value Date/Time    Sodium 141 03/07/2018 12:43 PM    Potassium 5.0 03/07/2018 12:43 PM    Chloride 102 03/07/2018 12:43 PM    CO2 26 03/07/2018 12:43 PM    Anion gap 8 07/30/2016 05:45 PM    Glucose 92 03/07/2018 12:43 PM    BUN 10 03/07/2018 12:43 PM    Creatinine 0.66 03/07/2018 12:43 PM    BUN/Creatinine ratio 15 03/07/2018 12:43 PM    GFR est  03/07/2018 12:43 PM    GFR est non- 03/07/2018 12:43 PM    Calcium 9.2 03/07/2018 12:43 PM    Bilirubin, total 0.3 03/07/2018 12:43 PM    AST (SGOT) 21 03/07/2018 12:43 PM    Alk. phosphatase 68 03/07/2018 12:43 PM    Protein, total 7.8 03/07/2018 12:43 PM    Albumin 4.1 03/07/2018 12:43 PM    Globulin 4.6 (H) 07/30/2016 05:45 PM    A-G Ratio 1.1 (L) 03/07/2018 12:43 PM    ALT (SGPT) 22 03/07/2018 12:43 PM           ASSESSMENT      1. Palpitations   2. Obstructive sleep apnea  3. Tracheostomy    4. Obesity       PLAN     Discussed options of adding diltiazem 60 mg twice daily to her current regimen however she wishes to avoid additional medications. She will continue her current dose of metoprolol and take additional doses of metoprolol as needed for her occasional episodes of tachycardia. FOLLOW-UP     6 months with Magnolia Garner        Thank you, Star Koo MD and Dr. Lavonne Elias for allowing me to participate in the care of this extraordinarily pleasant female. Please do not hesitate to contact me for further questions/concerns.          Tr Katz MD  Cardiac Electrophysiology / Cardiology    [place] [address].  [address] [place] [address]  [phone] / [phone] Fax   [phone] / [phone] Fax

## 2018-07-13 NOTE — MR AVS SNAPSHOT
16[address] [facility] 600 1007 [facility] 
[phone] Patient: Cassi Samuel MRN: [id number] NMY:[birth year] Visit Information Date & Time Provider Department Dept. Phone Encounter #  
 7/13/2018 11:40 AM Katie Davis MD [facility]ra Hoskins [phone] [id number] Your Appointments 7/13/2018  1:00 PM  
Nurse Visit with Jyoti Ocampo MD  
[address] [facility]) Appt Note: nurse visit for PPD Reading [facility] 1007 [facility]  
[phone]  
  
   
 [address] [facility].  [address] [address]  
  
    
 7/20/2018  3:00 PM  
ESTABLISHED PATIENT with Rodrigo Art MD  
[facility]) Appt Note: F/U COMPLAINING OF CHEST PAIN  
 26[address] [facility] 110 [address] [place], [place] [id number] [address]  
  
    
 1/16/2019  1:20 PM  
ESTABLISHED PATIENT with Gadiel Sharma NP  
[facility] ([facility]) Appt Note: 6 month follow up.  
 320 [facility] 600 1007 [facility]  
[address] [facility] [address] [address] Upcoming Health Maintenance Date Due  
 PAP AKA CERVICAL CYTOLOGY 6/20/2002 Influenza Age 5 to Adult 8/1/2018 MEDICARE YEARLY EXAM 2/1/2019 DTaP/Tdap/Td series (2 - Td) 10/6/2026 Allergies as of 7/13/2018  Review Complete On: 7/13/2018 By: Sarah Rangel LPN Severity Noted Reaction Type Reactions Sulfa (Sulfonamide Antibiotics)  04/03/2016    Rash Current Immunizations  Reviewed on 10/6/2016 Name Date  
 TB Skin Test (PPD) Intradermal 7/10/2018 Not reviewed this visit You Were Diagnosed With   
  
 Codes Comments Palpitations    -  Primary ICD-10-CM: R00.2 ICD-9-CM: 785.1 Vitals BP Pulse Resp Height(growth percentile) Weight(growth percentile) SpO2  
 124/72 (BP 1 Location: Left arm, BP Patient Position: Sitting) 88 20 5' 7\" (1.702 m) (!) 419 lb 8 oz (190.3 kg) 94% BMI OB Status Smoking Status 65.7 kg/m2 Unknown Former Smoker Vitals History BMI and BSA Data Body Mass Index Body Surface Area 65.7 kg/m 2 3 m 2 Preferred Pharmacy Pharmacy Name Phone [id number]- [place] - [address] [phone] Your Updated Medication List  
  
   
This list is accurate as of 7/13/18 12:12 PM.  Always use your most recent med list.  
  
  
  
  
 aspirin 325 mg tablet Commonly known as:  ASPIRIN Take 325 mg by mouth daily. Blood Pressure Test Kit-Large Kit Use to check BP at home, goal 140/90 or less FISH OIL PO Take 1 Cap by mouth daily. metoprolol tartrate 25 mg tablet Commonly known as:  LOPRESSOR  
TAKE 1 TABLET BY MOUTH TWICE A DAY  
  
 ONE-A-DAY WOMENS FORMULA PO Take 1 Tab by mouth daily. We Performed the Following AMB POC EKG ROUTINE W/ 12 LEADS, INTER & REP [86292 CPT(R)] Introducing [facility]! Katalina Haywood introduces [employer] patient portal. Now you can access parts of your medical record, email your doctor's office, and request medication refills online. 1. In your internet browser, go to [email or web address] 2. Click on the First Time User? Click Here link in the Sign In box. You will see the New Member Sign Up page. 3. Enter your [employer] Access Code exactly as it appears below. You will not need to use this code after youve completed the sign-up process. If you do not sign up before the expiration date, you must request a new code. · [employer] Access Code: [email or web address] Expires: 10/8/2018  2:40 PM 
 
4.  Enter the last four digits of your Social Security Number (xxxx) and Date of Birth (mm/dd/yyyy) as indicated and click Submit. You will be taken to the next sign-up page. 5. Create a Ziippi ID. This will be your Z[employer] login ID and cannot be changed, so think of one that is secure and easy to remember. 6. Create a Z[employer] password. You can change your password at any time. 7. Enter your Password Reset Question and Answer. This can be used at a later time if you forget your password. 8. Enter your e-mail address. You will receive e-mail notification when new information is available in [address] 9. Click Sign Up. You can now view and download portions of your medical record. 10. Click the Download Summary menu link to download a portable copy of your medical information. If you have questions, please visit the Frequently Asked Questions section of the [employer] website. Remember, [employer] is NOT to be used for urgent needs. For medical emergencies, dial 911. Now available from your iPhone and Android! Please provide this summary of care documentation to your next provider. Your primary care clinician is listed as Abel Bond. If you have any questions after today's visit, please call [phone].

## 2018-07-20 ENCOUNTER — TELEPHONE (OUTPATIENT)
Dept: FAMILY MEDICINE CLINIC | Age: 38
End: 2018-07-20

## 2018-07-20 NOTE — TELEPHONE ENCOUNTER
Called pt and she confirmed appointment, stating provider does take her insurance. Attempted to submit referral through [facility] portal and was unable to locate this provider 7/20/18.  Danyell

## 2018-07-20 NOTE — TELEPHONE ENCOUNTER
----- Message from Oleg Casanova sent at 7/20/2018  2:58 PM EDT -----  Regarding: Dr. Dawit Colbert is requesting a call back in regards to her referral for a mental health counselor.  She would like to add Dr. Nida Blank to her referral, for her appointment on July 26, 2018 @ 9 am. Best contact number: [phone]

## 2018-07-23 NOTE — TELEPHONE ENCOUNTER
Still unable to find this provider on [employer] portal and called office to confirm they do accept [employer] and that Dr. Roselle Siemens is a participating provider, but was only able to leave message to call back with this information 7/23/18. Referral submitted to [facility] under provider can't find with notation of pt's appointment 7/26/18 and is pending approval under authorization number [id number].   Danyell

## 2018-07-25 ENCOUNTER — OFFICE VISIT (OUTPATIENT)
Dept: FAMILY MEDICINE CLINIC | Age: 38
End: 2018-07-25

## 2018-07-25 VITALS
SYSTOLIC BLOOD PRESSURE: 126 MMHG | WEIGHT: 293 LBS | DIASTOLIC BLOOD PRESSURE: 81 MMHG | BODY MASS INDEX: 45.99 KG/M2 | HEART RATE: 88 BPM | TEMPERATURE: 98 F | HEIGHT: 67 IN | RESPIRATION RATE: 20 BRPM

## 2018-07-25 DIAGNOSIS — R10.2 PELVIC PAIN IN FEMALE: Primary | ICD-10-CM

## 2018-07-25 DIAGNOSIS — F32.2 DEPRESSION, MAJOR, SINGLE EPISODE, SEVERE (HCC): ICD-10-CM

## 2018-07-25 DIAGNOSIS — E66.01 MORBID OBESITY (HCC): ICD-10-CM

## 2018-07-25 NOTE — PATIENT INSTRUCTIONS
Mood: Start therapy  Weight: c/w diet, exercise, hydration, fiber  Pelvic Pain: ?Ovarian, will check pelvic u/s and labs

## 2018-07-25 NOTE — MR AVS SNAPSHOT
[address] 
[phone] Patient: Suze Massey MRN: [id number] IIP:[birth year] Visit Information Date & Time Provider Department Dept. Phone Encounter #  
 7/25/2018 11:15 AM Beth OrnelasPedro 34 [id number] Follow-up Instructions Return in about 4 weeks (around 8/22/2018), or if symptoms worsen or fail to improve. Your Appointments 7/25/2018 11:15 AM  
ROUTINE CARE with Beth Ornelas MD  
[address] [facility]) Appt Note: right abd pain; [employer] ins elig. CP $0 TOS 7/24/18 Ldm  
 320 [facility] 1007 [facility]  
[phone]  
  
   
 [address] [facility] [address] [address]  
  
    
 1/16/2019  1:20 PM  
ESTABLISHED PATIENT with Chad Sood NP  
[facility] ([facility]) Appt Note: 6 month follow up.  
 [address] [facility] [address] 600 1007 [facility]  
[address] [facility] [address] [address] Upcoming Health Maintenance Date Due  
 PAP AKA CERVICAL CYTOLOGY 6/20/2002 Influenza Age 5 to Adult 8/1/2018 MEDICARE YEARLY EXAM 2/1/2019 DTaP/Tdap/Td series (2 - Td) 10/6/2026 Allergies as of 7/25/2018  Review Complete On: 7/25/2018 By: Chloe Chance Severity Noted Reaction Type Reactions Sulfa (Sulfonamide Antibiotics)  04/03/2016    Rash Current Immunizations  Reviewed on 10/6/2016 Name Date  
 TB Skin Test (PPD) Intradermal 7/10/2018 Not reviewed this visit You Were Diagnosed With   
  
 Codes Comments Pelvic pain in female    -  Primary ICD-10-CM: R10.2 ICD-9-CM: 625.9 Morbid obesity ([address]     ICD-10-CM: E66.01 
ICD-9-CM: 278.01 Depression, major, single episode, severe ([address]     ICD-10-CM: F32.2 ICD-9-CM: 296.23 Vitals BP Pulse Temp Resp Height(growth percentile) Weight(growth percentile) 126/81 88 98 °F (36.7 °C) (Oral) 20 5' 7\" (1.702 m) (!) 417 lb (189.1 kg) BMI OB Status Smoking Status 65.31 kg/m2 Unknown Former Smoker Vitals History BMI and BSA Data Body Mass Index Body Surface Area  
 65.31 kg/m 2 2.99 m 2 Preferred Pharmacy Pharmacy Name Phone [id number] [place] [facility] [phone] Your Updated Medication List  
  
   
This list is accurate as of 7/25/18 11:05 AM.  Always use your most recent med list.  
  
  
  
  
 aspirin 325 mg tablet Commonly known as:  ASPIRIN Take 325 mg by mouth daily. Blood Pressure Test Kit-Large Kit Use to check BP at home, goal 140/90 or less FISH OIL PO Take 1 Cap by mouth daily. metoprolol tartrate 25 mg tablet Commonly known as:  LOPRESSOR  
TAKE 1 TABLET BY MOUTH TWICE A DAY  
  
 ONE-A-DAY WOMENS FORMULA PO Take 1 Tab by mouth daily. Follow-up Instructions Return in about 4 weeks (around 8/22/2018), or if symptoms worsen or fail to improve. To-Do List   
 07/25/2018 Imaging:  US TRANSVAGINAL Patient Instructions Mood: Start therapy Weight: c/w diet, exercise, hydration, fiber Pelvic Pain: ?Ovarian, will check pelvic u/s and labs Introducing [facility]! Krystal Gaston introduces [employer] patient portal. Now you can access parts of your medical record, email your doctor's office, and request medication refills online. 1. In your internet browser, go to [email or web address] 2. Click on the First Time User? Click Here link in the Sign In box. You will see the New Member Sign Up page. 3. Enter your [employer] Access Code exactly as it appears below. You will not need to use this code after youve completed the sign-up process.  If you do not sign up before the expiration date, you must request a new code. · [employer] Access Code: [email or web address] Expires: 10/8/2018  2:40 PM 
 
4. Enter the last four digits of your Social Security Number (xxxx) and Date of Birth (mm/dd/yyyy) as indicated and click Submit. You will be taken to the next sign-up page. 5. Create a [employer]Page ID. This will be your [employer] login ID and cannot be changed, so think of one that is secure and easy to remember. 6. Create a [employer] password. You can change your password at any time. 7. Enter your Password Reset Question and Answer. This can be used at a later time if you forget your password. 8. Enter your e-mail address. You will receive e-mail notification when new information is available in [address] 9. Click Sign Up. You can now view and download portions of your medical record. 10. Click the Download Summary menu link to download a portable copy of your medical information. If you have questions, please visit the Frequently Asked Questions section of the [employer] website. Remember, [employer] is NOT to be used for urgent needs. For medical emergencies, dial 911. Now available from your iPhone and Android! Please provide this summary of care documentation to your next provider. Your primary care clinician is listed as Ratna Wolf. If you have any questions after today's visit, please call [phone].

## 2018-07-25 NOTE — PROGRESS NOTES
HISTORY OF PRESENT ILLNESS  Clayton Low is a 41 y.o. female. HPI Comments: 42 yo Morbidly obese female here with 3 episodes over the last few months of specifically RLQ pain lasting about 5 m, feels like it is in her pelvis. Large pannus so she is unable to palpate on herself. She does not get her monthly menstrual cycles since her weight went over 375# and she is wondering if this might be related to her ovaries or any pelvic pathology. Also has had harder stools recently since she has been on a \"no carb\" diet--taking psyllium husk and also eating meat, veggies, trying to increase vegetables that are high in fiber to accomodate for this and drink water. No systemic signs of infect, no fever or chills. No associate nausea, vomiting, diarrhea, constipation, chest pain or shortness of breath. Change in bowel habits as noted but no change in urinary habits  Mood is not well controlled now but she is looking forward to starting therapy soon--there is some financial burden on her with this, she is trying to come up with the copay money for visiting therapy and working on getting established as a childcare provider. Her weight is coming down on her diet and she is motivated to continue with this. ROS A 12 point review of systems was negative except as noted here or in the HPI. Physical Exam   Constitutional: She is oriented to person, place, and time. She appears well-developed and well-nourished. No distress. NAD, Nontoxic, Appears Stated Age, morbidly obese, trach in place, capped   HENT:   Head: Normocephalic and atraumatic. Mouth/Throat: Oropharynx is clear and moist.   mmm   Eyes: Conjunctivae and EOM are normal. Right eye exhibits no discharge. Left eye exhibits no discharge. No scleral icterus. Neck: Neck supple. No thyromegaly present. +acanthosis nigricans   Cardiovascular: Normal rate, regular rhythm and normal heart sounds. No murmur heard.   Pulmonary/Chest: Effort normal and breath sounds normal. No stridor. No respiratory distress. She has no wheezes. She has no rales. She exhibits no tenderness. Abdominal: Soft. Bowel sounds are normal. She exhibits no distension. There is no tenderness. There is no rebound and no guarding. Difficult evaluation due to body habitus but no cvat, no suprapubic ttp, no specific palpable masses, pain with palpation, large pannus   Musculoskeletal: She exhibits no edema or tenderness. Neurological: She is alert and oriented to person, place, and time. No cranial nerve deficit. Grossly intact CN   Skin: Skin is warm and dry. No rash noted. She is not diaphoretic. Psychiatric: She has a normal mood and affect. Her behavior is normal.   Nursing note and vitals reviewed. ASSESSMENT and PLAN    ICD-10-CM ICD-9-CM    1. Pelvic pain in female R10.2 625.9 US TRANSVAGINAL   2. Morbid obesity ([address] E66.01 278.01    3. Depression, major, single episode, severe ([address] F32.2 296.23      Patient Instructions   Mood: Start therapy  Weight: c/w diet, exercise, hydration, fiber  Pelvic Pain: ?Ovarian, will check pelvic u/s and labs    1m follow up, have labs and u/s before visit    Jose Miguel Fields MD  [facility]  07/25/18 11:10 AM  Pt was counseled on risks, benefits and alternatives of treatment options. All questions were asked and answered and the patient was agreeable with the treatment plan as outlined.

## 2018-07-27 ENCOUNTER — TELEPHONE (OUTPATIENT)
Dept: FAMILY MEDICINE CLINIC | Age: 38
End: 2018-07-27

## 2018-07-27 DIAGNOSIS — R10.2 PELVIC PAIN: Primary | ICD-10-CM

## 2018-07-27 NOTE — TELEPHONE ENCOUNTER
Called pt, and left a voice message, asking that she call the office back. Central scheduling department will call pt with in 48 hours to schedule procedure.

## 2018-07-27 NOTE — TELEPHONE ENCOUNTER
Referral approved per [employer] effective for 1 visit 7/25/18 - 7/27/19, authorization number [id number] on 7/27/18.   Danyell

## 2018-07-27 NOTE — TELEPHONE ENCOUNTER
Recd fax from Arely Quezada to order pelvic ultrasound differently--have placed order and signed, please ensure patient can get scheduled    Maikel Serrano MD  [facility]  07/27/18 8:30 AM

## 2018-08-21 ENCOUNTER — OFFICE VISIT (OUTPATIENT)
Dept: FAMILY MEDICINE CLINIC | Age: 38
End: 2018-08-21

## 2018-08-21 VITALS
DIASTOLIC BLOOD PRESSURE: 74 MMHG | SYSTOLIC BLOOD PRESSURE: 122 MMHG | HEART RATE: 60 BPM | HEIGHT: 67 IN | WEIGHT: 293 LBS | BODY MASS INDEX: 45.99 KG/M2 | TEMPERATURE: 98.1 F | RESPIRATION RATE: 12 BRPM

## 2018-08-21 DIAGNOSIS — T14.8XXA BRUISING: ICD-10-CM

## 2018-08-21 DIAGNOSIS — E66.01 MORBID OBESITY (HCC): ICD-10-CM

## 2018-08-21 DIAGNOSIS — N92.6 IRREGULAR MENSTRUAL BLEEDING: Primary | ICD-10-CM

## 2018-08-21 NOTE — PROGRESS NOTES
Family Medicine Follow-Up Progress Note  Patient: Clayton Low  1980, 41 y.o., female  Encounter Date: 8/21/2018    ASSESSMENT & PLAN      ICD-10-CM ICD-9-CM    1. Irregular menstrual bleeding N92.6 626.4    2. Bruising T14. 8XXA 924.9    3. Morbid obesity ([facility] [address] E66.01 278.01    Irregular menstrual bleeding and cycles: please go for baseline labs as ordered. Needs pap and pelvic. Pt elects to see GYN, agrees to schedule. Suspect multifactorial but weight and also endocrine dysfunction may be playing a role  Bruising: monitor, check cbc, if spreading/changing return for re-eval  Morbid obesity: encouraged on diet, exercise, weight loss. Count calories and shoot for a 300 calorie deficit daily. Do not over restrict. This is a long term plan and goal, not a quick fix! CHIEF COMPLAINT  Chief Complaint   Patient presents with    Abdominal Pain     40 y/o female reports to office for 4 wk follow up. Pt states abdominal pain 1 wk. Pt states feels like mostly pressure. Pt states cycle irregular and breast soreness.  Skin Problem     Pt states new lesion are forming on left forearm. Pt states some pain x1 wk.  Weight Loss       SUBJECTIVE  Clayton Low is a 41 y.o. female presenting today for several concerns  Rash/bruising on LUE: pattern of three spots, all about 1 cm on L forearm, appeared about a week ago, one is red two are more bruised in color. No recollection of injury/carrying something heavy. DId in the past have plt dysfunction--more recently has had elevated platelets (?due to weight/chronic respiratory insufficiency). No other spots on body that patient noted. No itching, no new products/soaps/makeup/lotions. Irregular menstrual cycles: had first menstrual cycle last week--before this had been more than 1 year. Did pass clots, they were not bigger than an apricot.  This is normal for the patient when she does have cycles but when her weight goes up she reports she loses her menstrual cycles. Has not seen GYN in years because she reports she has not wanted to pay copay. She did not know that the majority of health maintenance type visits (well woman w pap for example) are generally covered by insurance providers. She wants to call her insurance to establish care with a GYN now. She had some dysmenorrhea and cramping along with breast tenderness. She also reports she sometimes gets a boil on her labia just before she has a cycle, and that happened this time as well but is now resolved. She declines physical exam.  She also had some suprapubic/low pelvic pressure associated with urinating. She notes this has resolved, no vaginal itching or irregular discharge per her report. Has not yet had labs done but plans for them tomorrow (TSH/A1c and CBC previously ordered in July). Has gained then lost weight since last visit--changed diet for a week then got back on track. Is trying to limit carbs and not yet exercising      Review of Systems   A 12 point review of systems was negative except as noted here or in the HPI. OBJECTIVE  Visit Vitals    /74 (BP 1 Location: Left arm, BP Patient Position: Sitting)    Pulse 60    Temp 98.1 °F (36.7 °C) (Oral)    Resp 12    Ht 5' 7\" (1.702 m)    Wt (!) 418 lb 8 oz (189.8 kg)    BMI 65.55 kg/m2       Physical Exam   Constitutional: She is oriented to person, place, and time. She appears well-developed and well-nourished. No distress. NAD, Nontoxic, Appears Stated Age, morbidly obese, trach in place, capped   HENT:   Head: Normocephalic and atraumatic. Mouth/Throat: Oropharynx is clear and moist.   mmm   Eyes: Conjunctivae and EOM are normal. Right eye exhibits no discharge. Left eye exhibits no discharge. No scleral icterus. ? proptosis   Neck: Neck supple. No thyromegaly present. +acanthosis nigricans   Cardiovascular: Normal rate, regular rhythm and normal heart sounds. No murmur heard.   Pulmonary/Chest: Effort normal and breath sounds normal. No stridor. No respiratory distress. She has no wheezes. She has no rales. She exhibits no tenderness. Abdominal: Soft. Bowel sounds are normal. She exhibits no distension. There is no tenderness. There is no rebound and no guarding. Large pannus, no cvat, no suprapubic ttp   Musculoskeletal: She exhibits no edema or tenderness. Neurological: She is alert and oriented to person, place, and time. No cranial nerve deficit. Grossly intact CN   Skin: Skin is warm and dry. No rash noted. She is not diaphoretic. Psychiatric: She has a normal mood and affect. Her behavior is normal.   Nursing note and vitals reviewed. No results found for any visits on 08/21/18. HISTORICAL  Reviewed and updated today, and as noted below:    Past Medical History:   Diagnosis Date    Depression, major, single episode, severe ([address] 11/16/2017    Morbid obesity ([facility] [address] 4/18/2016    Racing heart beat     Sleep apnea     Tracheostomy dependence ([facility] [address]     for severe sleep apnea      Past Surgical History:   Procedure Laterality Date    HX GYN  2002    removal of benign ovarian cyst    HX TRACHEOSTOMY  8/26/13    Due to sleep apnea     Family History   Problem Relation Age of Onset    Diabetes Mother     Hypertension Mother     Heart Disease Maternal Grandmother     No Known Problems Father      History   Smoking Status    Former Smoker    Packs/day: 2.00    Years: 10.00   Smokeless Tobacco    Never Used     Social History     Social History    Marital status: SINGLE     Spouse name: N/A    Number of children: N/A    Years of education: N/A     Social History Main Topics    Smoking status: Former Smoker     Packs/day: 2.00     Years: 10.00    Smokeless tobacco: Never Used    Alcohol use No    Drug use: No      Comment: Smoked Marijuana in [id number], but not current user. -vr    Sexual activity: No     Other Topics Concern    Not on file     Social History Narrative     Allergies   Allergen Reactions    Sulfa (Sulfonamide Antibiotics) Rash       Office Visit on 07/10/2018   Component Date Value Ref Range Status    PPD 07/10/2018 negative  Negative Final    mm Induration 07/10/2018 0  mm Final         Tatyana Kamara MD  [facility]  08/21/18 11:40 AM    Portions of this note may have been populated using smart dictation software and may have \"sounds-like\" errors present. Pt was counseled on risks, benefits and alternatives of treatment options. All questions were asked and answered and the patient was agreeable with the treatment plan as outlined.

## 2018-08-21 NOTE — PROGRESS NOTES
1. Have you been to the ER, urgent care clinic since your last visit? Hospitalized since your last visit? No    2. Have you seen or consulted any other health care providers outside of the [address] [place] [address] since your last visit? Include any pap smears or colon screening. No     Chief Complaint   Patient presents with    Abdominal Pain     40 y/o female reports to office for 4 wk follow up. Pt states abdominal pain 1 wk. Pt states feels like mostly pressure. Pt states cycle irregular and breast soreness.  Skin Problem     Pt states new lesion are forming on left forearm. Pt states some pain x1 wk. Health Maintenance Due   Topic Date Due    PAP AKA CERVICAL CYTOLOGY  06/20/2002    Influenza Age 5 to Adult  08/01/2018       Pt states unable to provide urine at this time.

## 2018-12-15 ENCOUNTER — HOSPITAL ENCOUNTER (EMERGENCY)
Age: 38
Discharge: HOME | End: 2018-12-15
Attending: EMERGENCY MEDICINE
Payer: MEDICARE

## 2018-12-15 ENCOUNTER — APPOINTMENT (OUTPATIENT)
Dept: GENERAL RADIOLOGY | Age: 38
End: 2018-12-15
Attending: EMERGENCY MEDICINE
Payer: MEDICARE

## 2018-12-15 VITALS
OXYGEN SATURATION: 98 % | BODY MASS INDEX: 45.99 KG/M2 | HEIGHT: 67 IN | RESPIRATION RATE: 21 BRPM | DIASTOLIC BLOOD PRESSURE: 79 MMHG | SYSTOLIC BLOOD PRESSURE: 135 MMHG | WEIGHT: 293 LBS | HEART RATE: 69 BPM | TEMPERATURE: 98.7 F

## 2018-12-15 DIAGNOSIS — R07.9 ACUTE CHEST PAIN: Primary | ICD-10-CM

## 2018-12-15 LAB
ALBUMIN SERPL-MCNC: 3.4 G/DL (ref 3.5–5)
ALBUMIN/GLOB SERPL: 0.7 {RATIO} (ref 1.1–2.2)
ALP SERPL-CCNC: 72 U/L (ref 45–117)
ALT SERPL-CCNC: 33 U/L (ref 12–78)
ANION GAP SERPL CALC-SCNC: 7 MMOL/L (ref 5–15)
AST SERPL-CCNC: 20 U/L (ref 15–37)
BASOPHILS # BLD: 0 K/UL (ref 0–0.1)
BASOPHILS NFR BLD: 1 % (ref 0–1)
BILIRUB SERPL-MCNC: 0.3 MG/DL (ref 0.2–1)
BUN SERPL-MCNC: 8 MG/DL (ref 6–20)
BUN/CREAT SERPL: 10 (ref 12–20)
CALCIUM SERPL-MCNC: 8.6 MG/DL (ref 8.5–10.1)
CHLORIDE SERPL-SCNC: 103 MMOL/L (ref 97–108)
CO2 SERPL-SCNC: 27 MMOL/L (ref 21–32)
COMMENT, HOLDF: NORMAL
CREAT SERPL-MCNC: 0.8 MG/DL (ref 0.55–1.02)
DIFFERENTIAL METHOD BLD: ABNORMAL
EOSINOPHIL # BLD: 0.1 K/UL (ref 0–0.4)
EOSINOPHIL NFR BLD: 2 % (ref 0–7)
ERYTHROCYTE [DISTWIDTH] IN BLOOD BY AUTOMATED COUNT: 14.7 % (ref 11.5–14.5)
GLOBULIN SER CALC-MCNC: 4.7 G/DL (ref 2–4)
GLUCOSE SERPL-MCNC: 94 MG/DL (ref 65–100)
HCT VFR BLD AUTO: 36.8 % (ref 35–47)
HGB BLD-MCNC: 11.7 G/DL (ref 11.5–16)
IMM GRANULOCYTES # BLD: 0 K/UL (ref 0–0.04)
IMM GRANULOCYTES NFR BLD AUTO: 0 % (ref 0–0.5)
LYMPHOCYTES # BLD: 1.5 K/UL (ref 0.8–3.5)
LYMPHOCYTES NFR BLD: 20 % (ref 12–49)
MCH RBC QN AUTO: 27.3 PG (ref 26–34)
MCHC RBC AUTO-ENTMCNC: 31.8 G/DL (ref 30–36.5)
MCV RBC AUTO: 86 FL (ref 80–99)
MONOCYTES # BLD: 0.6 K/UL (ref 0–1)
MONOCYTES NFR BLD: 8 % (ref 5–13)
NEUTS SEG # BLD: 5.2 K/UL (ref 1.8–8)
NEUTS SEG NFR BLD: 70 % (ref 32–75)
NRBC # BLD: 0 K/UL (ref 0–0.01)
NRBC BLD-RTO: 0 PER 100 WBC
PLATELET # BLD AUTO: 371 K/UL (ref 150–400)
PMV BLD AUTO: 8.5 FL (ref 8.9–12.9)
POTASSIUM SERPL-SCNC: 3.8 MMOL/L (ref 3.5–5.1)
PROT SERPL-MCNC: 8.1 G/DL (ref 6.4–8.2)
RBC # BLD AUTO: 4.28 M/UL (ref 3.8–5.2)
SAMPLES BEING HELD,HOLD: NORMAL
SODIUM SERPL-SCNC: 137 MMOL/L (ref 136–145)
TROPONIN I BLD-MCNC: <0.04 NG/ML (ref 0–0.08)
TROPONIN I SERPL-MCNC: <0.05 NG/ML
WBC # BLD AUTO: 7.4 K/UL (ref 3.6–11)

## 2018-12-15 PROCEDURE — 80053 COMPREHEN METABOLIC PANEL: CPT

## 2018-12-15 PROCEDURE — 84484 ASSAY OF TROPONIN QUANT: CPT

## 2018-12-15 PROCEDURE — 99283 EMERGENCY DEPT VISIT LOW MDM: CPT

## 2018-12-15 PROCEDURE — 71046 X-RAY EXAM CHEST 2 VIEWS: CPT

## 2018-12-15 PROCEDURE — 93005 ELECTROCARDIOGRAM TRACING: CPT

## 2018-12-15 PROCEDURE — 36415 COLL VENOUS BLD VENIPUNCTURE: CPT

## 2018-12-15 PROCEDURE — 85025 COMPLETE CBC W/AUTO DIFF WBC: CPT

## 2018-12-15 NOTE — ED PROVIDER NOTES
41 y.o. female with past medical history significant for sleep apnea, tachycardia, and depression who presents from home via personal vehicle with chief complaint of chest pain. Pt reports to ED today following intermittent episodes of chest and epigastric pain that began a couple of days ago. Pt decided to come to the ED due to the chest pain which began yesterday and was also experienced earlier today. Pt describes episodes of chest pain lasting 5-7 minutes which radiated to the left side of her chest. Pt denies nausea, dyspnea, light-headedness, dizziness, or emesis. There are no other acute medical concerns at this time. Surgical hx - tracheostomy, benign cyst removal  Social hx - Tobacco use: former smoker, Alcohol Use: non-drinker    PCP: Sana Wade MD    Note written by Samanta Patel, as dictated by No att. providers found 2:45 PM.      The history is provided by the patient. No  was used.         Past Medical History:   Diagnosis Date    Depression, major, single episode, severe ([address] 11/16/2017    Morbid obesity ([address] 4/18/2016    Racing heart beat     Sleep apnea     Tracheostomy dependence ([facility] [address]     for severe sleep apnea        Past Surgical History:   Procedure Laterality Date    HX GYN  2002    removal of benign ovarian cyst    HX TRACHEOSTOMY  8/26/13    Due to sleep apnea         Family History:   Problem Relation Age of Onset    Diabetes Mother     Hypertension Mother     Heart Disease Maternal Grandmother     No Known Problems Father        Social History     Socioeconomic History    Marital status: SINGLE     Spouse name: Not on file    Number of children: Not on file    Years of education: Not on file    Highest education level: Not on file   Social Needs    Financial resource strain: Not on file    Food insecurity - worry: Not on file    Food insecurity - inability: Not on file    Transportation needs - medical: Not on file   [place] Transportation needs - non-medical: Not on file   Occupational History    Not on file   Tobacco Use    Smoking status: Former Smoker     Packs/day: 2.00     Years: 10.00     Pack years: 20.00    Smokeless tobacco: Never Used   Substance and Sexual Activity    Alcohol use: No     Alcohol/week: 0.0 oz    Drug use: No     Comment: Smoked Marijuana in [id number], but not current user. -vr    Sexual activity: No   Other Topics Concern    Not on file   Social History Narrative    Not on file         ALLERGIES: Sulfa (sulfonamide antibiotics)    Review of Systems   Constitutional: Negative for chills and fever. HENT: Negative for rhinorrhea and sore throat. Respiratory: Negative for cough and shortness of breath. Cardiovascular: Positive for chest pain. Gastrointestinal: Negative for abdominal pain, diarrhea, nausea and vomiting. Genitourinary: Negative for dysuria and urgency. Musculoskeletal: Negative for arthralgias and back pain. Skin: Negative for rash. Neurological: Negative for dizziness, weakness and light-headedness. There were no vitals filed for this visit. Physical Exam     Vital signs reviewed. Nursing notes reviewed.     Const:  No acute distress, well developed, well nourished  Head:  Atraumatic, normocephalic  Eyes:  PERRL, conjunctiva normal, no scleral icterus  Neck:  Supple, trach collar in place  Cardiovascular:  RRR, no murmurs, no gallops, no rubs  Resp:  No resp distress, no increased work of breathing, no wheezes, no rhonchi, no rales,  Abd:  Soft, non-tender, non-distended, no rebound, no guarding, no CVA tenderness  :  Deferred  MSK:  No pedal edema, normal ROM  Neuro:  Alert and oriented x3, no cranial nerve defect  Skin:  Warm, dry, intact  Psych: normal mood and affect, behavior is normal, judgement and thought content is normal          MDM  Number of Diagnoses or Management Options  Acute chest pain:      Amount and/or Complexity of Data Reviewed  Clinical lab tests: ordered and reviewed  Tests in the radiology section of CPT®: ordered and reviewed  Review and summarize past medical records: yes    Patient Progress  Patient progress: stable          Pt. Presents to the ER with complaints of atypical chest pain. Pt. Has been free in the ER. Negative cardiac enzymes. No mass, pneumothorax, or other acute process on xray. Pt. To f/u with her PCP and cardiology or return to the ER with worsening sx.     Procedures

## 2018-12-15 NOTE — DISCHARGE INSTRUCTIONS

## 2018-12-16 LAB
ATRIAL RATE: 67 BPM
CALCULATED P AXIS, ECG09: 65 DEGREES
CALCULATED R AXIS, ECG10: -22 DEGREES
CALCULATED T AXIS, ECG11: -11 DEGREES
DIAGNOSIS, 93000: NORMAL
P-R INTERVAL, ECG05: 160 MS
Q-T INTERVAL, ECG07: 392 MS
QRS DURATION, ECG06: 94 MS
QTC CALCULATION (BEZET), ECG08: 414 MS
VENTRICULAR RATE, ECG03: 67 BPM

## 2018-12-17 ENCOUNTER — PATIENT OUTREACH (OUTPATIENT)
Dept: FAMILY MEDICINE CLINIC | Age: 38
End: 2018-12-17

## 2018-12-17 NOTE — PROGRESS NOTES
ED Discharge Follow-Up    Date/Time:     2018 8:11 AM    Patient presented to [place] [address]  ED on 12/15/18 and was diagnosed with Acute chest pain. Top Challenges reviewed with the provider   Acute chest pain           Method of communication with provider :none    Nurse Navigator(NN) contacted the  patient  by telephone to perform post ED discharge assessment. Verified name and  with patient as identifiers. Provided introduction to self, and explanation of the Nurse Navigator role. Patient reported assessment: Patient states she feeling better today, patient denies any chest pains or sob at this time    Medication(s):   New Medications at Discharge: NA  Changed Medications at Discharge: NA  Discontinued Medications at Discharge: NA    There were no barriers to obtaining medications identified at this time. Reviewed discharge instructions and red flags with  patient who voiced understanding. Patient given an opportunity to ask questions and does not have any further questions or concerns at this time. The patient agrees to contact the PCP office for questions related to their healthcare. Patient reminded that there are physicians on call 24 hours a day / 7 days a week (M- 5pm to 8am and from Friday 5pm until Monday 8am for the weekend) should the patient have questions or concerns. NN provided contact information for future reference. Offered follow up appointment with PCP: no   [facility] follow up appointment(s):   Future Appointments   Date Time Provider Lynette Cardona   2019  1:20 PM Jaden Hager NP [address]      Non-[facility] follow up appointment(s): [facility]  [facility]:  n/a    www. [employer] 9 AM- 9 PM.   Phone [phone]

## 2018-12-31 ENCOUNTER — PATIENT OUTREACH (OUTPATIENT)
Dept: FAMILY MEDICINE CLINIC | Age: 38
End: 2018-12-31

## 2018-12-31 NOTE — PROGRESS NOTES
Follow up call placed to patient. Patient presented to [place] [address]  ED on 12/15/18 and was diagnosed with Acute chest pain.   
 
No answer, message left requesting return call. Will continue to follow up with patient regarding ED follow up.

## 2019-01-14 ENCOUNTER — PATIENT OUTREACH (OUTPATIENT)
Dept: FAMILY MEDICINE CLINIC | Age: 39
End: 2019-01-14

## 2019-01-14 NOTE — LETTER
1/14/2019 12:58 PM 
 
Ms. Tanna Huerta. [address] [id number] Care management goals have been completed at this time. No further nurse navigator follow up scheduled. Pt has nurse navigator's contact information for any further questions, concerns, or needs. Patients upcoming visits:   
Future Appointments Date Time Provider Lynette Cardona 2/4/2019 11:10 AM Sanjana Gabriel  [facility]  
2/13/2019  3:00 PM Winsome Baker NP [address] [facility] Sincerely, Chuck Covington LPN

## 2019-01-14 NOTE — PROGRESS NOTES
Patient has graduated from the Transitions of Care Coordination  program on 1/14/19. Patient's symptoms are stable at this time. Patient/family has the ability to self-manage. Care management goals have been completed at this time. No further nurse navigator follow up scheduled. Pt has nurse navigator's contact information for any further questions, concerns, or needs. Patients upcoming visits:   
Future Appointments Date Time Provider Lynette Cardona 2/4/2019 11:10 AM Fe Faith MD [address] [place] [address]  
2/13/2019  3:00 PM Sukhdev العلي NP [facility]

## 2019-02-12 ENCOUNTER — TELEPHONE (OUTPATIENT)
Dept: CARDIOLOGY CLINIC | Age: 39
End: 2019-02-12

## 2019-02-12 NOTE — TELEPHONE ENCOUNTER
Pt canceled appt with NP for Dr. Leeanna Vargas on 2/13/19. She Is wondering if she can r/s her follow up but does not want to wait until NP next available which is in April. Aniya might have something sooner?      Phone: [phone]

## 2019-02-13 ENCOUNTER — OFFICE VISIT (OUTPATIENT)
Dept: OBGYN CLINIC | Age: 39
End: 2019-02-13

## 2019-02-13 VITALS
BODY MASS INDEX: 45.99 KG/M2 | SYSTOLIC BLOOD PRESSURE: 146 MMHG | HEIGHT: 67 IN | WEIGHT: 293 LBS | HEART RATE: 75 BPM | DIASTOLIC BLOOD PRESSURE: 88 MMHG

## 2019-02-13 DIAGNOSIS — Z01.419 ENCOUNTER FOR WELL WOMAN EXAM WITH ROUTINE GYNECOLOGICAL EXAM: Primary | ICD-10-CM

## 2019-02-13 DIAGNOSIS — E66.01 MORBID OBESITY (HCC): ICD-10-CM

## 2019-02-13 DIAGNOSIS — N91.2 AMENORRHEA: ICD-10-CM

## 2019-02-13 RX ORDER — METOPROLOL TARTRATE 25 MG/1
TABLET, FILM COATED ORAL
Qty: 180 TAB | Refills: 3 | Status: SHIPPED | OUTPATIENT
Start: 2019-02-13 | End: 2020-04-30

## 2019-02-13 RX ORDER — HYDROCHLOROTHIAZIDE 12.5 MG/1
12.5 CAPSULE ORAL DAILY
COMMUNITY
End: 2020-04-07

## 2019-02-13 NOTE — PROGRESS NOTES
Annual exam ages 21-44  (new patient)    Kosta Ahuja is a No obstetric history on file. ,  41 y.o. female [address] No LMP recorded (lmp unknown). Patient is not currently having periods (Reason: Polycystic Ovarian Syndrome). , who presents for her annual checkup. Passed small amount of pinkish/red clots about a week ago. Previous period ws 3-4 months prior. May go a year between cycles. CHTN. Takes HCTZ \"prn\". Does not check BP at home. Did have home health nurse coming for a while, but no longer covered by insurance. Takes metoprolol for tachycardia. Did not take today. ADDITIONAL CONCERNS:  She is having no significant problems. With regard to the Gardasil vaccine, she has not received it yet. Menstrual status:    Her periods are absent in flow. She is using essentially none pads or tampons per day, has not had consistent cycles, reports due to weight. She denies dysmenorrhea. She denies premenstrual symptoms. Contraception:    The current method of family planning is none and abstinence. Sexual history:     She  reports that she is not currently sexually active. Has been sexually active in the past.      Pap and Mammogram History:    Her most recent Pap smear was normal, obtained - unknown date (approximately 3yrs). She does not have a history of abnormal paps.     The patient has never had a mammogram.    Breast Cancer History/Substance Abuse: Positive= Maternal Great Aunt / Negative     Past Medical History:   Diagnosis Date    Depression, major, single episode, severe ([address] 11/16/2017    Morbid obesity ([address] 4/18/2016    Racing heart beat     Sleep apnea     Tracheostomy dependence ([address]     for severe sleep apnea      Past Surgical History:   Procedure Laterality Date    HX GYN  2002    removal of benign ovarian cyst    HX TRACHEOSTOMY  8/26/13    Due to sleep apnea     OB History   No data available       Current Outpatient Medications Medication Sig Dispense Refill    hydroCHLOROthiazide (MICROZIDE) 12.5 mg capsule Take 12.5 mg by mouth daily.  aspirin (ASPIRIN) 325 mg tablet Take 325 mg by mouth daily.  multivit,calc,mins/iron/folic (ONE-A-DAY WOMENS FORMULA PO) Take 1 Tab by mouth daily.  metoprolol tartrate (LOPRESSOR) 25 mg tablet TAKE 1 TABLET BY MOUTH TWICE A  Tab 3    Blood Pressure Test Kit-Large kit Use to check BP at home, goal 140/90 or less 1 Kit 0    docosahexanoic acid/epa (FISH OIL PO) Take 1 Cap by mouth daily. Allergies: Sulfa (sulfonamide antibiotics)   Social History     Socioeconomic History    Marital status: SINGLE     Spouse name: Not on file    Number of children: Not on file    Years of education: Not on file    Highest education level: Not on file   Social Needs    Financial resource strain: Not on file    Food insecurity - worry: Not on file    Food insecurity - inability: Not on file    Transportation needs - medical: Not on file   [employer] needs - non-medical: Not on file   Occupational History    Not on file   Tobacco Use    Smoking status: Former Smoker     Packs/day: 2.00     Years: 10.00     Pack years: 20.00    Smokeless tobacco: Never Used   Substance and Sexual Activity    Alcohol use: No     Alcohol/week: 0.0 oz    Drug use: No     Comment: Smoked Marijuana in 6175-4302, but not current user. -vr    Sexual activity: No   Other Topics Concern    Not on file   Social History Narrative    Not on file     Tobacco History:  reports that she has quit smoking. She has a 20.00 pack-year smoking history. she has never used smokeless tobacco.  Alcohol Abuse:  reports that she does not drink alcohol. Drug Abuse:  reports that she does not use drugs.     Patient Active Problem List   Diagnosis Code    Morbid obesity ([facility] E66.01    MATEUSZ on CPAP G47.33, Z99.89    [facility] present ([facility] Z93.0    Former heavy tobacco smoker Z87.891    MEGHAN (generalized anxiety disorder) F41.1    Prediabetes R73.03    Depression, major, single episode, severe (HCC) F32.2    Thrombocytopenia (HCC) D69.6    IFG (impaired fasting glucose) R73.01    Palpitations R00.2     Family History   Problem Relation Age of Onset    Diabetes Mother     Hypertension Mother     Heart Disease Maternal Grandmother     No Known Problems Father     Breast Cancer Maternal Aunt         Great Aunt - ?breast ?bone    Cancer Other         great aunt - not sure what type of cancer       Review of Systems - History obtained from the patient  Constitutional: negative for weight loss, fever, night sweats  HEENT: negative for hearing loss, earache, congestion, snoring, sorethroat  CV: negative for chest pain, palpitations, edema  Resp: negative for cough, shortness of breath, wheezing  GI: negative for change in bowel habits, abdominal pain, black or bloody stools  : negative for frequency, dysuria, hematuria, vaginal discharge  MSK: negative for back pain, joint pain, muscle pain  Breast: negative for breast lumps, nipple discharge, galactorrhea  Skin :negative for itching, rash, hives  Neuro: negative for dizziness, headache, confusion, weakness  Psych: negative for anxiety, change in mood  Heme/lymph: negative for bleeding, bruising, pallor    Physical Exam    Visit Vitals  BP (!) 143/91 Comment: Has not taken her BP med yet   Pulse 81   Ht 5' 7\" (1.702 m)   Wt (!) 414 lb (187.8 kg)   LMP  (LMP Unknown)   BMI 64.84 kg/m²     Rpt NF=028/88    Constitutional  · Appearance: well-nourished, well developed, alert, in no acute distress    HENT  · Head and Face: appears normal    Neck  · Inspection/Palpation: normal appearance, no masses or tenderness  · Lymph Nodes: no lymphadenopathy present  · Thyroid: gland size normal, nontender, no nodules or masses present on palpation    Chest  · Respiratory Effort: breathing unlabored  · Auscultation: normal breath sounds    Cardiovascular  · Heart:  · Auscultation: regular rate and rhythm without murmur    Breasts  · Inspection of Breasts: breasts symmetrical, no skin changes, no discharge present, nipple appearance normal, no skin retraction present  · Palpation of Breasts and Axillae: no masses present on palpation, no breast tenderness  · Axillary Lymph Nodes: no lymphadenopathy present    Gastrointestinal  · Abdominal Examination: abdomen non-tender to palpation, normal bowel sounds, no masses present  · Liver and spleen: no hepatomegaly present, spleen not palpable  · Hernias: no hernias identified    Genitourinary  · External Genitalia: normal appearance for age, no discharge present, no tenderness present, no inflammatory lesions present, no masses present, no atrophy present  · Vagina: normal vaginal vault without central or paravaginal defects, no discharge present, no inflammatory lesions present, no masses present  · Bladder: non-tender to palpation  · Urethra: appears normal  · Cervix: normal   · Uterus: bimanual limited by habitus, no masses appreciated, NT, uterus does not appear to be enlarged. · Adnexa: no adnexal tenderness present, no adnexal masses present - exam limited by habitus  · Perineum: perineum within normal limits, no evidence of trauma, no rashes or skin lesions present  · Anus: anus within normal limits, no hemorrhoids present  · Inguinal Lymph Nodes: no lymphadenopathy present    Skin  · General Inspection: no rash, no lesions identified    Neurologic/Psychiatric  · Mental Status:  · Orientation: grossly oriented to person, place and time  · Mood and Affect: mood normal, affect appropriate      Assessment & Plan:  · Routine gynecologic examination. Pap/HPV done  · Amenorrhea. At risk for endometrial hyperplasia/cancer -> schedule EMB  · HTN. Takes HCTZ \"prn\", metoprolol for tachycardia. BP elevated today. Adv to f/u with PCP.   · Counseled re: diet, exercise, healthy lifestyle  · Return for yearly wellness visits  · Gardisil counseling provided  · Pt counseled regarding co-testing for high risk HPV with pap  · Rec screening mammo  · Patient verbalized understanding      Orders Placed This Encounter    hydroCHLOROthiazide (MICROZIDE) 12.5 mg capsule     Sig: Take 12.5 mg by mouth daily.  PAP IG, HPV AND RFX HPV 41/92,51([id number])     Order Specific Question:   Pap Source? Answer:   Cervical and Endocervical     Order Specific Question:   Total Hysterectomy? Answer:   No     Order Specific Question:   Supracervical Hysterectomy? Answer:   No     Order Specific Question:   Post Menopausal?     Answer:   No     Order Specific Question:   Hormone Therapy? Answer:   No     Order Specific Question:   IUD? Answer:   No     Order Specific Question:   Abnormal Bleeding? Answer:   No     Order Specific Question:   Pregnant     Answer:   No     Order Specific Question:   Post Partum? Answer:    No

## 2019-02-13 NOTE — PATIENT INSTRUCTIONS
"[employer] Help Desk: [phone]       Well Visit, Ages 25 to 48: Care Instructions  Your Care Instructions    Physical exams can help you stay healthy. Your doctor has checked your overall health and may have suggested ways to take good care of yourself. He or she also may have recommended tests. At home, you can help prevent illness with healthy eating, regular exercise, and other steps. Follow-up care is a key part of your treatment and safety. Be sure to make and go to all appointments, and call your doctor if you are having problems. It's also a good idea to know your test results and keep a list of the medicines you take. How can you care for yourself at home? · Reach and stay at a healthy weight. This will lower your risk for many problems, such as obesity, diabetes, heart disease, and high blood pressure. · Get at least 30 minutes of physical activity on most days of the week. Walking is a good choice. You also may want to do other activities, such as running, swimming, cycling, or playing tennis or team sports. Discuss any changes in your exercise program with your doctor. · Do not smoke or allow others to smoke around you. If you need help quitting, talk to your doctor about stop-smoking programs and medicines. These can increase your chances of quitting for good. · Talk to your doctor about whether you have any risk factors for sexually transmitted infections (STIs). Having one sex partner (who does not have STIs and does not have sex with anyone else) is a good way to avoid these infections. · Use birth control if you do not want to have children at this time. Talk with your doctor about the choices available and what might be best for you. · Protect your skin from too much sun. When you're outdoors from 10 a.m. to 4 p.m., stay in the shade or cover up with clothing and a hat with a wide brim. Wear sunglasses that block UV rays.  Even when it's cloudy, put broad-spectrum sunscreen (SPF 30 or higher) on any exposed skin. · See a dentist one or two times a year for checkups and to have your teeth cleaned. · Wear a seat belt in the car. · Drink alcohol in moderation, if at all. That means no more than 2 drinks a day for men and 1 drink a day for women. Follow your doctor's advice about when to have certain tests. These tests can spot problems early. For everyone  · Cholesterol. Have the fat (cholesterol) in your blood tested after age 21. Your doctor will tell you how often to have this done based on your age, family history, or other things that can increase your risk for heart disease. · Blood pressure. Have your blood pressure checked during a routine doctor visit. Your doctor will tell you how often to check your blood pressure based on your age, your blood pressure results, and other factors. · Vision. Talk with your doctor about how often to have a glaucoma test.  · Diabetes. Ask your doctor whether you should have tests for diabetes. · Colon cancer. Have a test for colon cancer at age 48. You may have one of several tests. If you are younger than 48, you may need a test earlier if you have any risk factors. Risk factors include whether you already had a precancerous polyp removed from your colon or whether your parent, brother, sister, or child has had colon cancer. For women  · Breast exam and mammogram. Talk to your doctor about when you should have a clinical breast exam and a mammogram. Medical experts differ on whether and how often women under 50 should have these tests. Your doctor can help you decide what is right for you. · Pap test and pelvic exam. Begin Pap tests at age 24. A Pap test is the best way to find cervical cancer. The test often is part of a pelvic exam. Ask how often to have this test.  · Tests for sexually transmitted infections (STIs). Ask whether you should have tests for STIs.  You may be at risk if you have sex with more than one person, especially if your partners do not wear condoms. For men  · Tests for sexually transmitted infections (STIs). Ask whether you should have tests for STIs. You may be at risk if you have sex with more than one person, especially if you do not wear a condom. · Testicular cancer exam. Ask your doctor whether you should check your testicles regularly. · Prostate exam. Talk to your doctor about whether you should have a blood test (called a PSA test) for prostate cancer. Experts differ on whether and when men should have this test. Some experts suggest it if you are older than 39 and are -American or have a father or brother who got prostate cancer when he was younger than 72. When should you call for help? Watch closely for changes in your health, and be sure to contact your doctor if you have any problems or symptoms that concern you. Where can you learn more? Go to http://ethan-anupam.info/. Enter P072 in the search box to learn more about \"Well Visit, Ages 25 to 48: Care Instructions. \"  Current as of: March 28, 2018  Content Version: 11.9  © 2760-1362 [employer]. Care instructions adapted under license by "[employer] (which disclaims liability or warranty for this information). If you have questions about a medical condition or this instruction, always ask your healthcare professional. [address] any warranty or liability for your use of this information.

## 2019-02-16 LAB
CYTOLOGIST CVX/VAG CYTO: NORMAL
CYTOLOGY CVX/VAG DOC THIN PREP: NORMAL
DX ICD CODE: NORMAL
HPV I/H RISK 1 DNA CVX QL PROBE+SIG AMP: NEGATIVE
Lab: NORMAL
OTHER STN SPEC: NORMAL
PATH REPORT.FINAL DX SPEC: NORMAL
STAT OF ADQ CVX/VAG CYTO-IMP: NORMAL

## 2019-02-20 NOTE — TELEPHONE ENCOUNTER
Patient states she was in ED at [facility] last night and states he heart was racing. Her rate was 125. She also states he had a low grade temp of 99. She is calling because now she has a temperature of 102.3 and would like to be advised on what to do.      Phone: [phone]

## 2019-02-26 ENCOUNTER — OFFICE VISIT (OUTPATIENT)
Dept: OBGYN CLINIC | Age: 39
End: 2019-02-26

## 2019-02-26 ENCOUNTER — TELEPHONE (OUTPATIENT)
Dept: CARDIOLOGY CLINIC | Age: 39
End: 2019-02-26

## 2019-02-26 VITALS — HEIGHT: 67 IN | BODY MASS INDEX: 64.84 KG/M2

## 2019-02-26 DIAGNOSIS — N91.2 AMENORRHEA: Primary | ICD-10-CM

## 2019-02-26 DIAGNOSIS — Z32.02 NEGATIVE PREGNANCY TEST: ICD-10-CM

## 2019-02-26 RX ORDER — MEDROXYPROGESTERONE ACETATE 10 MG/1
TABLET ORAL
Qty: 30 TAB | Refills: 3 | Status: CANCELLED | OUTPATIENT
Start: 2019-02-26

## 2019-03-04 ENCOUNTER — OFFICE VISIT (OUTPATIENT)
Dept: CARDIOLOGY CLINIC | Age: 39
End: 2019-03-04

## 2019-03-04 VITALS
OXYGEN SATURATION: 97 % | WEIGHT: 293 LBS | DIASTOLIC BLOOD PRESSURE: 84 MMHG | HEART RATE: 72 BPM | BODY MASS INDEX: 45.99 KG/M2 | HEIGHT: 67 IN | RESPIRATION RATE: 20 BRPM | SYSTOLIC BLOOD PRESSURE: 126 MMHG

## 2019-03-04 DIAGNOSIS — R00.2 PALPITATIONS: Primary | ICD-10-CM

## 2019-03-04 NOTE — PROGRESS NOTES
HISTORY OF PRESENTING ILLNESS      Brigitte Davis is a 41 y.o. female with MATEUSZ, tracheostomy, obesity who hds experienced recurrent highly symptomatic palpitations which had been challenging to document despite monitoring and presentation to ER. She underwent 30 day monitor which demonstrated sinus rhythm as well as long RP tachycardias with rates in the 150's. She reported numerous episodes of chest pain/pressure/palpitations during which her heart rate was normal. She also reported palpitations during episodes of long RP tachycardias. She reported feeling poorly with metoprolol 25 mg BID in that she felt chest pain and felt that her heart rate was too slow. Her dose was lowered to 12.5 mg BID subsequently. She noted her palpitations awakened her from sleep occasionally however overall her palpitation burden was improving and that her chest pain and pressure has resolved. She continued to have occasional episodes of tachycardia that would last up to 5 minutes. She reported approximately 2 episodes per month. Last visit we discussed the option of adding diltiazem 60 mg twice daily to her current regimen however she opted to avoid additional medications and continued her current dose of metoprolol with plan to administer additional doses of metoprolol as needed for occasional episodes of tachycardia. She now presents for follow-up. She was recently hospitalized for a viral syndrome during which she reports her heart rate escalated to the 120s with an increase in palpitations. Her heart rate subsequently improved and lowered down into the 60s-70s at times. She is recovering from her viral illness now and reports that prior to her hospitalization she had adequate control of her palpitations.        ACTIVE PROBLEM LIST     Patient Active Problem List    Diagnosis Date Noted    Palpitations 07/13/2018    Thrombocytopenia ([address] 07/10/2018    IFG (impaired fasting glucose) 07/10/2018    Depression, major, single episode, severe ([facility] 2017    Former heavy tobacco smoker 2016    MEGHAN (generalized anxiety disorder) 2016    Prediabetes 2016    Morbid obesity ([address] 2016    MATEUSZ on CPAP 2016    Tracheostomy present ([address] 2016           PAST MEDICAL HISTORY     Past Medical History:   Diagnosis Date    Depression, major, single episode, severe ([address] 2017    Morbid obesity ([facility] 2016    Racing heart beat     Routine Papanicolaou smear 2019    Negative ; HPV Negative     Sleep apnea     Tracheostomy dependence ([facility]     for severe sleep apnea            PAST SURGICAL HISTORY     Past Surgical History:   Procedure Laterality Date    HX GYN      removal of benign ovarian cyst - per pt done in OR, under anesthesia, but states had no incisions    HX TRACHEOSTOMY  13    Due to sleep apnea          ALLERGIES     Allergies   Allergen Reactions    Sulfa (Sulfonamide Antibiotics) Rash          FAMILY HISTORY     Family History   Problem Relation Age of Onset    Diabetes Mother     Hypertension Mother     Heart Disease Maternal Grandmother     No Known Problems Father     Breast Cancer Maternal Aunt         Great Aunt - ?breast ?bone    Cancer Other         great aunt - not sure what type of cancer    negative for cardiac disease       SOCIAL HISTORY     Social History     Socioeconomic History    Marital status: SINGLE     Spouse name: Not on file    Number of children: Not on file    Years of education: Not on file    Highest education level: Not on file   Tobacco Use    Smoking status: Former Smoker     Packs/day: 2.00     Years: 10.00     Pack years: 20.00     Last attempt to quit:      Years since quittin.1    Smokeless tobacco: Never Used   Substance and Sexual Activity    Alcohol use: No     Alcohol/week: 0.0 oz    Drug use: No     Comment: Smoked Marijuana in 4445-1604, but not current user. -vr    Sexual activity: Not Currently         MEDICATIONS     Current Outpatient Medications   Medication Sig    metoprolol tartrate (LOPRESSOR) 25 mg tablet TAKE 1 TABLET BY MOUTH TWICE A DAY    hydroCHLOROthiazide (MICROZIDE) 12.5 mg capsule Take 12.5 mg by mouth daily.  aspirin (ASPIRIN) 325 mg tablet Take 325 mg by mouth daily.  multivit,calc,mins/iron/folic (ONE-A-DAY WOMENS FORMULA PO) Take 1 Tab by mouth daily.  docosahexanoic acid/epa (FISH OIL PO) Take 1 Cap by mouth daily.  Blood Pressure Test Kit-Large kit Use to check BP at home, goal 140/90 or less     No current facility-administered medications for this visit. I have reviewed the nurses notes, vitals, problem list, allergy list, medical history, family, social history and medications. REVIEW OF SYMPTOMS      General: Pt denies excessive weight gain or loss. Pt is able to conduct ADL's  HEENT: Denies blurred vision, headaches, hearing loss, epistaxis and difficulty swallowing. Respiratory: Denies cough, congestion, shortness of breath, PIZANO, wheezing or stridor. Cardiovascular: Denies precordial pain, palpitations, edema or PND  Gastrointestinal: Denies poor appetite, indigestion, abdominal pain or blood in stool  Genitourinary: Denies hematuria, dysuria, increased urinary frequency  Musculoskeletal: Denies joint pain or swelling from muscles or joints  Neurologic: Denies tremor, paresthesias, headache, or sensory motor disturbance  Psychiatric: Denies confusion, insomnia, depression  Integumentray: Denies rash, itching or ulcers. Hematologic: Denies easy bruising, bleeding       PHYSICAL EXAMINATION      There were no vitals filed for this visit. General: Well developed, in no acute distress. HEENT: No jaundice, oral mucosa moist, no oral ulcers  Neck: Supple, no stiffness, no lymphadenopathy, supple  Heart:  Normal S1/S2 negative S3 or S4.  Regular, no murmur, gallop or rub, no jugular venous distention  Respiratory: Clear bilaterally x 4, no wheezing or rales  Abdomen:   Soft, non-tender, bowel sounds are active.   Extremities:  No edema, normal cap refill, no cyanosis. Musculoskeletal: No clubbing, no deformities  Neuro: A&Ox3, speech clear, gait stable, cooperative, no focal neurologic deficits  Skin: Skin color is normal. No rashes or lesions. Non diaphoretic, moist.  Vascular: 2+ pulses symmetric in all extremities       DIAGNOSTIC DATA      EKG:        LABORATORY DATA      Lab Results   Component Value Date/Time    WBC 7.4 12/15/2018 03:09 PM    Hemoglobin (POC) 12.6 2016 06:46 AM    HGB 11.7 12/15/2018 03:09 PM    Hematocrit (POC) 37 2016 06:46 AM    HCT 36.8 12/15/2018 03:09 PM    PLATELET 252 99 03:09 PM    MCV 86.0 12/15/2018 03:09 PM      Lab Results   Component Value Date/Time    Sodium 137 12/15/2018 03:09 PM    Potassium 3.8 12/15/2018 03:09 PM    Chloride 103 12/15/2018 03:09 PM    CO2 27 12/15/2018 03:09 PM    Anion gap 7 12/15/2018 03:09 PM    Glucose 94 12/15/2018 03:09 PM    BUN 8 12/15/2018 03:09 PM    Creatinine 0.80 12/15/2018 03:09 PM    BUN/Creatinine ratio 10 (L) 12/15/2018 03:09 PM    GFR est AA >60 12/15/2018 03:09 PM    GFR est non-AA >60 12/15/2018 03:09 PM    Calcium 8.6 12/15/2018 03:09 PM    Bilirubin, total 0.3 12/15/2018 03:09 PM    AST (SGOT) 20 12/15/2018 03:09 PM    Alk. phosphatase 72 12/15/2018 03:09 PM    Protein, total 8.1 12/15/2018 03:09 PM    Albumin 3.4 (L) 12/15/2018 03:09 PM    Globulin 4.7 (H) 12/15/2018 03:09 PM    A-G Ratio 0.7 (L) 12/15/2018 03:09 PM    ALT (SGPT) 33 12/15/2018 03:09 PM           ASSESSMENT      1. Palpitations   2. Obstructive sleep apnea  3. Tracheostomy    4. Obesity       PLAN     Continue current drug regimen. FOLLOW-UP     6 months with Harley Adamson      Thank you, Saad Rey MD and Dr. Raghav Cantu for allowing me to participate in the care of this extraordinarily pleasant female. Please do not hesitate to contact me for further questions/concerns. Jenna Marquez MD  Cardiac Electrophysiology / Cardiology    [facility]  [address] [facility], [facility], [address]  [place] [place]    [place]  [phone] / [phone] Fax   [phone] / [phone] Fax

## 2019-03-04 NOTE — PROGRESS NOTES
Room # 5    Recently had the flu. Seen at [facility] and [place]. Seen at [facility] for coughing up blood from trach.     C/o palpitations and elevated HR during the flu episodes    Visit Vitals  /84 (BP 1 Location: Left arm, BP Patient Position: Sitting)   Pulse 72   Resp 20   Ht 5' 7\" (1.702 m)   Wt (!) 417 lb 3.2 oz (189.2 kg)   LMP  (LMP Unknown)   SpO2 97%   BMI 65.34 kg/m²

## 2019-03-04 NOTE — PATIENT INSTRUCTIONS
Endometrial Biopsy: About This Test 
What is it? An endometrial biopsy is a way for your doctor to take a small sample of the lining of the uterus (endometrium). The sample is looked at under a microscope for abnormal cells. An endometrial biopsy helps your doctor find problems in the endometrium. Why is this test done? An endometrial biopsy is done to check for cancer of the uterus. The test is also done if you have abnormal bleeding from your uterus or are having problems getting pregnant. The test results show how your body's hormones are affecting the lining of the uterus. How can you prepare for the test? 
Talk to your doctor about all your health conditions before the test. For example, tell your doctor if you: · Are or might be pregnant. An endometrial biopsy is not done during pregnancy. · Are taking any medicines. · Are allergic to any medicines. · Have had bleeding problems, or if you take aspirin or some other blood thinner. · Have been treated for an infection in your pelvic area. · Have any heart or lung problems. Other ways to prepare: · Do not douche, use tampons, or use vaginal medicines for 24 hours before the test. 
· Ask your doctor if you should take a pain reliever, such as ibuprofen (Advil or Motrin), 30 to 60 minutes before the test. This can help reduce any cramping pain that the test can cause. · Talk to your doctor if you have concerns about the need for the test, its risks, how it will be done, or what the results may mean. What happens before the test? 
· You will empty your bladder just before the test. 
· You will be asked to sign a consent form that says you understand the risks of the test and agree to have it done. What happens during the test? 
· You will lie on an exam table. Your feet will be in stirrups. · The doctor may use a spray or injection to numb your cervix. The cervix is the opening to the uterus. · The doctor will use a tool called a speculum to see the cervix. · Then the doctor will pass a thin tube through the cervix to take a sample of the uterus lining. You may feel a sharp cramp when the doctor collects the sample. · The sample is sent to a lab. How long does the test take? The test will take about 5 to 15 minutes. What happens after the test? 
· You will probably be able to go home right away. · You likely will have mild vaginal bleeding and may have cramps for a few days after the test. The cramps may feel like bad menstrual cramps. · Ask your doctor if you can take an over-the-counter pain medicine, such as acetaminophen (Tylenol), ibuprofen (Advil, Motrin), or naproxen (Aleve). Be safe with medicines. Read and follow all instructions on the label. · Do not have sex, use tampons, or douche until the spotting stops. Use pads for vaginal bleeding or discharge. · Do not do strenuous exercise or heavy lifting for one day after your biopsy. Follow-up care is a key part of your treatment and safety. Be sure to make and go to all appointments, and call your doctor if you are having problems. It's also a good idea to keep a list of the medicines you take. Ask your doctor when you can expect to have your test results. Where can you learn more? Go to http://ethan-anupam.info/. Enter [phone] in the search box to learn more about \"Endometrial Biopsy: About This Test.\" Current as of: May 14, 2018 Content Version: 11.9 © 2435-3174 [employer]. Care instructions adapted under license by [employer] (which disclaims liability or warranty for this information). If you have questions about a medical condition or this instruction, always ask your healthcare professional. [address] any warranty or liability for your use of this information.

## 2019-03-05 ENCOUNTER — OFFICE VISIT (OUTPATIENT)
Dept: OBGYN CLINIC | Age: 39
End: 2019-03-05

## 2019-03-05 ENCOUNTER — DOCUMENTATION ONLY (OUTPATIENT)
Dept: OBGYN CLINIC | Age: 39
End: 2019-03-05

## 2019-03-19 ENCOUNTER — OFFICE VISIT (OUTPATIENT)
Dept: OBGYN CLINIC | Age: 39
End: 2019-03-19

## 2019-03-19 ENCOUNTER — DOCUMENTATION ONLY (OUTPATIENT)
Dept: OBGYN CLINIC | Age: 39
End: 2019-03-19

## 2019-04-01 ENCOUNTER — OFFICE VISIT (OUTPATIENT)
Dept: FAMILY MEDICINE CLINIC | Age: 39
End: 2019-04-01

## 2019-04-01 VITALS
BODY MASS INDEX: 45.99 KG/M2 | SYSTOLIC BLOOD PRESSURE: 146 MMHG | RESPIRATION RATE: 20 BRPM | DIASTOLIC BLOOD PRESSURE: 76 MMHG | HEART RATE: 73 BPM | TEMPERATURE: 98.9 F | HEIGHT: 67 IN | WEIGHT: 293 LBS

## 2019-04-01 DIAGNOSIS — E66.01 MORBID OBESITY (HCC): ICD-10-CM

## 2019-04-01 DIAGNOSIS — Z93.0 TRACHEOSTOMY PRESENT (HCC): ICD-10-CM

## 2019-04-01 DIAGNOSIS — K64.4 EXTERNAL HEMORRHOID, BLEEDING: Primary | ICD-10-CM

## 2019-04-01 PROBLEM — D69.6 THROMBOCYTOPENIA (HCC): Status: RESOLVED | Noted: 2018-07-10 | Resolved: 2019-04-01

## 2019-04-01 RX ORDER — DICYCLOMINE HYDROCHLORIDE 10 MG/1
10 CAPSULE ORAL 3 TIMES DAILY
Qty: 90 CAP | Refills: 0 | Status: SHIPPED | OUTPATIENT
Start: 2019-04-01 | End: 2019-04-12

## 2019-04-01 NOTE — PROGRESS NOTES
Family Medicine Acute Visit Progress Note Patient: Grecia Fontenot 1980, 41 y.o., female Encounter Date: 4/1/2019 ASSESSMENT & PLAN 
  ICD-10-CM ICD-9-CM 1. External hemorrhoid, bleeding K64.4 455.5 2. Morbid obesity ([facility] [address] E66.01 278.01   
3. Tracheostomy present ([facility] [address] Z93.0 V44.0 Orders Placed This Encounter  dicyclomine (BENTYL) 10 mg capsule Sig: Take 1 Cap by mouth three (3) times daily. Dispense:  90 Cap Refill:  0 Patient Instructions I agree that the patient can follow-up with colorectal surgery. I did discuss with restarting a bland diet and slowly upgrading it as tolerated. I encouraged her to keep her stools soft so that she does not have any irritation, I encouraged her to drink plenty of fluids. Her blood counts were stable I did encourage her that weight loss might help to decrease her portal pressures in the future and decrease her risk of hemorrhoids overall Tracheostomy is capped today at the visit, patient is otherwise doing well and will keep all future follow-up appointments Needs to schedule Medicare annual wellness visit CHIEF COMPLAINT Chief Complaint Patient presents with  
[facility] Follow Up SUBJECTIVE Grecia Fontenot is a 41 y.o. female presenting today for hospital follow-up. She was seen in the emergency room on 3/31/2019 in the evening for bleeding per rectum. She was found to have hemorrhoids, external, that was bleeding and was prescribed stool softeners, Proctofoam on, and recommended to follow-up. She was given the discharge information for colorectal surgery to be seen in follow-up there. While in the ER her pulse was normal, her respirations were normal, her temp was normal, her blood pressure was 163/109 and her pulse ox was stable Labs reviewed. CMP essentially normal, CBC essentially normal as well except that platelets were found to be 404. Hemoglobin was stable at 11.4 Patient reports yesterday she was getting ready for [employer] and started having cramping and diarrhea. She was worried that she had food poisoning because 3-5 days prior she cooked some chicken in the air frier and the chicken wasn't totally done. She also was tasting cupcake batter recently. She reports she saw bloody stool last night and went to [facility] after an episode of grossly bloody bowel movement. Review of Systems + abdominal cramping 
+ diarrhea 
+rectal bleeding (now stopped) OBJECTIVE Visit Vitals /76 (BP 1 Location: Left arm, BP Patient Position: Sitting) Pulse 73 Temp 98.9 °F (37.2 °C) (Oral) Resp 20 Ht 5' 7\" (1.702 m) Wt (!) 417 lb (189.1 kg) LMP 03/20/2019 (Exact Date) BMI 65.31 kg/m² Physical Exam  
Constitutional: She is oriented to person, place, and time. Morbidly obese, no acute distress and nontoxic, tracheostomy capped and in place HENT:  
Head: Normocephalic and atraumatic. Mouth/Throat: Oropharynx is clear and moist.  
Eyes: Pupils are equal, round, and reactive to light. Conjunctivae and EOM are normal.  
Exophthalmos Cardiovascular: Normal rate, regular rhythm and normal heart sounds. Pulmonary/Chest: Effort normal. No respiratory distress. Breath sounds distant due to body habitus Abdominal: Soft. She exhibits no distension. Large pannus, hyperactive bowel sounds Genitourinary:  
Genitourinary Comments: Rectal exam is deferred as it was done last night and documented that the patient had a bleeding external hemorrhoid Neurological: She is alert and oriented to person, place, and time. Skin: Skin is warm and dry. Hyperpigmentation around tracheostomy Psychiatric: She has a normal mood and affect. Her behavior is normal.  
 
 
No results found for any visits on 04/01/19. HISTORICAL 
PMH, PSH, FHX, SOCHX, ALLERGIES and MES were reviewed and updated today. Tom Craig MD 
[address] 04/01/19 3:52 PM 
 
Portions of this note may have been populated using smart dictation software and may have \"sounds-like\" errors present. Pt was counseled on risks, benefits and alternatives of treatment options. All questions were asked and answered and the patient was agreeable with the treatment plan as outlined.

## 2019-04-01 NOTE — PATIENT INSTRUCTIONS
I agree that the patient can follow-up with colorectal surgery. I did discuss with restarting a bland diet and slowly upgrading it as tolerated. I encouraged her to keep her stools soft so that she does not have any irritation, I encouraged her to drink plenty of fluids. Her blood counts were stable I did encourage her that weight loss might help to decrease her portal pressures in the future and decrease her risk of hemorrhoids overall Tracheostomy is capped today at the visit, patient is otherwise doing well and will keep all future follow-up appointments Needs to schedule Medicare annual wellness visit

## 2019-04-01 NOTE — PROGRESS NOTES
Chief Complaint Patient presents with  
[facility] Follow Up 1. Have you been to the ER, urgent care clinic since your last visit? Hospitalized since your last visit? [facility] ER, abdominal pain and diarrhea with blood. 2. Have you seen or consulted any other health care providers outside of the [address] [place] [address] since your last visit? Include any pap smears or colon screening.  No

## 2019-04-12 ENCOUNTER — OFFICE VISIT (OUTPATIENT)
Dept: FAMILY MEDICINE CLINIC | Age: 39
End: 2019-04-12

## 2019-04-12 VITALS
SYSTOLIC BLOOD PRESSURE: 131 MMHG | BODY MASS INDEX: 45.99 KG/M2 | HEIGHT: 67 IN | OXYGEN SATURATION: 95 % | TEMPERATURE: 98.8 F | DIASTOLIC BLOOD PRESSURE: 79 MMHG | RESPIRATION RATE: 18 BRPM | HEART RATE: 84 BPM | WEIGHT: 293 LBS

## 2019-04-12 DIAGNOSIS — B97.89 VIRAL RESPIRATORY ILLNESS: Primary | ICD-10-CM

## 2019-04-12 DIAGNOSIS — J98.8 VIRAL RESPIRATORY ILLNESS: Primary | ICD-10-CM

## 2019-04-12 NOTE — PROGRESS NOTES
HISTORY OF PRESENT ILLNESS  Robby Garcia is a 41 y.o. female. Robby Garcia presents with a cough, productive of clear to yellow sputum, for the past 5 days. She is getting worse. Other symptoms include a fever to 101.3, although she has been afebrile for the past 2 days, head congestion, throat swelling, bilateral ear pain and shortness of breath once. Motrin helps some, Tussin DM helps some. Nothing is making her worse. Review of Systems   Constitutional: Positive for malaise/fatigue. Negative for chills and fever. HENT: Positive for congestion, ear pain and sore throat. Mucous is yellow in color. There is no sinus pain. Eyes: Positive for discharge. Negative for redness. Yellow to white eye discharge this am   Respiratory: Positive for cough, sputum production and shortness of breath. Negative for hemoptysis and wheezing. Cardiovascular: Negative for chest pain. Musculoskeletal: Negative for myalgias. Neurological: Negative for headaches. Visit Vitals  /79   Pulse 84   Temp 98.8 °F (37.1 °C) (Oral)   Resp 18   Ht 5' 7\" (1.702 m)   Wt (!) 417 lb (189.1 kg)   LMP 03/20/2019 (Exact Date)   SpO2 95%   BMI 65.31 kg/m²     Physical Exam   Constitutional: She is oriented to person, place, and time. She appears well-developed and well-nourished. No distress. HENT:   Head: Normocephalic. Right Ear: Tympanic membrane, external ear and ear canal normal.   Left Ear: Tympanic membrane, external ear and ear canal normal.   Nose: Nose normal. Right sinus exhibits no maxillary sinus tenderness and no frontal sinus tenderness. Left sinus exhibits no maxillary sinus tenderness and no frontal sinus tenderness. Mouth/Throat: Uvula is midline, oropharynx is clear and moist and mucous membranes are normal.   Eyes: Right eye exhibits no discharge. Left eye exhibits no discharge. Right conjunctiva is not injected. Left conjunctiva is not injected.    Neck:   Trach present Cardiovascular: Normal rate, regular rhythm and normal heart sounds. Exam reveals no gallop and no friction rub. No murmur heard. Pulmonary/Chest: Effort normal and breath sounds normal. No respiratory distress. She has no wheezes. She has no rales. Lymphadenopathy:     She has no cervical adenopathy. Neurological: She is alert and oriented to person, place, and time. Skin: Skin is warm and dry. She is not diaphoretic. Nursing note and vitals reviewed. ASSESSMENT and PLAN    ICD-10-CM ICD-9-CM    1. Viral respiratory illness J98.8 079.99 DISCONTINUED: chlorpheniramine-dm (CORICIDIN HBP COUGH AND COLD) 4-30 mg tab    B97.89          Rest, push fluids    Follow-up and Dispositions    · Return if not better in 5-7 days. Reviewed plan of care. Patient has provided input and agrees with goals.

## 2019-04-12 NOTE — PROGRESS NOTES
Chief Complaint   Patient presents with    Cough     sob   x 5 days    1. Have you been to the ER, urgent care clinic since your last visit? Hospitalized since your last visit? No     2. Have you seen or consulted any other health care providers outside of the [address] [place] [address] since your last visit? Include any pap smears or colon screening.  No

## 2019-04-23 RX ORDER — DICYCLOMINE HYDROCHLORIDE 10 MG/1
CAPSULE ORAL
Qty: 90 CAP | Refills: 0 | Status: SHIPPED | OUTPATIENT
Start: 2019-04-23 | End: 2019-04-24

## 2019-04-24 ENCOUNTER — OFFICE VISIT (OUTPATIENT)
Dept: CARDIOLOGY CLINIC | Age: 39
End: 2019-04-24

## 2019-04-24 VITALS
OXYGEN SATURATION: 96 % | RESPIRATION RATE: 20 BRPM | BODY MASS INDEX: 45.99 KG/M2 | HEART RATE: 84 BPM | WEIGHT: 293 LBS | SYSTOLIC BLOOD PRESSURE: 128 MMHG | HEIGHT: 67 IN | DIASTOLIC BLOOD PRESSURE: 80 MMHG

## 2019-04-24 DIAGNOSIS — R55 POSTURAL DIZZINESS WITH PRESYNCOPE: ICD-10-CM

## 2019-04-24 DIAGNOSIS — Z01.810 PREOP CARDIOVASCULAR EXAM: ICD-10-CM

## 2019-04-24 DIAGNOSIS — R42 POSTURAL DIZZINESS WITH PRESYNCOPE: ICD-10-CM

## 2019-04-24 DIAGNOSIS — R06.02 SOB (SHORTNESS OF BREATH): Primary | ICD-10-CM

## 2019-04-24 NOTE — PROGRESS NOTES
Room # 3    Episode the other morning woke up felt dizzy and SOB called 911    Palpitations, upper chest discomfort, SOB    Visit Vitals  /80 (BP 1 Location: Left arm, BP Patient Position: Sitting)   Pulse 84   Resp 20   Ht 5' 7\" (1.702 m)   Wt (!) 417 lb (189.1 kg)   LMP 03/20/2019 (Exact Date)   SpO2 96%   BMI 65.31 kg/m²

## 2019-04-24 NOTE — LETTER
4/24/2019 12:20 PM 
 
Ms. Brian Rosas [address] [id number] You are scheduled for an implantable cardiac monitor at [facility] on Monday, May 20th. Please arrive at the patient registration desk located on the 2nd floor of the main building at 11:00am. 
 
Do not eat or drink anything after midnight the night before your procedure. You will need a . You may take your normal medications with a sip of water the morning of your procedure. Hold any blood glucose lowering medications the day of your procedure (glipizide,metformin,etc). Lab instructions: Please have labs drawn 3-7 days prior to scheduled procedure at any [address] [place] [address] location. Fasting is not required. If not done at a [facility] location, have labs drawn 5-7 days prior. Have results faxed to [phone]. Please call [facility] if you have any questions at [phone]. Please call the office if you develop any type of illness prior to your procedure. Sincerely, Marivel Moore MD/Gopi Ott

## 2019-04-24 NOTE — PROGRESS NOTES
HISTORY OF PRESENTING ILLNESS      Joe Osorio is a 41 y.o. female with MATEUSZ, tracheostomy, obesity who has experienced recurrent highly symptomatic palpitations which had been challenging to document despite monitoring and presentation to ER. She underwent 30 day monitor which demonstrated sinus rhythm as well as long RP tachycardias with rates in the 150's. She reported numerous episodes of chest pain/pressure/palpitations during which her heart rate was normal. She also reported palpitations during episodes of long RP tachycardias. She reported feeling poorly with metoprolol 25 mg BID in that she felt chest pain and felt that her heart rate was too slow. Her dose was lowered to 12.5 mg BID subsequently. She noted her palpitations awakened her from sleep occasionally however overall her palpitation burden was improving and that her chest pain and pressure has resolved.  She continued to have occasional episodes of tachycardia that would last up to 5 minutes.  She reported approximately 2 episodes per month. Last visit we discussed the option of adding diltiazem 60 mg twice daily to her current regimen however she opted to avoid additional medications and continued her current dose of metoprolol with plan to administer additional doses of metoprolol as needed for occasional episodes of tachycardia. She now presents for follow-up. She was recently hospitalized for a viral syndrome during which she reports her heart rate escalated to the 120s with an increase in palpitations. Her heart rate subsequently improved and lowered down into the 60s-70s at times. She is recovering from her viral illness now and reports that prior to her hospitalization she had adequate control of her palpitations.        ACTIVE PROBLEM LIST     Patient Active Problem List    Diagnosis Date Noted    Palpitations 07/13/2018    IFG (impaired fasting glucose) 07/10/2018    Depression, major, single episode, severe ([facility] [address] 2017    Former heavy tobacco smoker 2016    MEGHAN (generalized anxiety disorder) 2016    Prediabetes 2016    Morbid obesity ([facility] [address] 2016    MATEUSZ on CPAP 2016    Tracheostomy present ([facility] [address] 2016           PAST MEDICAL HISTORY     Past Medical History:   Diagnosis Date    Depression, major, single episode, severe ([address] 2017    Morbid obesity ([facility] [address] 2016    Racing heart beat     Routine Papanicolaou smear 2019    Negative ; HPV Negative     Sleep apnea     Tracheostomy dependence ([facility] [address]     for severe sleep apnea            PAST SURGICAL HISTORY     Past Surgical History:   Procedure Laterality Date    HX GYN      removal of benign ovarian cyst - per pt done in OR, under anesthesia, but states had no incisions    HX TRACHEOSTOMY  13    Due to sleep apnea          ALLERGIES     Allergies   Allergen Reactions    Sulfa (Sulfonamide Antibiotics) Rash          FAMILY HISTORY     Family History   Problem Relation Age of Onset    Diabetes Mother     Hypertension Mother     Heart Disease Maternal Grandmother     No Known Problems Father     Breast Cancer Maternal Aunt         Great Aunt - ?breast ?bone    Cancer Other         great aunt - not sure what type of cancer    negative for cardiac disease       SOCIAL HISTORY     Social History     Socioeconomic History    Marital status: SINGLE     Spouse name: Not on file    Number of children: Not on file    Years of education: Not on file    Highest education level: Not on file   Tobacco Use    Smoking status: Former Smoker     Packs/day: 2.00     Years: 10.00     Pack years: 20.00     Last attempt to quit:      Years since quittin.3    Smokeless tobacco: Never Used   Substance and Sexual Activity    Alcohol use: No     Alcohol/week: 0.0 oz    Drug use: No     Comment: Smoked Marijuana in 0439-7923, but not current user. -vr    Sexual activity: Not Currently         MEDICATIONS Current Outpatient Medications   Medication Sig    dicyclomine (BENTYL) 10 mg capsule TAKE 1 CAPSULE BY MOUTH THREE TIMES A DAY    chlorpheniramine-dm (CORICIDIN HBP COUGH AND COLD) 4-30 mg tab Take  by mouth.  metoprolol tartrate (LOPRESSOR) 25 mg tablet TAKE 1 TABLET BY MOUTH TWICE A DAY    hydroCHLOROthiazide (MICROZIDE) 12.5 mg capsule Take 12.5 mg by mouth daily.  aspirin (ASPIRIN) 325 mg tablet Take 325 mg by mouth daily.  multivit,calc,mins/iron/folic (ONE-A-DAY WOMENS FORMULA PO) Take 1 Tab by mouth daily. No current facility-administered medications for this visit. I have reviewed the nurses notes, vitals, problem list, allergy list, medical history, family, social history and medications. REVIEW OF SYMPTOMS      General: Pt denies excessive weight gain or loss. Pt is able to conduct ADL's  HEENT: Denies blurred vision, headaches, hearing loss, epistaxis and difficulty swallowing. Respiratory: Denies cough, congestion, shortness of breath, PIZANO, wheezing or stridor. Cardiovascular: Denies precordial pain, palpitations, edema or PND  Gastrointestinal: Denies poor appetite, indigestion, abdominal pain or blood in stool  Genitourinary: Denies hematuria, dysuria, increased urinary frequency  Musculoskeletal: Denies joint pain or swelling from muscles or joints  Neurologic: Denies tremor, paresthesias, headache, or sensory motor disturbance  Psychiatric: Denies confusion, insomnia, depression  Integumentray: Denies rash, itching or ulcers. Hematologic: Denies easy bruising, bleeding       PHYSICAL EXAMINATION      There were no vitals filed for this visit. General: Well developed, in no acute distress. HEENT: No jaundice, oral mucosa moist, no oral ulcers  Neck: Supple, no stiffness, no lymphadenopathy, supple  Heart:  Normal S1/S2 negative S3 or S4.  Regular, no murmur, gallop or rub, no jugular venous distention  Respiratory: Clear bilaterally x 4, no wheezing or rales  Abdomen:   Soft, non-tender, bowel sounds are active.   Extremities:  No edema, normal cap refill, no cyanosis. Musculoskeletal: No clubbing, no deformities  Neuro: A&Ox3, speech clear, gait stable, cooperative, no focal neurologic deficits  Skin: Skin color is normal. No rashes or lesions. Non diaphoretic, moist.  Vascular: 2+ pulses symmetric in all extremities       DIAGNOSTIC DATA      EKG:        LABORATORY DATA      Lab Results   Component Value Date/Time    WBC 7.4 12/15/2018 03:09 PM    Hemoglobin (POC) 12.6 04/03/2016 06:46 AM    HGB 11.7 12/15/2018 03:09 PM    Hematocrit (POC) 37 04/03/2016 06:46 AM    HCT 36.8 12/15/2018 03:09 PM    PLATELET 780 58/74/2791 03:09 PM    MCV 86.0 12/15/2018 03:09 PM      Lab Results   Component Value Date/Time    Sodium 137 12/15/2018 03:09 PM    Potassium 3.8 12/15/2018 03:09 PM    Chloride 103 12/15/2018 03:09 PM    CO2 27 12/15/2018 03:09 PM    Anion gap 7 12/15/2018 03:09 PM    Glucose 94 12/15/2018 03:09 PM    BUN 8 12/15/2018 03:09 PM    Creatinine 0.80 12/15/2018 03:09 PM    BUN/Creatinine ratio 10 (L) 12/15/2018 03:09 PM    GFR est AA >60 12/15/2018 03:09 PM    GFR est non-AA >60 12/15/2018 03:09 PM    Calcium 8.6 12/15/2018 03:09 PM    Bilirubin, total 0.3 12/15/2018 03:09 PM    AST (SGOT) 20 12/15/2018 03:09 PM    Alk. phosphatase 72 12/15/2018 03:09 PM    Protein, total 8.1 12/15/2018 03:09 PM    Albumin 3.4 (L) 12/15/2018 03:09 PM    Globulin 4.7 (H) 12/15/2018 03:09 PM    A-G Ratio 0.7 (L) 12/15/2018 03:09 PM    ALT (SGPT) 33 12/15/2018 03:09 PM           ASSESSMENT      1. Palpitations   2. Obstructive sleep apnea  3. Tracheostomy    4. Obesity       PLAN          FOLLOW-UP       Thank you, Maryam Heath MD and Dr. Kelsey Castillo for allowing me to participate in the care of this extraordinarily pleasant female. Please do not hesitate to contact me for further questions/concerns.          Matt Sears MD  Cardiac Electrophysiology / Cardiology    [facility] [address], [facility], [address], [address]    Latesha Newton  [phone] / [phone] Fax   [phone] / [phone] Fax

## 2019-05-16 RX ORDER — SODIUM CHLORIDE 0.9 % (FLUSH) 0.9 %
5-40 SYRINGE (ML) INJECTION AS NEEDED
Status: CANCELLED | OUTPATIENT
Start: 2019-05-16

## 2019-05-16 RX ORDER — SODIUM CHLORIDE 0.9 % (FLUSH) 0.9 %
5-40 SYRINGE (ML) INJECTION EVERY 8 HOURS
Status: CANCELLED | OUTPATIENT
Start: 2019-05-16

## 2019-05-20 ENCOUNTER — TELEPHONE (OUTPATIENT)
Dept: CARDIOLOGY CLINIC | Age: 39
End: 2019-05-20

## 2019-05-20 NOTE — TELEPHONE ENCOUNTER
Received call from patient, ID verified using two patient identifier. She states she needs to reschedule her Implantable Loop procedure until after 6/3/19. [facility] cath lab and Dr. Jennifer Jerome notified.

## 2019-06-06 ENCOUNTER — TELEPHONE (OUTPATIENT)
Dept: CARDIOLOGY CLINIC | Age: 39
End: 2019-06-06

## 2019-06-06 NOTE — TELEPHONE ENCOUNTER
Patient is calling to see when she is supposed to come in for an implantable devise. Please advise.     Phone #: [phone]  Thanks

## 2019-06-10 NOTE — TELEPHONE ENCOUNTER
Spoke with patient. Will discuss arrival time availability with her mother for Mondays or Thursdays as she is not able to arrive before 11am.  Will call back tomorrow with availability with options.

## 2019-06-25 ENCOUNTER — OFFICE VISIT (OUTPATIENT)
Dept: FAMILY MEDICINE CLINIC | Age: 39
End: 2019-06-25

## 2019-06-25 VITALS
BODY MASS INDEX: 45.99 KG/M2 | RESPIRATION RATE: 18 BRPM | WEIGHT: 293 LBS | HEIGHT: 67 IN | SYSTOLIC BLOOD PRESSURE: 143 MMHG | TEMPERATURE: 98.4 F | DIASTOLIC BLOOD PRESSURE: 93 MMHG | HEART RATE: 88 BPM

## 2019-06-25 DIAGNOSIS — R30.9 PAINFUL URINATION: ICD-10-CM

## 2019-06-25 DIAGNOSIS — R35.0 FREQUENCY OF URINATION: Primary | ICD-10-CM

## 2019-06-25 LAB
BILIRUB UR QL STRIP: NEGATIVE
GLUCOSE UR-MCNC: NEGATIVE MG/DL
KETONES P FAST UR STRIP-MCNC: NEGATIVE MG/DL
PH UR STRIP: 6 [PH] (ref 4.6–8)
PROT UR QL STRIP: NORMAL
SP GR UR STRIP: 1.02 (ref 1–1.03)
UA UROBILINOGEN AMB POC: NORMAL (ref 0.2–1)
URINALYSIS CLARITY POC: CLEAR
URINALYSIS COLOR POC: YELLOW
URINE BLOOD POC: NORMAL
URINE LEUKOCYTES POC: NORMAL
URINE NITRITES POC: POSITIVE

## 2019-06-25 RX ORDER — CEPHALEXIN 500 MG/1
500 CAPSULE ORAL 2 TIMES DAILY
Qty: 14 CAP | Refills: 0 | Status: SHIPPED | OUTPATIENT
Start: 2019-06-25 | End: 2019-07-02

## 2019-06-25 NOTE — PROGRESS NOTES
Subjective:     Clayton Low is a 46 y.o. female who complains of dysuria, frequency, urgency, abnormal smelling urine for 6 days. Patient denies flank pain, vomiting, fever, unusual vaginal discharge, unusual vaginal bleeding, urethral discharge. Patient does not have a history of recurrent UTI. Patient does not have a history of pyelonephritis. Patient Active Problem List   Diagnosis Code    Morbid obesity ([facility] E66.01    MATEUSZ on CPAP G47.33, Z99.89    Tracheostomy present ([facility] Z93.0    Former heavy tobacco smoker Z87.891    MEGHAN (generalized anxiety disorder) F41.1    Prediabetes R73.03    Depression, major, single episode, severe ([facility] F32.2    IFG (impaired fasting glucose) R73.01    Palpitations R00.2     Current Outpatient Medications   Medication Sig Dispense Refill    omega 3-dha-epa-fish oil (FISH OIL) 100-160-1,000 mg cap Take  by mouth.  metoprolol tartrate (LOPRESSOR) 25 mg tablet TAKE 1 TABLET BY MOUTH TWICE A  Tab 3    hydroCHLOROthiazide (MICROZIDE) 12.5 mg capsule Take 12.5 mg by mouth daily.  aspirin (ASPIRIN) 325 mg tablet Take 325 mg by mouth daily.  multivit,calc,mins/iron/folic (ONE-A-DAY WOMENS FORMULA PO) Take 1 Tab by mouth daily.        Allergies   Allergen Reactions    Sulfa (Sulfonamide Antibiotics) Rash     Past Medical History:   Diagnosis Date    Depression, major, single episode, severe ([facility] 11/16/2017    Morbid obesity ([facility] 4/18/2016    Racing heart beat     Routine Papanicolaou smear 02/13/2019    Negative ; HPV Negative     Sleep apnea     Tracheostomy dependence ([facility]     for severe sleep apnea      Past Surgical History:   Procedure Laterality Date    HX GYN  2002    removal of benign ovarian cyst - per pt done in OR, under anesthesia, but states had no incisions    HX TRACHEOSTOMY  8/26/13    Due to sleep apnea     Family History   Problem Relation Age of Onset    Diabetes Mother     Hypertension Mother     Heart Disease Maternal Grandmother     No Known Problems Father     Breast Cancer Maternal Aunt         Great Aunt - ?breast ?bone    Cancer Other         great aunt - not sure what type of cancer     Social History     Tobacco Use    Smoking status: Former Smoker     Packs/day: 2.00     Years: 10.00     Pack years: 20.00     Last attempt to quit: 2013     Years since quittin.4    Smokeless tobacco: Never Used   Substance Use Topics    Alcohol use: No     Alcohol/week: 0.0 oz        Review of Systems   A comprehensive review of systems was negative except for that written in the HPI. Objective:     Visit Vitals  BP (!) 143/93 (BP 1 Location: Left arm, BP Patient Position: Sitting)   Pulse 88   Temp 98.4 °F (36.9 °C) (Oral)   Resp 18   Ht 5' 7\" (1.702 m)   Wt (!) 417 lb (189.1 kg)   LMP 2019   Breastfeeding? Unknown   BMI 65.31 kg/m²     General:  alert, cooperative, no distress, morbidly obese   Abdomen: Morbidly obese, large pannus, + bs, nontender. Back:  CVA tenderness absent   :  defer exam     Laboratory:   Urine dipstick shows   Results for orders placed or performed in visit on 19   AMB POC URINALYSIS DIP STICK AUTO W/O MICRO     Status: None   Result Value Ref Range Status    Color (UA POC) Yellow  Final    Clarity (UA POC) Clear  Final    Glucose (UA POC) Negative Negative Final    Bilirubin (UA POC) Negative Negative Final    Ketones (UA POC) Negative Negative Final    Specific gravity (UA POC) 1.025 1.001 - 1.035 Final    Blood (UA POC) 3+ Negative Final    pH (UA POC) 6.0 4.6 - 8.0 Final    Protein (UA POC) 1+ Negative Final    Urobilinogen (UA POC) 0.2 mg/dL 0.2 - 1 Final    Nitrites (UA POC) Positive Negative Final    Leukocyte esterase (UA POC) 2+ Negative Final       Assessment/Plan:     Acute cystitis     1. keflex as ordered  2. Maintain adequate hydration  3. May use OTC pyridium as desired, which will turn urine orange/red color  4.  Follow up if symptoms not improving, and prn.      ICD-10-CM ICD-9-CM    1. Frequency of urination R35.0 788.41 AMB POC URINALYSIS DIP STICK AUTO W/O MICRO      UA/M W/RFLX CULTURE, COMP   2. Painful urination R30.9 788.1 AMB POC URINALYSIS DIP STICK AUTO W/O MICRO      UA/M W/RFLX CULTURE, COMP   .

## 2019-06-25 NOTE — PROGRESS NOTES
Chief Complaint   Patient presents with    Urinary Frequency     Pain and pressure x 1 week. 1. Have you been to the ER, urgent care clinic since your last visit? Hospitalized since your last visit? No    2. Have you seen or consulted any other health care providers outside of the [address] [place] [address] since your last visit? Include any pap smears or colon screening.  No

## 2019-06-28 LAB
APPEARANCE UR: ABNORMAL
BACTERIA #/AREA URNS HPF: ABNORMAL /[HPF]
BACTERIA UR CULT: ABNORMAL
BILIRUB UR QL STRIP: NEGATIVE
CASTS URNS QL MICRO: ABNORMAL /LPF
COLOR UR: YELLOW
CRYSTALS URNS MICRO: ABNORMAL
EPI CELLS #/AREA URNS HPF: >10 /HPF (ref 0–10)
GLUCOSE UR QL: NEGATIVE
HGB UR QL STRIP: ABNORMAL
KETONES UR QL STRIP: NEGATIVE
LEUKOCYTE ESTERASE UR QL STRIP: ABNORMAL
MICRO URNS: ABNORMAL
MUCOUS THREADS URNS QL MICRO: PRESENT
NITRITE UR QL STRIP: POSITIVE
PH UR STRIP: 5.5 [PH] (ref 5–7.5)
PROT UR QL STRIP: ABNORMAL
RBC #/AREA URNS HPF: >30 /HPF (ref 0–2)
SP GR UR: 1.02 (ref 1–1.03)
UNIDENT CRYS URNS QL MICRO: PRESENT
URINALYSIS REFLEX , 377201: ABNORMAL
UROBILINOGEN UR STRIP-MCNC: 0.2 MG/DL (ref 0.2–1)
WBC #/AREA URNS HPF: >30 /HPF (ref 0–5)

## 2019-07-02 ENCOUNTER — TELEPHONE (OUTPATIENT)
Dept: FAMILY MEDICINE CLINIC | Age: 39
End: 2019-07-02

## 2019-07-02 NOTE — TELEPHONE ENCOUNTER
----- Message from Carson Muller MD sent at 7/1/2019  4:59 PM EDT -----  + uti, medication prescribed is appropriate

## 2019-07-02 NOTE — TELEPHONE ENCOUNTER
Pt returned call, was advised of results showing urine culture, treated by Dr. Ciarra Scott with appropriate antibiotic. Pt states she's finished her antibiotics and is feeling better.  Danyell

## 2019-09-26 ENCOUNTER — TELEPHONE (OUTPATIENT)
Dept: CARDIOLOGY CLINIC | Age: 39
End: 2019-09-26

## 2019-09-26 NOTE — TELEPHONE ENCOUNTER
Patient stated that she would like to go through with the loop monitor and would like to discuss this further with you. Please advise.     Phone #: [phone]  Thanks

## 2019-09-26 NOTE — TELEPHONE ENCOUNTER
Spoke with patient. Will plan for loop monitor implant for Friday, October 11th.  11am arrival.  Understanding expressed.

## 2019-10-03 RX ORDER — CEPHALEXIN 250 MG/1
500 CAPSULE ORAL ONCE
Status: CANCELLED | OUTPATIENT
Start: 2019-10-03 | End: 2019-10-04

## 2019-10-03 RX ORDER — DIAZEPAM 5 MG/1
5 TABLET ORAL ONCE
Status: CANCELLED | OUTPATIENT
Start: 2019-10-11 | End: 2019-10-11

## 2019-10-14 ENCOUNTER — TELEPHONE (OUTPATIENT)
Dept: CARDIOLOGY CLINIC | Age: 39
End: 2019-10-14

## 2019-10-14 NOTE — TELEPHONE ENCOUNTER
Left VM for patient to come into office for visit. Recurrent scheduling and cancellation of ILR. Last seen in April. Message sent to BETO Hollingsworth for scheduling.

## 2020-04-07 ENCOUNTER — VIRTUAL VISIT (OUTPATIENT)
Dept: FAMILY MEDICINE CLINIC | Age: 40
End: 2020-04-07

## 2020-04-07 VITALS — HEIGHT: 67 IN | WEIGHT: 293 LBS | BODY MASS INDEX: 45.99 KG/M2

## 2020-04-07 DIAGNOSIS — R07.89 CHEST WALL PAIN: Primary | ICD-10-CM

## 2020-04-07 NOTE — PROGRESS NOTES
Carie Mahajan is a 46 y.o. female who was seen by synchronous (real-time) audio-video technology on 4/7/2020. Assessment & Plan:   Diagnoses and all orders for this visit:    1. Chest wall pain    Chest wall pain appears to be msk related rather than cardiac or pulmonary  Supportive care for now  Anticipatory guidance and return precautions and emergency precautions discussed and accepted    Fu prn          CPT Codes 43656-00141 for Established Patients may apply to this Telehealth Visit  Time-based coding, delete if not needed: I spent at least 10 minutes with this established patient, and >50% of the time was spent counseling and/or coordinating care regarding as noted in chart, ddx, med mgmt, conservative mgmt, treatment options, when to escalate care, safe supportive measures, etc    Subjective:   Carie Mahajan was seen for Chest Pain (chest wall, under breast)  patient reports to me that she woke up this morning and had some pain across her chest under her breast area, mostly on R side but if she bends over it makes it worse, and it does extend to the L side. She reports she went out today and wore an N95 mask. She does not feel lethargic or fatigued. She reports last night she was hungry but didn't eat anything. Her chest under her breast was painful. She can delineate that she thinks this is chest wall pain . She reports that it hurts when she bends over but not when she presses on it. She does notice pain when driving too but sitting right now nothing is wrong    Prior to Admission medications    Medication Sig Start Date End Date Taking? Authorizing Provider   metoprolol tartrate (LOPRESSOR) 25 mg tablet TAKE 1 TABLET BY MOUTH TWICE A DAY 2/13/19  Yes Corby Xiong MD   omega 3-dha-epa-fish oil (FISH OIL) 100-160-1,000 mg cap Take  by mouth. Provider, Historical   hydroCHLOROthiazide (MICROZIDE) 12.5 mg capsule Take 12.5 mg by mouth daily.   4/7/20  Provider, Historical   aspirin (ASPIRIN) 325 mg tablet Take 325 mg by mouth daily. Provider, Historical   multivit,calc,mins/iron/folic (ONE-A-DAY WOMENS FORMULA PO) Take 1 Tab by mouth daily. Provider, Historical     Allergies   Allergen Reactions    Sulfa (Sulfonamide Antibiotics) Rash       Past Medical History:   Diagnosis Date    Depression, major, single episode, severe ([facility] 2017    Morbid obesity ([facility] 2016    Racing heart beat     Routine Papanicolaou smear 2019    Negative ; HPV Negative     Sleep apnea     Tracheostomy dependence ([facility]     for severe sleep apnea      Past Surgical History:   Procedure Laterality Date    HX GYN      removal of benign ovarian cyst - per pt done in OR, under anesthesia, but states had no incisions    HX TRACHEOSTOMY  13    Due to sleep apnea     Family History   Problem Relation Age of Onset    Diabetes Mother     Hypertension Mother     Heart Disease Maternal Grandmother     No Known Problems Father     Breast Cancer Maternal Aunt         Great Aunt - ?breast ?bone    Cancer Other         great aunt - not sure what type of cancer     Social History     Tobacco Use    Smoking status: Former Smoker     Packs/day: 2.00     Years: 10.00     Pack years: 20.00     Last attempt to quit:      Years since quittin.2    Smokeless tobacco: Never Used   Substance Use Topics    Alcohol use: No     Alcohol/week: 0.0 standard drinks          ROS  A 12 point review of systems was negative except as noted here or in the HPI.       Objective:     General: alert, cooperative, no distress, trach in place, capped   Mental  status: mental status: alert, oriented to person, place, and time, normal mood, behavior, speech, dress, motor activity, and thought processes   Resp: resp: normal effort and no respiratory distress   Neuro: neuro: no gross deficits   Skin: skin: no discoloration or lesions of concern on visible areas     Due to this being a TeleHealth evaluation, many elements of the physical examination are unable to be assessed. We discussed the expected course, resolution and complications of the diagnosis(es) in detail. Medication risks, benefits, costs, interactions, and alternatives were discussed as indicated. I advised her to contact the office if her condition worsens, changes or fails to improve as anticipated. She expressed understanding with the diagnosis(es) and plan. Pursuant to the emergency declaration under the [address] [place] [address], [facility] waiver authority and the [employer] and [employer] Act, this Virtual  Visit was conducted, with patient's consent, to reduce the patient's risk of exposure to COVID-19 and provide continuity of care for an established patient. Services were provided through a video synchronous discussion virtually to substitute for in-person clinic visit.     Yasmany Garcia MD

## 2020-04-30 RX ORDER — METOPROLOL TARTRATE 25 MG/1
TABLET, FILM COATED ORAL
Qty: 30 TAB | Refills: 0 | Status: SHIPPED | OUTPATIENT
Start: 2020-04-30 | End: 2020-05-17

## 2020-04-30 NOTE — TELEPHONE ENCOUNTER
Please call patient and schedule them for a virtual visit about her BP. I have filled her prescription for 30 days, but cannot refill it anymore until she is seen. I will need to know her blood pressure at her visit.

## 2020-05-04 ENCOUNTER — VIRTUAL VISIT (OUTPATIENT)
Dept: FAMILY MEDICINE CLINIC | Age: 40
End: 2020-05-04

## 2020-05-04 VITALS — HEIGHT: 67 IN | WEIGHT: 293 LBS | BODY MASS INDEX: 45.99 KG/M2

## 2020-05-04 DIAGNOSIS — Z13.31 SCREENING FOR DEPRESSION: ICD-10-CM

## 2020-05-04 DIAGNOSIS — R03.0 ELEVATED BLOOD PRESSURE READING: ICD-10-CM

## 2020-05-04 DIAGNOSIS — R73.03 PREDIABETES: ICD-10-CM

## 2020-05-04 DIAGNOSIS — Z00.00 MEDICARE ANNUAL WELLNESS VISIT, SUBSEQUENT: Primary | ICD-10-CM

## 2020-05-04 DIAGNOSIS — Z13.39 SCREENING FOR ALCOHOLISM: ICD-10-CM

## 2020-05-04 NOTE — PATIENT INSTRUCTIONS
Medicare Wellness Visit, Female The best way to live healthy is to have a lifestyle where you eat a well-balanced diet, exercise regularly, limit alcohol use, and quit all forms of tobacco/nicotine, if applicable. Regular preventive services are another way to keep healthy. Preventive services (vaccines, screening tests, monitoring & exams) can help personalize your care plan, which helps you manage your own care. Screening tests can find health problems at the earliest stages, when they are easiest to treat. Иринаaakash follows the current, evidence-based guidelines published by the [facility] Shaka Najera ([facility]) when recommending preventive services for our patients. Because we follow these guidelines, sometimes recommendations change over time as research supports it. (For example, mammograms used to be recommended annually. Even though Medicare will still pay for an annual mammogram, the newer guidelines recommend a mammogram every two years for women of average risk). Of course, you and your doctor may decide to screen more often for some diseases, based on your risk and your co-morbidities (chronic disease you are already diagnosed with). Preventive services for you include: - Medicare offers their members a free annual wellness visit, which is time for you and your primary care provider to discuss and plan for your preventive service needs. Take advantage of this benefit every year! 
-All adults over the age of 72 should receive the recommended pneumonia vaccines. Current USPSTF guidelines recommend a series of two vaccines for the best pneumonia protection.  
-All adults should have a flu vaccine yearly and a tetanus vaccine every 10 years.  
-All adults age 48 and older should receive the shingles vaccines (series of two vaccines). -All adults age 38-68 who are overweight should have a diabetes screening test once every three years. -All adults born between [birth year] and 1964 should be screened once for Hepatitis C. 
-Other screening tests and preventive services for persons with diabetes include: an eye exam to screen for diabetic retinopathy, a kidney function test, a foot exam, and stricter control over your cholesterol.  
-Cardiovascular screening for adults with routine risk involves an electrocardiogram (ECG) at intervals determined by your doctor.  
-Colorectal cancer screenings should be done for adults age 54-65 with no increased risk factors for colorectal cancer. There are a number of acceptable methods of screening for this type of cancer. Each test has its own benefits and drawbacks. Discuss with your doctor what is most appropriate for you during your annual wellness visit. The different tests include: colonoscopy (considered the best screening method), a fecal occult blood test, a fecal DNA test, and sigmoidoscopy. 
 
-A bone mass density test is recommended when a woman turns 65 to screen for osteoporosis. This test is only recommended one time, as a screening. Some providers will use this same test as a disease monitoring tool if you already have osteoporosis. -Breast cancer screenings are recommended every other year for women of normal risk, age 54-69. 
-Cervical cancer screenings for women over age 72 are only recommended with certain risk factors. Here is a list of your current Health Maintenance items (your personalized list of preventive services) with a due date: 
Health Maintenance Due Topic Date Due  
 Annual Well Visit  02/01/2019  Hemoglobin A1C    03/07/2019

## 2020-05-04 NOTE — PROGRESS NOTES
Pt is having virtual appointment at home in [place], [place]. This is the Subsequent Medicare Annual Wellness Exam, performed 12 months or more after the Initial AWV or the last Subsequent AWV    Consent: Nikko Devries, who was seen by synchronous (real-time) audio-video technology, and/or her healthcare decision maker, is aware that this patient-initiated, Telehealth encounter on 5/4/2020 is a billable service. While AWVs are fully covered by Medicare, any services rendered on this date that are not included in an AWV are subject to additional billing, with coverage as determined by her insurance carrier. She is aware that she may receive a bill for any such additional services and has provided verbal consent to proceed: Yes. I have reviewed the patient's medical history in detail and updated the computerized patient record.      History     Patient Active Problem List   Diagnosis Code    Morbid obesity ([facility] E66.01    MATEUSZ on CPAP G47.33, Z99.89    Tracheostomy present ([address] Z93.0    Former heavy tobacco smoker Z87.891    MEGHAN (generalized anxiety disorder) F41.1    Prediabetes R73.03    Depression, major, single episode, severe ([address] F32.2    IFG (impaired fasting glucose) R73.01    Palpitations R00.2     Past Medical History:   Diagnosis Date    Depression, major, single episode, severe ([address] 11/16/2017    Morbid obesity ([address] 4/18/2016    Racing heart beat     Routine Papanicolaou smear 02/13/2019    Negative ; HPV Negative     Sleep apnea     Tracheostomy dependence ([address]     for severe sleep apnea       Past Surgical History:   Procedure Laterality Date    HX GYN  2002    removal of benign ovarian cyst - per pt done in OR, under anesthesia, but states had no incisions    HX TRACHEOSTOMY  8/26/13    Due to sleep apnea     Current Outpatient Medications   Medication Sig Dispense Refill    metoprolol tartrate (LOPRESSOR) 25 mg tablet TAKE 1 TABLET BY MOUTH TWICE A DAY 30 Tab 0    omega 3-dha-epa-fish oil (FISH OIL) 100-160-1,000 mg cap Take  by mouth.  aspirin (ASPIRIN) 325 mg tablet Take 325 mg by mouth daily.  multivit,calc,mins/iron/folic (ONE-A-DAY WOMENS FORMULA PO) Take 1 Tab by mouth daily.        Allergies   Allergen Reactions    Sulfa (Sulfonamide Antibiotics) Rash       Family History   Problem Relation Age of Onset    Diabetes Mother     Hypertension Mother     Heart Disease Maternal Grandmother     No Known Problems Father     Breast Cancer Maternal Aunt         Great Aunt - ?breast ?bone    Cancer Other         great aunt - not sure what type of cancer     Social History     Tobacco Use    Smoking status: Former Smoker     Packs/day: 2.00     Years: 10.00     Pack years: 20.00     Last attempt to quit:      Years since quittin.3    Smokeless tobacco: Never Used   Substance Use Topics    Alcohol use: No     Alcohol/week: 0.0 standard drinks       Depression Risk Factor Screening:     3 most recent PHQ Screens 2017   Little interest or pleasure in doing things Not at all   Feeling down, depressed, irritable, or hopeless Nearly every day   Total Score PHQ 2 3   Trouble falling or staying asleep, or sleeping too much Nearly every day   Feeling tired or having little energy Nearly every day   Poor appetite, weight loss, or overeating Not at all   Feeling bad about yourself - or that you are a failure or have let yourself or your family down Nearly every day   Trouble concentrating on things such as school, work, reading, or watching TV Not at all   Moving or speaking so slowly that other people could have noticed; or the opposite being so fidgety that others notice Not at all   Thoughts of being better off dead, or hurting yourself in some way Nearly every day   PHQ 9 Score 15   How difficult have these problems made it for you to do your work, take care of your home and get along with others Extremely difficult       Alcohol Risk Factor Screening:   AUDIT Screen Score: AUDIT Score: 0      Document Brief Intervention (corresponds directly with the 5 A's, Ask, Advise, Assess, Assist, and Arrange):  NA    At- Risk Patients (Score 7-15 for women; 8-15 for men)  Discuss concern patient is drinking at unhealthy levels known to increase risk of alcohol-related health problems. Is Patient ready to commit to change? NA    If No:   Encourage reflection   Discuss short term and long term health risks of consuming alcohol   Barriers to change   Reaffirm willingness to help / Educational materials provided  If Yes:   Set goal  SmartHub provided    Harmful use or Dependence (Score 16 or greater)   Discuss short term and long term health risks of consuming alcohol   Recommendations   Negotiate drinking goal   Recommend addiction specialist/center   Arrange follow-up appointments. Functional Ability and Level of Safety:   Hearing: Hearing is good. Activities of Daily Living:  ADL Assessment 5/4/2020   Feeding yourself No Help Needed   Getting from bed to chair No Help Needed   Getting dressed No Help Needed   Bathing or showering No Help Needed   Walk across the room (includes cane/walker) No Help Needed   Using the telphone No Help Needed   Taking your medications No Help Needed   Preparing meals No Help Needed   Managing money (expenses/bills) No Help Needed   Moderately strenuous housework (laundry) No Help Needed   Shopping for personal items (toiletries/medicines) No Help Needed   Shopping for groceries No Help Needed   Driving No Help Needed   Climbing a flight of stairs No Help Needed   Getting to places beyond walking distances No Help Needed       Ambulation: with no difficulty    Fall Risk:  Fall Risk Assessment, last 12 mths 11/16/2017   Able to walk? Yes   Fall in past 12 months? No       Abuse Screen:  Abuse Screening Questionnaire 5/4/2020   Do you ever feel afraid of your partner?  N   Are you in a relationship with someone who physically or mentally threatens you? N   Is it safe for you to go home? Y         Cognitive Screening   Has your family/caregiver stated any concerns about your memory: no  Cognitive Screening: Normal - Verbal Fluency Test    Patient Care Team   Patient Care Team:  Sasha Allan MD as PCP - General (Family Practice)  Sasha Allan MD as PCP - [facility]  Krys Ribera MD (Cardiology)  Gamaliel Hale MD (Cardiology)    Assessment/Plan   Education and counseling provided:  Are appropriate based on today's review and evaluation  Advance Care Plan was discussed but the patient did not wish or was not able to name a surrogate decision maker or provide an Advance Care Plan       Diagnoses and all orders for this visit:    1. Medicare annual wellness visit, subsequent    2. Screening for alcoholism  -     AR ANNUAL ALCOHOL SCREEN 15 MIN    3. Screening for depression  -     DEPRESSION SCREEN ANNUAL    4. Prediabetes  -     HEMOGLOBIN A1C WITH EAG  -     METABOLIC PANEL, BASIC    5. Elevated blood pressure reading          Blood pressure elevated at her last visit in June - she will check home blood pressures for me    Follow-up and Dispositions    · Return in about 6 weeks (around 6/15/2020) for blood pressure. Reviewed plan of care. Patient has provided input and agrees with goals. Health Maintenance Due   Topic Date Due    Medicare Yearly Exam  02/01/2019    A1C test (Diabetic or Prediabetic)  03/07/2019         Capo Yang is a 46 y.o. female who was evaluated by a video visit encounter for concerns as above. Patient identification was verified prior to start of the visit. A caregiver was present when appropriate. Due to this being a TeleHealth encounter (During [place] [address] public [facility] emergency), evaluation of the following organ systems was limited: Vitals/Constitutional/EENT/Resp/CV/GI//MS/Neuro/Skin/Heme-Lymph-Imm.   Pursuant to the emergency declaration under the [facility], [facility] waiver authority and the [employer] Act, this Virtual  Visit was conducted, with patient's (and/or legal guardian's) consent, to reduce the patient's risk of exposure to COVID-19 and provide necessary medical care. Services were provided through a video synchronous discussion virtually to substitute for in-person clinic visit. Patient and provider were located at their individual homes.     Eusebio Mejia

## 2020-05-14 ENCOUNTER — TELEPHONE (OUTPATIENT)
Dept: FAMILY MEDICINE CLINIC | Age: 40
End: 2020-05-14

## 2020-05-14 NOTE — TELEPHONE ENCOUNTER
Called and spoke with pt. She has been advised and states understanding that any trach supplies needed needs to go through [employer], where is is being treated. Pt states understanding. Pt saw Dr. Courtney Velasco on 02/01/20, for a trach follow up. Pt now has [employer].

## 2020-05-14 NOTE — TELEPHONE ENCOUNTER
Called and pt, and left a voice message, asking that she call the office back when able. ~Need to ask who she see for her [place], they should order her needed supplies.

## 2020-05-14 NOTE — TELEPHONE ENCOUNTER
Pt  Needs to speak with someone about getting her trach supplies covered by her Insurance.  Please call [phone]

## 2020-05-15 NOTE — TELEPHONE ENCOUNTER
Unable to locate Dr. John Sheridan on [facility]'s portal to process referral which usually means provider is non-par with [facility]. All providers listed as participating are with [facility]. Pt may need to find new pulmonologist for care and to receive her supplies.  Danyell

## 2020-05-15 NOTE — TELEPHONE ENCOUNTER
Shazia Mishra, nurse navigator for [facility] interventional pulmonology, left message advising we are unable to process referral through [facility] and to call office regarding DME companies listed with [facility].  Danyell

## 2020-05-15 NOTE — TELEPHONE ENCOUNTER
Pt advised and she declines to contact [facility], stating she's gone to them in the past and they didn't want to touch her trach since it was placed by another provider. Pt states she's working with Celeste at Dr. Lauretha Bloch office and is also going to apply for financial screening through [facility] and wants to remain under their care. Pt requested we call Celeste at [facility] to help her coordinate care with DME.  Danyell

## 2020-05-17 RX ORDER — METOPROLOL TARTRATE 25 MG/1
TABLET, FILM COATED ORAL
Qty: 90 TAB | Refills: 0 | Status: SHIPPED | OUTPATIENT
Start: 2020-05-17 | End: 2020-06-25

## 2020-06-07 ENCOUNTER — APPOINTMENT (OUTPATIENT)
Dept: GENERAL RADIOLOGY | Age: 40
End: 2020-06-07
Attending: PHYSICIAN ASSISTANT
Payer: MEDICARE

## 2020-06-07 ENCOUNTER — TELEPHONE (OUTPATIENT)
Dept: FAMILY MEDICINE CLINIC | Age: 40
End: 2020-06-07

## 2020-06-07 ENCOUNTER — HOSPITAL ENCOUNTER (EMERGENCY)
Age: 40
Discharge: HOME | End: 2020-06-07
Attending: EMERGENCY MEDICINE | Admitting: EMERGENCY MEDICINE
Payer: MEDICARE

## 2020-06-07 VITALS
HEIGHT: 67 IN | DIASTOLIC BLOOD PRESSURE: 50 MMHG | SYSTOLIC BLOOD PRESSURE: 115 MMHG | TEMPERATURE: 101.7 F | BODY MASS INDEX: 45.99 KG/M2 | WEIGHT: 293 LBS | HEART RATE: 96 BPM | OXYGEN SATURATION: 95 % | RESPIRATION RATE: 18 BRPM

## 2020-06-07 DIAGNOSIS — R50.9 FEVER, UNSPECIFIED FEVER CAUSE: Primary | ICD-10-CM

## 2020-06-07 DIAGNOSIS — Z20.822 SUSPECTED COVID-19 VIRUS INFECTION: ICD-10-CM

## 2020-06-07 LAB
ANION GAP SERPL CALC-SCNC: 6 MMOL/L (ref 5–15)
APPEARANCE UR: CLEAR
BACTERIA URNS QL MICRO: NEGATIVE /HPF
BASOPHILS # BLD: 0 K/UL (ref 0–0.1)
BASOPHILS NFR BLD: 0 % (ref 0–1)
BILIRUB UR QL: NEGATIVE
BUN SERPL-MCNC: 5 MG/DL (ref 6–20)
BUN/CREAT SERPL: 5 (ref 12–20)
CALCIUM SERPL-MCNC: 7.8 MG/DL (ref 8.5–10.1)
CHLORIDE SERPL-SCNC: 102 MMOL/L (ref 97–108)
CO2 SERPL-SCNC: 28 MMOL/L (ref 21–32)
COLOR UR: ABNORMAL
COMMENT, HOLDF: NORMAL
CREAT SERPL-MCNC: 0.92 MG/DL (ref 0.55–1.02)
DIFFERENTIAL METHOD BLD: ABNORMAL
EOSINOPHIL # BLD: 0 K/UL (ref 0–0.4)
EOSINOPHIL NFR BLD: 0 % (ref 0–7)
EPITH CASTS URNS QL MICRO: ABNORMAL /LPF
ERYTHROCYTE [DISTWIDTH] IN BLOOD BY AUTOMATED COUNT: 15.1 % (ref 11.5–14.5)
GLUCOSE SERPL-MCNC: 112 MG/DL (ref 65–100)
GLUCOSE UR STRIP.AUTO-MCNC: NEGATIVE MG/DL
HCT VFR BLD AUTO: 34.3 % (ref 35–47)
HGB BLD-MCNC: 11 G/DL (ref 11.5–16)
HGB UR QL STRIP: NEGATIVE
HYALINE CASTS URNS QL MICRO: ABNORMAL /LPF (ref 0–5)
IMM GRANULOCYTES # BLD AUTO: 0 K/UL (ref 0–0.04)
IMM GRANULOCYTES NFR BLD AUTO: 0 % (ref 0–0.5)
KETONES UR QL STRIP.AUTO: NEGATIVE MG/DL
LEUKOCYTE ESTERASE UR QL STRIP.AUTO: NEGATIVE
LYMPHOCYTES # BLD: 0.8 K/UL (ref 0.8–3.5)
LYMPHOCYTES NFR BLD: 22 % (ref 12–49)
MCH RBC QN AUTO: 27.3 PG (ref 26–34)
MCHC RBC AUTO-ENTMCNC: 32.1 G/DL (ref 30–36.5)
MCV RBC AUTO: 85.1 FL (ref 80–99)
MONOCYTES # BLD: 0.2 K/UL (ref 0–1)
MONOCYTES NFR BLD: 6 % (ref 5–13)
NEUTS SEG # BLD: 2.6 K/UL (ref 1.8–8)
NEUTS SEG NFR BLD: 72 % (ref 32–75)
NITRITE UR QL STRIP.AUTO: NEGATIVE
NRBC # BLD: 0 K/UL (ref 0–0.01)
NRBC BLD-RTO: 0 PER 100 WBC
PH UR STRIP: 6 [PH] (ref 5–8)
PLATELET # BLD AUTO: 280 K/UL (ref 150–400)
PMV BLD AUTO: 8.5 FL (ref 8.9–12.9)
POTASSIUM SERPL-SCNC: 3.1 MMOL/L (ref 3.5–5.1)
PROT UR STRIP-MCNC: ABNORMAL MG/DL
RBC # BLD AUTO: 4.03 M/UL (ref 3.8–5.2)
RBC #/AREA URNS HPF: ABNORMAL /HPF (ref 0–5)
SAMPLES BEING HELD,HOLD: NORMAL
SODIUM SERPL-SCNC: 136 MMOL/L (ref 136–145)
SP GR UR REFRACTOMETRY: 1.02 (ref 1–1.03)
UR CULT HOLD, URHOLD: NORMAL
UROBILINOGEN UR QL STRIP.AUTO: 1 EU/DL (ref 0.2–1)
WBC # BLD AUTO: 3.7 K/UL (ref 3.6–11)
WBC URNS QL MICRO: ABNORMAL /HPF (ref 0–4)

## 2020-06-07 PROCEDURE — 36415 COLL VENOUS BLD VENIPUNCTURE: CPT

## 2020-06-07 PROCEDURE — 87635 SARS-COV-2 COVID-19 AMP PRB: CPT

## 2020-06-07 PROCEDURE — 81001 URINALYSIS AUTO W/SCOPE: CPT

## 2020-06-07 PROCEDURE — 71045 X-RAY EXAM CHEST 1 VIEW: CPT

## 2020-06-07 PROCEDURE — 74011250637 HC RX REV CODE- 250/637: Performed by: PHYSICIAN ASSISTANT

## 2020-06-07 PROCEDURE — 99283 EMERGENCY DEPT VISIT LOW MDM: CPT

## 2020-06-07 PROCEDURE — 85025 COMPLETE CBC W/AUTO DIFF WBC: CPT

## 2020-06-07 PROCEDURE — 80048 BASIC METABOLIC PNL TOTAL CA: CPT

## 2020-06-07 PROCEDURE — 74011250636 HC RX REV CODE- 250/636: Performed by: PHYSICIAN ASSISTANT

## 2020-06-07 RX ORDER — ACETAMINOPHEN 325 MG/1
650 TABLET ORAL
Qty: 30 TAB | Refills: 0 | Status: SHIPPED | OUTPATIENT
Start: 2020-06-07 | End: 2020-06-12

## 2020-06-07 RX ORDER — ACETAMINOPHEN 500 MG
1000 TABLET ORAL
Status: COMPLETED | OUTPATIENT
Start: 2020-06-07 | End: 2020-06-07

## 2020-06-07 RX ADMIN — ACETAMINOPHEN 1000 MG: 500 TABLET ORAL at 19:37

## 2020-06-07 RX ADMIN — SODIUM CHLORIDE 1000 ML: 900 INJECTION, SOLUTION INTRAVENOUS at 19:38

## 2020-06-08 ENCOUNTER — VIRTUAL VISIT (OUTPATIENT)
Dept: FAMILY MEDICINE CLINIC | Age: 40
End: 2020-06-08

## 2020-06-08 ENCOUNTER — PATIENT OUTREACH (OUTPATIENT)
Dept: FAMILY MEDICINE CLINIC | Age: 40
End: 2020-06-08

## 2020-06-08 DIAGNOSIS — R68.89 FLU-LIKE SYMPTOMS: Primary | ICD-10-CM

## 2020-06-08 NOTE — PROGRESS NOTES
Patient contacted regarding COVID-19 exposure. Discussed COVID-19 related testing which was pending at this time. Test results were pending. Patient informed of results, if available? no     Care Transition Nurse/ Ambulatory Care Manager contacted the patient by telephone to perform post discharge assessment. Verified name and  with patient as identifiers. Provided introduction to self, and explanation of the CTN/ACM role, and reason for call due to risk factors for infection and/or exposure to COVID-19. Symptoms reviewed with patient who verbalized the following symptoms: no new symptoms and no worsening symptoms. Due to no new or worsening symptoms encounter was not routed to provider for escalation. Discussed follow-up appointments. If no appointment was previously scheduled, appointment scheduling offered: no  [id number] follow up appointment(s): PCP Dr. Jamie Camacho virtual visit today  [id number] [address] follow up appointment(s): none      Patient has following risk factors of: sleep apnea, tracheostomy. CTN/ACM reviewed discharge instructions, medical action plan and red flags such as increased shortness of breath, increasing fever and signs of decompensation with patient who verbalized understanding. Discussed exposure protocols and quarantine with CDC Guidelines What to do if you are sick with coronavirus disease .  Patient was given an opportunity for questions and concerns. The patient agrees to contact the [facility] exposure line [phone], [facility] department [facility]  ([phone]) and PCP office for questions related to their healthcare. CTN/ACM provided contact information for future needs. Reviewed and educated patient on any new and changed medications related to discharge diagnosis.     Patient/family/caregiver given information for [facility] and agrees to enroll no  Patient's preferred e-mail:  n/a  Patient's preferred phone number: n/a  Based on Loop alert triggers, patient will be contacted by nurse care manager for worsening symptoms. Plan for follow-up call in 5-7 days based on severity of symptoms and risk factors.

## 2020-06-08 NOTE — DISCHARGE INSTRUCTIONS
Patient Education   Learning About Coronavirus [phone])  Coronavirus [phone]): Overview  What is coronavirus (CGFUO-60)? The coronavirus disease (COVID-19) is caused by a virus. It is an illness that was first found in [place], [place], in December 2019. It has since spread worldwide. The virus can cause fever, cough, and trouble breathing. In severe cases, it can cause pneumonia and make it hard to breathe without help. It can cause death. Coronaviruses are a large group of viruses. They cause the common cold. They also cause more serious illnesses like Middle East respiratory syndrome (MERS) and severe acute respiratory syndrome (SARS). COVID-19 is caused by a novel coronavirus. That means it's a new type that has not been seen in people before. This virus spreads person-to-person through droplets from coughing and sneezing. It can also spread when you are close to someone who is infected. And it can spread when you touch something that has the virus on it, such as a doorknob or a tabletop. What can you do to protect yourself from coronavirus (COVID-19)? The best way to protect yourself from getting sick is to:  · Avoid areas where there is an outbreak. · Avoid contact with people who may be infected. · Wash your hands often with soap or alcohol-based hand sanitizers. · Avoid crowds and try to stay at least 6 feet away from other people. · Wash your hands often, especially after you cough or sneeze. Use soap and water, and scrub for at least 20 seconds. If soap and water aren't available, use an alcohol-based hand . · Avoid touching your mouth, nose, and eyes. What can you do to avoid spreading the virus to others? To help avoid spreading the virus to others:  · Cover your mouth with a tissue when you cough or sneeze. Then throw the tissue in the trash. · Use a disinfectant to clean things that you touch often. · Stay home if you are sick or have been exposed to the virus.  Don't go to school, work, or public areas. And don't use public transportation. · If you are sick:  ? Leave your home only if you need to get medical care. But call the doctor's office first so they know you're coming. And wear a face mask, if you have one.  ? If you have a face mask, wear it whenever you're around other people. It can help stop the spread of the virus when you cough or sneeze. ? Clean and disinfect your home every day. Use household  and disinfectant wipes or sprays. Take special care to clean things that you grab with your hands. These include doorknobs, remote controls, phones, and handles on your refrigerator and microwave. And don't forget countertops, tabletops, bathrooms, and computer keyboards. When to call for help  Call 911 anytime you think you may need emergency care. For example, call if:  · You have severe trouble breathing. (You can't talk at all.)  · You have constant chest pain or pressure. · You are severely dizzy or lightheaded. · You are confused or can't think clearly. · Your face and lips have a blue color. · You pass out (lose consciousness) or are very hard to wake up. Call your doctor now if you develop symptoms such as:  · Shortness of breath. · Fever. · Cough. If you need to get care, call ahead to the doctor's office for instructions before you go. Make sure you wear a face mask, if you have one, to prevent exposing other people to the virus. Where can you get the latest information? The following health organizations are tracking and studying this virus. Their websites contain the most up-to-date information. Paula Veliz also learn what to do if you think you may have been exposed to the virus. · U.S. Centers for Disease Control and Prevention (CDC): The CDC provides updated news about the disease and travel advice. The website also tells you how to prevent the spread of infection.  www.cdc.gov  · World Health Organization [facility]): WHO offers information about the virus outbreaks. WHO also has travel advice. www.who.int  Current as of: April 1, 2020               Content Version: 12.4  © 2006-2020 Healthwise, Incorporated. Care instructions adapted under license by your healthcare professional. If you have questions about a medical condition or this instruction, always ask your healthcare professional. [address] any warranty or liability for your use of this information.

## 2020-06-08 NOTE — PROGRESS NOTES
Magdy Vargas is a 46 y.o. female who was seen by synchronous (real-time) audio-video technology on 6/8/2020. Assessment & Plan:   Diagnoses and all orders for this visit:    1. Flu-like symptoms        Suspect COVID-19  Await test results  Rest, push fluids, Tylenol prn  Self isolate    Follow-up and Dispositions    · Return if symptoms worsen or fail to improve. Reviewed plan of care. Patient has provided input and agrees with goals. CPT Codes 79668-01300 for Established Patients may apply to this Telehealth Visit      Subjective:   Magdy Vargas was seen for Fever (follow up from ED ) and Chills (follow up from ED )      Patient presents with:  Fever: follow up from ED   Chills: follow up from ED     Her symptoms started over a week ago. She was in the ER yesterday for suspected COVID-19. Testing was done, but it is not back yet. Today, she is feeling a lot better but a little sluggish. The last time she checked her temperature was just recently, it was 100.7. Tylenol is helping a fair amount. Review of Systems   Constitutional: Positive for chills, fever and malaise/fatigue. HENT: Positive for congestion. Negative for sore throat. No loss of taste or smell   Respiratory: Negative for cough, shortness of breath and wheezing. Cardiovascular: Negative for chest pain. Gastrointestinal: Negative for abdominal pain and diarrhea. No appetite   Musculoskeletal: Negative for myalgias. Neurological: Negative for dizziness. Objective:   RR 20  Physical Exam  Constitutional:       General: She is not in acute distress. Appearance: Normal appearance. She is not diaphoretic. Pulmonary:      Effort: Pulmonary effort is normal.   Neurological:      Mental Status: She is alert and oriented to person, place, and time. Due to this being a TeleHealth evaluation, many elements of the physical examination are unable to be assessed. We discussed the expected course, resolution and complications of the diagnosis(es) in detail. Medication risks, benefits, costs, interactions, and alternatives were discussed as indicated. I advised her to contact the office if her condition worsens, changes or fails to improve as anticipated. She expressed understanding with the diagnosis(es) and plan. Pursuant to the emergency declaration under the [address] [place] [address] waiver authority and the [employer] and [employer] Act, this Virtual  Visit was conducted, with patient's consent, to reduce the patient's risk of exposure to COVID-19 and provide continuity of care for an established patient. Services were provided through a video synchronous discussion virtually to substitute for in-person clinic visit.     Santana Nguyen MD

## 2020-06-08 NOTE — ED PROVIDER NOTES
45 y/o female with pmhx of depression, morbid obesity, tachycardia, sleep apnea requiring tracheostomy presents with fever for 5 days. Temperature ranges from 100-103F. Pt has taken tylenol intermittently but not scheduled. Notes associated body aches and fatigue. Her mom tested positive for COVID19 about 2 weeks ago. Pt denies cough, shortness of breath, nausea, vomiting, diarrhea, dysuria. No other complaints at this time.            Past Medical History:   Diagnosis Date    Depression, major, single episode, severe ([address] 2017    Morbid obesity ([address] 2016    Racing heart beat     Routine Papanicolaou smear 2019    Negative ; HPV Negative     Sleep apnea     Tracheostomy dependence ([address]     for severe sleep apnea        Past Surgical History:   Procedure Laterality Date    HX GYN      removal of benign ovarian cyst - per pt done in OR, under anesthesia, but states had no incisions    HX TRACHEOSTOMY  14    Due to sleep apnea         Family History:   Problem Relation Age of Onset    Diabetes Mother     Hypertension Mother     Heart Disease Maternal Grandmother     No Known Problems Father     Breast Cancer Maternal Aunt         Great Aunt - ?breast ?bone    Cancer Other         great aunt - not sure what type of cancer       Social History     Socioeconomic History    Marital status: SINGLE     Spouse name: Not on file    Number of children: Not on file    Years of education: Not on file    Highest education level: Not on file   Occupational History    Not on file   Social Needs    Financial resource strain: Not on file    Food insecurity     Worry: Not on file     Inability: Not on file   [employer] needs     Medical: Not on file     Non-medical: Not on file   Tobacco Use    Smoking status: Former Smoker     Packs/day: 2.00     Years: 10.00     Pack years: 20.00     Last attempt to quit:      Years since quittin.4    Smokeless tobacco: Never Used Substance and Sexual Activity    Alcohol use: No     Alcohol/week: 0.0 standard drinks    Drug use: No     Comment: Smoked Marijuana in [id number], but not current user. -vr    Sexual activity: Not Currently   Lifestyle    Physical activity     Days per week: Not on file     Minutes per session: Not on file    Stress: Not on file   Relationships    Social connections     Talks on phone: Not on file     Gets together: Not on file     Attends [employer] service: Not on file     Active member of club or organization: Not on file     Attends meetings of clubs or organizations: Not on file     Relationship status: Not on file    Intimate partner violence     Fear of current or ex partner: Not on file     Emotionally abused: Not on file     Physically abused: Not on file     Forced sexual activity: Not on file   Other Topics Concern    Not on file   Social History Narrative    Not on file         ALLERGIES: Sulfa (sulfonamide antibiotics)    Review of Systems   Constitutional: Positive for chills, fatigue and fever. HENT: Negative for congestion and sore throat. Respiratory: Negative for cough and shortness of breath. Cardiovascular: Negative for chest pain. Gastrointestinal: Negative for nausea and vomiting. Genitourinary: Negative for dysuria. Musculoskeletal: Negative for myalgias. Skin: Negative for rash. Neurological: Negative for dizziness and weakness. Vitals:    06/07/20 1725 06/07/20 1936   BP: 119/79    Pulse: 94    Resp: 16    Temp: (!) 102.8 °F (39.3 °C) (!) 102 °F (38.9 °C)   SpO2: 96%    Weight: (!) 192.8 kg (425 lb)    Height: 5' 7\" (1.702 m)             Physical Exam  Vitals signs and nursing note reviewed. Constitutional:       General: She is not in acute distress. Appearance: She is obese. She is not ill-appearing. HENT:      Head: Normocephalic. Nose: No rhinorrhea. Mouth/Throat:      Mouth: Mucous membranes are moist.      Pharynx: Oropharynx is clear. No posterior oropharyngeal erythema. Eyes:      Pupils: Pupils are equal, round, and reactive to light. Neck:      Musculoskeletal: Normal range of motion. Cardiovascular:      Rate and Rhythm: Normal rate and regular rhythm. Heart sounds: Normal heart sounds. Pulmonary:      Effort: Pulmonary effort is normal. No accessory muscle usage or respiratory distress. Breath sounds: Normal breath sounds. No decreased breath sounds or wheezing. Comments: Tracheostomy  Abdominal:      General: There is no distension. Palpations: Abdomen is soft. Skin:     General: Skin is warm. Capillary Refill: Capillary refill takes less than 2 seconds. Findings: No erythema or rash. Neurological:      Mental Status: She is alert. Psychiatric:         Mood and Affect: Mood normal.         Behavior: Behavior normal.        Labs Reviewed   CBC WITH AUTOMATED DIFF - Abnormal; Notable for the following components:       Result Value    HGB 11.0 (*)     HCT 34.3 (*)     RDW 15.1 (*)     MPV 8.5 (*)     All other components within normal limits   METABOLIC PANEL, BASIC - Abnormal; Notable for the following components:    Potassium 3.1 (*)     Glucose 112 (*)     BUN 5 (*)     BUN/Creatinine ratio 5 (*)     Calcium 7.8 (*)     All other components within normal limits   URINALYSIS W/MICROSCOPIC - Abnormal; Notable for the following components:    Protein TRACE (*)     All other components within normal limits   URINE CULTURE HOLD SAMPLE   SAMPLES BEING HELD   SARS-COV-2     XR CHEST PORT   Final Result   IMPRESSION: No acute findings. Medications   sodium chloride 0.9 % bolus infusion 1,000 mL (0 mL IntraVENous IV Completed 6/7/20 2037)   acetaminophen (TYLENOL) tablet 1,000 mg (1,000 mg Oral Given 6/7/20 1937)         MDM       45 y/o female with pmhx depression, morbid obesity, tachycardia, sleep apnea requiring tracheostomy presents with 5 days of fever.  Known COVID exposure to mother whom she lives with. Chest xray unremarkable for infiltrate or effusion. Normal WBC. UA shows no signs of acute infection. Febrile, O2 sat 95% on room air. Treated in ED with tylenol and IV fluids which improved fatigue and temperature. Tested for COVID19 in ED. Discussed likelihood of COVID19 infection as source of fever and educated on course of disease. Discussed scheduled tylenol and increased hydration. Discussed indications for return to ED such as inability to tolerate PO, difficulty breathing, extreme weakness. Case reviewed with attending physician, Dr. Byron Contreras.     Procedures      Estela Munguia PA-C  6/9/2020

## 2020-06-08 NOTE — PROGRESS NOTES
Chief Complaint   Patient presents with    Fever     follow up from ED     Chills     follow up from ED      Pt is having virtual appointment at home in [place], [place].

## 2020-06-09 NOTE — PROGRESS NOTES
I called and spoke with patient concerning results.   Questions answered and reasons to return to ER discussed

## 2020-06-11 ENCOUNTER — VIRTUAL VISIT (OUTPATIENT)
Dept: FAMILY MEDICINE CLINIC | Age: 40
End: 2020-06-11

## 2020-06-11 DIAGNOSIS — U07.1 COVID-19: Primary | ICD-10-CM

## 2020-06-11 LAB
SARS-COV-2, COV2NT: DETECTED
SOURCE, COVRS: ABNORMAL

## 2020-06-11 NOTE — PROGRESS NOTES
Chief Complaint   Patient presents with    Sweats     Temperature changes. 1. Have you been to the ER, urgent care clinic since your last visit? Hospitalized since your last visit? Yes, [facility] ER, Covid positive 06/08/2020.    2. Have you seen or consulted any other health care providers outside of the [address] [place] [address] since your last visit? Include any pap smears or colon screening.  No    Patient is completing visit from [facility].

## 2020-06-11 NOTE — PROGRESS NOTES
Thamas Ormond is a 46 y.o. female who was seen by synchronous (real-time) audio-video technology on 6/11/2020. Assessment & Plan:   Diagnoses and all orders for this visit:    1. COVID-19        Recovering, but still taking Tylenol  Rest  Stop Tylenol  Continue self isolation - she may return to her usual activities if she remains afebrile for 5 days  Reassured her temperature is normal    Follow-up and Dispositions    · Return if symptoms worsen or fail to improve. Reviewed plan of care. Patient has provided input and agrees with goals. CPT Codes 61723-91470 for Established Patients may apply to this Telehealth Visit      Subjective:   Thamas Ormond was seen for Sweats (Temperature changes.)      Patient presents with:  Sweats: Temperature changes. She was diagnosed with COVID-19 on the 7th. The patient states she is doing great. Her Tmax was 99.6 in the past 24 hours, which is improving. Her last Tylenol was over 6 hours ago. It has been working well. Nothing is making her worse. She is concerned because her temperature was 97.9. Review of Systems   Constitutional: Positive for malaise/fatigue. Negative for chills and fever. HENT: Negative for congestion and sore throat. No loss of taste or smell   Respiratory: Negative for cough, shortness of breath and wheezing. Cardiovascular: Negative for chest pain. Gastrointestinal: Negative for diarrhea. Musculoskeletal: Negative for myalgias. Neurological: Negative for headaches. Objective:   RR 16  Physical Exam  Constitutional:       General: She is not in acute distress. Appearance: Normal appearance. She is not diaphoretic. Pulmonary:      Effort: Pulmonary effort is normal.   Neurological:      Mental Status: She is alert and oriented to person, place, and time.          Due to this being a TeleHealth evaluation, many elements of the physical examination are unable to be assessed. We discussed the expected course, resolution and complications of the diagnosis(es) in detail. Medication risks, benefits, costs, interactions, and alternatives were discussed as indicated. I advised her to contact the office if her condition worsens, changes or fails to improve as anticipated. She expressed understanding with the diagnosis(es) and plan. Pursuant to the emergency declaration under the [address] [place] [address] [facility] authority and the [employer] and [employer] Act, this Virtual  Visit was conducted, with patient's consent, to reduce the patient's risk of exposure to COVID-19 and provide continuity of care for an established patient. Services were provided through a video synchronous discussion virtually to substitute for in-person clinic visit.     Ingris Sheridan MD

## 2020-06-15 ENCOUNTER — TELEPHONE (OUTPATIENT)
Dept: FAMILY MEDICINE CLINIC | Age: 40
End: 2020-06-15

## 2020-06-15 NOTE — TELEPHONE ENCOUNTER
Please call patient and let her know it is fine to come out of quarantine. A letter has been printed.

## 2020-06-15 NOTE — LETTER
NOTIFICATION RETURN TO WORK / SCHOOL 
 
6/15/2020 1:23 PM 
 
Ms. Adina Smart [address] [phone] To Whom It May Concern: 
 
Adina Smart is currently under the care of [address] [place] [address]. She will return to work/school on: 6/16/20. She is excused starting 5/26/20. If there are questions or concerns please have the patient contact our office.  
 
 
 
Sincerely, 
 
 
Santana Nguyen MD

## 2020-06-15 NOTE — TELEPHONE ENCOUNTER
Called patient told her it was ok to come out of quarantine per . Also there is a letter in her chart that I mailed to her for school.

## 2020-06-16 ENCOUNTER — PATIENT OUTREACH (OUTPATIENT)
Dept: FAMILY MEDICINE CLINIC | Age: 40
End: 2020-06-16

## 2020-06-16 NOTE — PROGRESS NOTES
7 Day follow-up call    Patient contacted regarding COVID-19 diagnosis. Discussed COVID-19 related testing which was available at this time. Test results were positive. Patient informed of results, if available? yes       Symptoms reviewed with patient who verbalized the following symptoms: no new symptoms and no worsening symptoms. Due to no new or worsening symptoms encounter was not routed to provider for escalation. Discussed follow-up appointments. If no appointment was previously scheduled, appointment scheduling offered: no  [address] follow up appointment(s): No future appointments. Non-[facility] follow up appointment(s): none     Plan for follow-up call in 5-7 days based on severity of symptoms and risk factors.

## 2020-06-24 ENCOUNTER — PATIENT OUTREACH (OUTPATIENT)
Dept: FAMILY MEDICINE CLINIC | Age: 40
End: 2020-06-24

## 2020-06-24 NOTE — PROGRESS NOTES
Patient resolved from Transition of Care episode on 6/24/20. ACM/CTN was unsuccessful at contacting this patient today. Patient/family was provided the following resources and education related to COVID-19 during the initial call:                         Signs, symptoms and red flags related to COVID-19            CDC exposure and quarantine guidelines            [facility] exposure contact - [phone]            Contact for their local Department of Health                 Patient has not had any additional ED or hospital visits. No further outreach scheduled with this CTN/ACM. Episode of Care resolved. Patient has this CTN/ACM contact information if future needs arise.

## 2020-06-25 RX ORDER — METOPROLOL TARTRATE 25 MG/1
TABLET, FILM COATED ORAL
Qty: 90 TAB | Refills: 4 | Status: SHIPPED | OUTPATIENT
Start: 2020-06-25 | End: 2021-03-27

## 2020-07-09 ENCOUNTER — VIRTUAL VISIT (OUTPATIENT)
Dept: FAMILY MEDICINE CLINIC | Age: 40
End: 2020-07-09

## 2020-07-09 DIAGNOSIS — R25.2 MUSCLE CRAMP: ICD-10-CM

## 2020-07-09 DIAGNOSIS — I10 ESSENTIAL HYPERTENSION: ICD-10-CM

## 2020-07-09 DIAGNOSIS — Z86.16 HISTORY OF 2019 NOVEL CORONAVIRUS DISEASE (COVID-19): ICD-10-CM

## 2020-07-09 DIAGNOSIS — E87.6 HYPOKALEMIA: Primary | ICD-10-CM

## 2020-07-09 RX ORDER — ASPIRIN 325 MG
TABLET, DELAYED RELEASE (ENTERIC COATED) ORAL
COMMUNITY

## 2020-07-09 RX ORDER — POTASSIUM CHLORIDE 20 MEQ/1
20 TABLET, EXTENDED RELEASE ORAL DAILY
Qty: 30 TAB | Refills: 1 | Status: SHIPPED | OUTPATIENT
Start: 2020-07-09 | End: 2020-07-31

## 2020-07-09 RX ORDER — HYDROCHLOROTHIAZIDE 12.5 MG/1
12.5 TABLET ORAL DAILY
Qty: 30 TAB | Refills: 1 | Status: SHIPPED | OUTPATIENT
Start: 2020-07-09 | End: 2020-07-31

## 2020-07-09 NOTE — PROGRESS NOTES
Chief Complaint   Patient presents with    Concern For COVID-19 (Coronavirus)     previous positive for covid-19, re-exposed x 3 weeks ago.  Arm Pain     Bilateral   1. Have you been to the ER, urgent care clinic since your last visit? Hospitalized since your last visit? No    2. Have you seen or consulted any other health care providers outside of the [address] [place] [address] since your last visit? Include any pap smears or colon screening.  No

## 2020-07-09 NOTE — PROGRESS NOTES
Selene Villa is a 45 y.o. female who was seen by synchronous (real-time) audio-video technology on 7/9/2020. Consent: Selene Villa, who was seen by synchronous (real-time) audio-video technology, and/or her healthcare decision maker, is aware that this patient-initiated, Telehealth encounter on 7/9/2020 is a billable service, with coverage as determined by her insurance carrier. She is aware that she may receive a bill and has provided verbal consent to proceed: Yes. Assessment & Plan:   1. Hypokalemia  Recommend labs in 2 wk  + kdur daily  - METABOLIC PANEL, BASIC; Future    2. Muscle cramp  May improve with k supplementation, hydration supplementation and bp control  - METABOLIC PANEL, BASIC; Future    3. Essential hypertension  Add hctz to metoprolol and watch lytes    4. History of 2019 novel coronavirus disease (COVID-19)  Long talk about potential to continue shedding, might be able to recheck her test, she's not sure if she's been re-exposed or not, she wants to go to [employer] but is worried to. I advised that it is safest for everyone to still be staying home and not going to enclosed settings and if she can find a way she feels comfortable to [employer] from home it would be safer for her and those around, we do not know how long people shed the virus for. We do not have the set up now to do the two negative tests for her but I'll look into this--i'm also not sure what the insurance coverage is for that. If she chooses to go, I recommend a mask and social distancing (6 ft minimum between non household members)          Pt was counseled on risks, benefits and alternatives of treatment options. All questions were asked and answered and the patient was agreeable with the treatment plan as outlined.     Encounter time today was 25 minutes and more than 50% of this encounter was spent in counseling face-to-face regarding Diagnosis, Patient Education, Medication Management, Compliance and Impressions. Time documented includes face to face time, documentation and chart review if applicable except as noted. Subjective:   Linda Whitten is a 45 y.o. female who was seen for Concern For COVID-19 (Coronavirus) (previous positive for covid-19, re-exposed x 3 weeks ago.) and Arm Pain (Bilateral)      The patient wants to go to [employer] on Sunday  Her mom keeps children, one of the kids she keeps got it and recovered  One of the other children is coughing now too  The patient is worried about whether she should go to [employer]  She was positive about a month ago, she thinks she probably shouldn't go to her [employer] meeting    Her bp is running in the 150s/90s, she ran out of hctz   hypok on last labs  Arm cramping  Wondering if she could get on a k supp and also hctz again  No chest pain  No leg swelling      Medications, allergies, PMH, PSH, SOCH, [employer] [employer] [facility] reviewed and updated per routine protocol, see chart for review and changes if not noted here. ROS  A 12 point review of systems was negative except as noted here or in the HPI. Objective:   Vital Signs: (As obtained by patient/caregiver at home)  There were no vitals taken for this visit.      [INSTRUCTIONS:  \"[x]\" Indicates a positive item  \"[]\" Indicates a negative item  -- DELETE ALL ITEMS NOT EXAMINED]    Constitutional: [x] Appears well-developed and well-nourished [x] No apparent distress      [] Abnormal -     Mental status: [x] Alert and awake  [x] Oriented to person/place/time [x] Able to follow commands    [] Abnormal -     Eyes:   EOM    [x]  Normal    [] Abnormal -   Sclera  [x]  Normal    [] Abnormal -          Discharge [x]  None visible   [] Abnormal -     HENT: [x] Normocephalic, atraumatic  [] Abnormal -   [x] Mouth/Throat: Mucous membranes are moist    External Ears [x] Normal  [] Abnormal -    Neck: [x] No visualized mass [] Abnormal -     Pulmonary/Chest: [x] Respiratory effort normal   [x] No visualized signs of difficulty breathing or respiratory distress        [] Abnormal -      Musculoskeletal:   [x] Normal gait with no signs of ataxia         [x] Normal range of motion of neck        [] Abnormal -     Neurological:        [x] No Facial Asymmetry (Cranial nerve 7 motor function) (limited exam due to video visit)          [x] No gaze palsy        [] Abnormal -          Skin:        [x] No significant exanthematous lesions or discoloration noted on facial skin         [] Abnormal -            Psychiatric:       [x] Normal Affect [] Abnormal -        [x] No Hallucinations    Other pertinent observable physical exam findings:well appearing, obese, trach in place    We discussed the expected course, resolution and complications of the diagnosis(es) in detail. Medication risks, benefits, costs, interactions, and alternatives were discussed as indicated. I advised her to contact the office if her condition worsens, changes or fails to improve as anticipated. She expressed understanding with the diagnosis(es) and plan. Nicholas Burks is a 45 y.o. female who was evaluated by a video visit encounter for concerns as above. Patient identification was verified prior to start of the visit. A caregiver was present when appropriate. Due to this being a TeleHealth encounter (During [place]-22 public health emergency), evaluation of the following organ systems was limited: Vitals/Constitutional/EENT/Resp/CV/GI//MS/Neuro/Skin/Heme-Lymph-Imm. Pursuant to the emergency declaration under the [facility], 1135 waiver authority and the [employer] and [employer] General Act, this Virtual  Visit was conducted, with patient's (and/or legal guardian's) consent, to reduce the patient's risk of exposure to COVID-19 and provide necessary medical care. Services were provided through a video synchronous discussion virtually to substitute for in-person clinic visit.    Patient and provider were located at their individual homes. Tyrell Arredondo MD  [facility]  07/09/20 3:29 PM     Portions of this note may have been populated using smart dictation software and may have \"sounds-like\" errors present.

## 2020-07-31 RX ORDER — HYDROCHLOROTHIAZIDE 12.5 MG/1
TABLET ORAL
Qty: 30 TAB | Refills: 1 | Status: SHIPPED | OUTPATIENT
Start: 2020-07-31 | End: 2020-12-03 | Stop reason: SDUPTHER

## 2020-07-31 RX ORDER — POTASSIUM CHLORIDE 1500 MG/1
TABLET, EXTENDED RELEASE ORAL
Qty: 30 TAB | Refills: 1 | Status: SHIPPED | OUTPATIENT
Start: 2020-07-31 | End: 2021-03-18

## 2020-08-10 ENCOUNTER — VIRTUAL VISIT (OUTPATIENT)
Dept: FAMILY MEDICINE CLINIC | Age: 40
End: 2020-08-10
Payer: MEDICARE

## 2020-08-10 DIAGNOSIS — R20.0 NUMBNESS OF TOES: ICD-10-CM

## 2020-08-10 DIAGNOSIS — E87.6 HYPOKALEMIA: Primary | ICD-10-CM

## 2020-08-10 DIAGNOSIS — M79.603 PAIN OF UPPER EXTREMITY, UNSPECIFIED LATERALITY: ICD-10-CM

## 2020-08-10 PROCEDURE — G8427 DOCREV CUR MEDS BY ELIG CLIN: HCPCS | Performed by: FAMILY MEDICINE

## 2020-08-10 PROCEDURE — 99213 OFFICE O/P EST LOW 20 MIN: CPT | Performed by: FAMILY MEDICINE

## 2020-08-10 PROCEDURE — G9717 DOC PT DX DEP/BP F/U NT REQ: HCPCS | Performed by: FAMILY MEDICINE

## 2020-08-10 NOTE — PROGRESS NOTES
Chief Complaint   Patient presents with    Numbness     right toe     Arm Pain     left arm      Pt is

## 2020-08-10 NOTE — PROGRESS NOTES
Shruthi Pisano is a 45 y.o. female who was seen by synchronous (real-time) audio-video technology on 8/10/2020. Consent: Shruthi Pisano, who was seen by synchronous (real-time) audio-video technology, and/or her healthcare decision maker, is aware that this patient-initiated, Telehealth encounter on 8/10/2020 is a billable service, with coverage as determined by her insurance carrier. She is aware that she may receive a bill and has provided verbal consent to proceed: Yes. Assessment & Plan:   1. Hypokalemia  2. Pain of upper extremity, unspecified laterality  3. Numbness of toes    Please go for labs and f/u in 2 wk  Drink water  Take all medications as prescribed            Pt was counseled on risks, benefits and alternatives of treatment options. All questions were asked and answered and the patient was agreeable with the treatment plan as outlined. Encounter time today was 15 minutes and more than 50% of this encounter was spent in counseling face-to-face regarding Diagnosis, Patient Education, Medication Management, Compliance and Impressions. Time documented includes face to face time, documentation and chart review if applicable except as noted. Subjective:   Shruthi Pisano is a 45 y.o. female who was seen for Numbness (right toe ) and Arm Pain (left arm )    The toes in her right foot are feeling numb she reports. She also reports that she is having some left arm pains that aren't going away and it radiates as well to her back  Everything is intermittent  No chest pain  Did have covid now recovered  Was meant to get follow up labs, hasn't gone for them yet  No a1c since 2018  Last K was 3.1, was on K supplement but she stopped reports to me she thinks this was the cause of symptosm    Medications, allergies, PMH, PSH, SOCH, [facility] reviewed and updated per routine protocol, see chart for review and changes if not noted here.     ROS  A 12 point review of systems was negative except as noted here or in the HPI.     Objective:   Vital Signs: (As obtained by patient/caregiver at home)  Patient-Reported Vitals 8/10/2020   Patient-Reported Weight unable to obtain   Patient-Reported Height -   Patient-Reported Temperature -   Patient-Reported Systolic  -   Patient-Reported LMP -        [INSTRUCTIONS:  \"[x]\" Indicates a positive item  \"[]\" Indicates a negative item  -- DELETE ALL ITEMS NOT EXAMINED]    Constitutional: [x] Appears well-developed and well-nourished [x] No apparent distress      [] Abnormal -     Mental status: [x] Alert and awake  [x] Oriented to person/place/time [x] Able to follow commands    [] Abnormal -     Eyes:   EOM    [x]  Normal    [] Abnormal -   Sclera  [x]  Normal    [] Abnormal -          Discharge [x]  None visible   [] Abnormal -     HENT: [x] Normocephalic, atraumatic  [] Abnormal -   [x] Mouth/Throat: Mucous membranes are moist    External Ears [x] Normal  [] Abnormal -    Neck: [x] No visualized mass [] Abnormal -     Pulmonary/Chest: [x] Respiratory effort normal   [x] No visualized signs of difficulty breathing or respiratory distress        [] Abnormal -      Musculoskeletal:   [x] Normal gait with no signs of ataxia         [x] Normal range of motion of neck        [] Abnormal -     Neurological:        [x] No Facial Asymmetry (Cranial nerve 7 motor function) (limited exam due to video visit)          [x] No gaze palsy        [] Abnormal -          Skin:        [x] No significant exanthematous lesions or discoloration noted on facial skin         [] Abnormal -            Psychiatric:       [x] Normal Affect [] Abnormal -        [x] No Hallucinations    Other pertinent observable physical exam findings:briefly able to see patient before after several minutes we converted to telephone b/c of connection, trach noted, eyes have mild bulging/proptotic appearance that is stable, breathing comfortably and speaking in full sentences    We discussed the expected course, resolution and complications of the diagnosis(es) in detail. Medication risks, benefits, costs, interactions, and alternatives were discussed as indicated. I advised her to contact the office if her condition worsens, changes or fails to improve as anticipated. She expressed understanding with the diagnosis(es) and plan. More Ott is a 45 y.o. female who was evaluated by a video visit encounter for concerns as above. Patient identification was verified prior to start of the visit. A caregiver was present when appropriate. Due to this being a TeleHealth encounter (During [facility]-89 public health emergency), evaluation of the following organ systems was limited: Vitals/Constitutional/EENT/Resp/CV/GI//MS/Neuro/Skin/Heme-Lymph-Imm. Pursuant to the emergency declaration under the [address] [place] [address], [facility] waiver authority and the [employer] and [employer] Act, this Virtual  Visit was conducted, with patient's (and/or legal guardian's) consent, to reduce the patient's risk of exposure to COVID-19 and provide necessary medical care. Services were provided through a video synchronous discussion virtually to substitute for in-person clinic visit. Patient and provider were located at their individual homes. Jia Aguilar MD  [facility]  08/10/20 2:20 PM     Portions of this note may have been populated using smart dictation software and may have \"sounds-like\" errors present.

## 2020-08-10 NOTE — PROGRESS NOTES
Chief Complaint   Patient presents with    Numbness     right toe     Arm Pain     left arm      Pt is having virtual appointment at home in [place], [facility]

## 2020-08-20 LAB
BUN SERPL-MCNC: 9 MG/DL (ref 6–20)
BUN/CREAT SERPL: 13 (ref 9–23)
CALCIUM SERPL-MCNC: 9.4 MG/DL (ref 8.7–10.2)
CHLORIDE SERPL-SCNC: 103 MMOL/L (ref 96–106)
CO2 SERPL-SCNC: 23 MMOL/L (ref 20–29)
CREAT SERPL-MCNC: 0.72 MG/DL (ref 0.57–1)
EST. AVERAGE GLUCOSE BLD GHB EST-MCNC: 134 MG/DL
GLUCOSE SERPL-MCNC: 105 MG/DL (ref 65–99)
HBA1C MFR BLD: 6.3 % (ref 4.8–5.6)
POTASSIUM SERPL-SCNC: 4.3 MMOL/L (ref 3.5–5.2)
SODIUM SERPL-SCNC: 139 MMOL/L (ref 134–144)

## 2020-08-23 ENCOUNTER — E-VISIT (OUTPATIENT)
Dept: FAMILY MEDICINE CLINIC | Age: 40
End: 2020-08-23

## 2020-09-03 ENCOUNTER — VIRTUAL VISIT (OUTPATIENT)
Dept: FAMILY MEDICINE CLINIC | Age: 40
End: 2020-09-03
Payer: MEDICARE

## 2020-09-03 ENCOUNTER — TELEPHONE (OUTPATIENT)
Dept: FAMILY MEDICINE CLINIC | Age: 40
End: 2020-09-03

## 2020-09-03 DIAGNOSIS — R73.03 PREDIABETES: ICD-10-CM

## 2020-09-03 DIAGNOSIS — I10 ESSENTIAL HYPERTENSION: ICD-10-CM

## 2020-09-03 DIAGNOSIS — E66.01 MORBID OBESITY (HCC): ICD-10-CM

## 2020-09-03 DIAGNOSIS — Z93.0 TRACHEOSTOMY PRESENT (HCC): ICD-10-CM

## 2020-09-03 DIAGNOSIS — E87.6 HYPOKALEMIA: Primary | ICD-10-CM

## 2020-09-03 PROCEDURE — 99214 OFFICE O/P EST MOD 30 MIN: CPT | Performed by: FAMILY MEDICINE

## 2020-09-03 PROCEDURE — G9717 DOC PT DX DEP/BP F/U NT REQ: HCPCS | Performed by: FAMILY MEDICINE

## 2020-09-03 PROCEDURE — G8427 DOCREV CUR MEDS BY ELIG CLIN: HCPCS | Performed by: FAMILY MEDICINE

## 2020-09-03 NOTE — PROGRESS NOTES
Gavi Kaur is a 45 y.o. female who was seen by synchronous (real-time) audio-video technology on 9/3/2020. Consent: Gavi Kaur, who was seen by synchronous (real-time) audio-video technology, and/or her healthcare decision maker, is aware that this patient-initiated, Telehealth encounter on 9/3/2020 is a billable service, with coverage as determined by her insurance carrier. She is aware that she may receive a bill and has provided verbal consent to proceed: Yes. Assessment & Plan:   1. Hypokalemia  Stable, labs in 4 m, c/w supplementation  - METABOLIC PANEL, COMPREHENSIVE; Future  - HEMOGLOBIN A1C WITH EAG; Future    2. Essential hypertension  C/w bp meds, next visit in office  - METABOLIC PANEL, COMPREHENSIVE; Future  - HEMOGLOBIN A1C WITH EAG; Future    3. Prediabetes  a1c prediabetic, next visit with labs, discussion on diet, changing, lower carb, healthy movement  - METABOLIC PANEL, COMPREHENSIVE; Future  - HEMOGLOBIN A1C WITH EAG; Future    4. Morbid obesity ([facility] [address]  As above    5. Tracheostomy present ([facility] [address]  Stable, unchanged          Pt was counseled on risks, benefits and alternatives of treatment options. All questions were asked and answered and the patient was agreeable with the treatment plan as outlined. Encounter time today was 25 minutes and more than 50% of this encounter was spent in counseling face-to-face regarding Diagnosis, Patient Education, Medication Management, Compliance and Impressions. Time documented includes face to face time, documentation and chart review if applicable except as noted. Subjective:   Gavi Kaur is a 45 y.o. female who was seen for Diabetes      HTN: patient reports stable on medications. No chest pain, sob, headache, blurred vision, leg swelling, diaphoresis, falls. Compliant with medications as prescribed. not checking blood pressures at home and reports range is unk/unk.  No significant hyper or hypotensive episodes that the patient reports today. Compliant with her hctz and also her K supp    Thinks she just had a \"spot of gout\" and had some swelling of a L big toe. Worried that she might have gout because she had not had an injury or trauma. She had recently eating \"oodles of noodles\" which was something she doesn't usually eat and wonders if that might have triggered her    Wants to lose weight, was doing keto but it was really high fat and it wasn't working      Medications, allergies, PMH, PSH, SOCH, [employer] [facility] reviewed and updated per routine protocol, see chart for review and changes if not noted here. ROS  A 12 point review of systems was negative except as noted here or in the HPI.     Objective:   Vital Signs: (As obtained by patient/caregiver at home)  Patient-Reported Vitals 8/10/2020   Patient-Reported Weight unable to obtain   Patient-Reported Height -   Patient-Reported Temperature -   Patient-Reported Systolic  -   Patient-Reported LMP -        [INSTRUCTIONS:  \"[x]\" Indicates a positive item  \"[]\" Indicates a negative item  -- DELETE ALL ITEMS NOT EXAMINED]    Constitutional: [x] Appears well-developed and well-nourished [x] No apparent distress      [] Abnormal -     Mental status: [x] Alert and awake  [x] Oriented to person/place/time [x] Able to follow commands    [] Abnormal -     Eyes:   EOM    [x]  Normal    [] Abnormal -   Sclera  [x]  Normal    [] Abnormal -          Discharge [x]  None visible   [] Abnormal -     HENT: [x] Normocephalic, atraumatic  [] Abnormal -   [x] Mouth/Throat: Mucous membranes are moist    External Ears [x] Normal  [] Abnormal -    Neck: [x] No visualized mass [] Abnormal -     Pulmonary/Chest: [x] Respiratory effort normal   [x] No visualized signs of difficulty breathing or respiratory distress        [] Abnormal -      Musculoskeletal:   [x] Normal gait with no signs of ataxia         [x] Normal range of motion of neck        [] Abnormal -     Neurological:        [x] No Facial Asymmetry (Cranial nerve 7 motor function) (limited exam due to video visit)          [x] No gaze palsy        [] Abnormal -          Skin:        [x] No significant exanthematous lesions or discoloration noted on facial skin         [] Abnormal -            Psychiatric:       [x] Normal Affect [] Abnormal -        [x] No Hallucinations    Other pertinent observable physical exam findings:trache in place, breathing comfortably    We discussed the expected course, resolution and complications of the diagnosis(es) in detail. Medication risks, benefits, costs, interactions, and alternatives were discussed as indicated. I advised her to contact the office if her condition worsens, changes or fails to improve as anticipated. She expressed understanding with the diagnosis(es) and plan. Selene Villa is a 45 y.o. female who was evaluated by a video visit encounter for concerns as above. Patient identification was verified prior to start of the visit. A caregiver was present when appropriate. Due to this being a TeleHealth encounter (During [facility]-72 public health emergency), evaluation of the following organ systems was limited: Vitals/Constitutional/EENT/Resp/CV/GI//MS/Neuro/Skin/Heme-Lymph-Imm. Pursuant to the emergency declaration under the [facility], 1135 waiver authority and the [employer] and [employer] General Act, this Virtual  Visit was conducted, with patient's (and/or legal guardian's) consent, to reduce the patient's risk of exposure to COVID-19 and provide necessary medical care. Services were provided through a video synchronous discussion virtually to substitute for in-person clinic visit. Patient and provider were located at their individual homes.       Eli Guillermo MD  [facility]  09/03/20 1:11 PM     Portions of this note may have been populated using smart dictation software and may have \"sounds-like\" errors present.

## 2020-09-03 NOTE — TELEPHONE ENCOUNTER
----- Message from Alexandria Issa MD sent at 9/3/2020  1:24 PM EDT -----  Regarding: schedule  End of December, have labs before return visit  Pls call or message patient tos curly her prediabetes and bp follow up  Prefer in office so we can monitor vitals

## 2020-09-03 NOTE — TELEPHONE ENCOUNTER
Called and spoke with pt, and she has been advised and states understanding of this and agrees with plan. Pt has been scheduled for 12/18/20.

## 2020-10-26 ENCOUNTER — VIRTUAL VISIT (OUTPATIENT)
Dept: FAMILY MEDICINE CLINIC | Age: 40
End: 2020-10-26

## 2020-10-26 DIAGNOSIS — Z91.199 NO-SHOW FOR APPOINTMENT: Primary | ICD-10-CM

## 2020-10-26 NOTE — PROGRESS NOTES
Chief Complaint   Patient presents with    Nail Problem     Big toe bilateral.    Toe Pain     Left foot. 1. Have you been to the ER, urgent care clinic since your last visit? Hospitalized since your last visit? No    2. Have you seen or consulted any other health care providers outside of the [address] [place] [address] since your last visit? Include any pap smears or colon screening.  No

## 2020-12-03 ENCOUNTER — VIRTUAL VISIT (OUTPATIENT)
Dept: FAMILY MEDICINE CLINIC | Age: 40
End: 2020-12-03
Payer: MEDICARE

## 2020-12-03 DIAGNOSIS — M25.472 LEFT ANKLE SWELLING: ICD-10-CM

## 2020-12-03 DIAGNOSIS — E66.01 MORBID OBESITY (HCC): ICD-10-CM

## 2020-12-03 DIAGNOSIS — Z86.2 HISTORY OF ANEMIA: ICD-10-CM

## 2020-12-03 DIAGNOSIS — R73.03 PREDIABETES: Primary | ICD-10-CM

## 2020-12-03 DIAGNOSIS — E87.6 HYPOKALEMIA: ICD-10-CM

## 2020-12-03 DIAGNOSIS — I10 ESSENTIAL HYPERTENSION: ICD-10-CM

## 2020-12-03 DIAGNOSIS — F32.2 DEPRESSION, MAJOR, SINGLE EPISODE, SEVERE (HCC): ICD-10-CM

## 2020-12-03 PROCEDURE — G8427 DOCREV CUR MEDS BY ELIG CLIN: HCPCS | Performed by: FAMILY MEDICINE

## 2020-12-03 PROCEDURE — G9717 DOC PT DX DEP/BP F/U NT REQ: HCPCS | Performed by: FAMILY MEDICINE

## 2020-12-03 PROCEDURE — 99214 OFFICE O/P EST MOD 30 MIN: CPT | Performed by: FAMILY MEDICINE

## 2020-12-03 RX ORDER — FUROSEMIDE 20 MG/1
TABLET ORAL
Qty: 30 TAB | Refills: 1 | Status: SHIPPED | OUTPATIENT
Start: 2020-12-03 | End: 2020-12-28

## 2020-12-03 RX ORDER — HYDROCHLOROTHIAZIDE 25 MG/1
TABLET ORAL
Qty: 90 TAB | Refills: 1 | Status: SHIPPED | OUTPATIENT
Start: 2020-12-03 | End: 2021-04-26

## 2020-12-03 RX ORDER — AMOXICILLIN AND CLAVULANATE POTASSIUM 875; 125 MG/1; MG/1
TABLET, FILM COATED ORAL
COMMUNITY
Start: 2020-11-19 | End: 2021-04-08

## 2020-12-03 RX ORDER — ALBUTEROL SULFATE 90 UG/1
POWDER, METERED RESPIRATORY (INHALATION)
COMMUNITY
Start: 2020-11-19 | End: 2022-05-06

## 2020-12-03 NOTE — PROGRESS NOTES
Tanner Chavis is a 45 y.o. female who was seen by synchronous (real-time) audio-video technology on 12/3/2020. Consent: Tanner Chavis, who was seen by synchronous (real-time) audio-video technology, and/or her healthcare decision maker, is aware that this patient-initiated, Telehealth encounter on 12/3/2020 is a billable service, with coverage as determined by her insurance carrier. She is aware that she may receive a bill and has provided verbal consent to proceed: Yes. Assessment & Plan:   1. Prediabetes  Due for labs, per orders  - CBC W/O DIFF; Future  - HEMOGLOBIN A1C WITH EAG; Future  - METABOLIC PANEL, COMPREHENSIVE; Future    2. Depression, major, single episode, severe ([address]  Pt reports this is controlled    3. Morbid obesity ([facility]  Pt reports she is working on weight loss through dietary changes    4. Hypokalemia  Due for lab follow up, having some paresthesias, wonder about electrolytes, patient is off of her klor con right now per report    5. Left ankle swelling  Consider lymphedema? Add lasix, she's used this before, will restart on a PRN basis but need to check lytes  - CBC W/O DIFF; Future  - METABOLIC PANEL, COMPREHENSIVE; Future  - furosemide (LASIX) 20 mg tablet; Take as needed or directed by physician for swelling  Dispense: 30 Tab; Refill: 1    6. History of anemia  Due for recheck  - CBC W/O DIFF; Future    7. Essential hypertension  Not at goal, will increase hctz from 12.5 to 25 b/c pt reports bp not controlled, need to have her in the office for eval of this further at some point  - hydroCHLOROthiazide (HYDRODIURIL) 25 mg tablet; TAKE 1 TABLET BY MOUTH EVERY DAY  Dispense: 90 Tab; Refill: 1          Pt was counseled on risks, benefits and alternatives of treatment options. All questions were asked and answered and the patient was agreeable with the treatment plan as outlined.       Subjective:   Tanner Chavis is a 45 y.o. female who was seen for Ankle Pain (L)      Pt reports that her L ankle keeps \"blowing up\"  When the patient sits up in the living room with her feet down then her leg swells up  For a few weeks it stayed up for a few days and it was puffy  She thinks her salt intake is low, she and her mom are about a month into better eating habits, she's lost weight but she can't weigh herself on her home scale, she feels that her back doesn't hurt as much as it was before    Her toes have a change in sensation, she finds it to be annoying, she notices it when she's walking, sitting, laying. It is not painful, her toes are not getting \"Fat\" or \"losing color\"    HTN: patient reports stable on medications. No chest pain, sob, headache, blurred vision, leg swelling, diaphoresis, falls. Compliant with medications as prescribed. Is not regularly checking blood pressures at home but when she was reports range was elevated, in spite of meds. No significant hyper or hypotensive episodes that the patient reports today. Medications, allergies, PMH, PSH, SOCH, [address] reviewed and updated per routine protocol, see chart for review and changes if not noted here. ROS  A 12 point review of systems was negative except as noted here or in the HPI.     Objective:   Vital Signs: (As obtained by patient/caregiver at home)  Patient-Reported Vitals 12/3/2020   Patient-Reported Weight 425lb   Patient-Reported Height -   Patient-Reported Temperature -   Patient-Reported Systolic  -   Patient-Reported LMP -        [INSTRUCTIONS:  \"[x]\" Indicates a positive item  \"[]\" Indicates a negative item  -- DELETE ALL ITEMS NOT EXAMINED]    Constitutional: [x] Appears well-developed and well-nourished [x] No apparent distress      [] Abnormal -     Mental status: [x] Alert and awake  [x] Oriented to person/place/time [x] Able to follow commands    [] Abnormal -     Eyes:   EOM    [x]  Normal    [] Abnormal -   Sclera  [x]  Normal    [] Abnormal -          Discharge [x]  None visible   [] Abnormal - HENT: [x] Normocephalic, atraumatic  [] Abnormal -   [x] Mouth/Throat: Mucous membranes are moist    External Ears [x] Normal  [] Abnormal -    Neck: [x] No visualized mass [] Abnormal -     Pulmonary/Chest: [x] Respiratory effort normal   [x] No visualized signs of difficulty breathing or respiratory distress        [] Abnormal -      Musculoskeletal:   [x] Normal gait with no signs of ataxia         [x] Normal range of motion of neck        [] Abnormal -     Neurological:        [x] No Facial Asymmetry (Cranial nerve 7 motor function) (limited exam due to video visit)          [x] No gaze palsy        [] Abnormal -          Skin:        [x] No significant exanthematous lesions or discoloration noted on facial skin         [] Abnormal -            Psychiatric:       [x] Normal Affect [] Abnormal -        [x] No Hallucinations    Other pertinent observable physical exam findings: obese, trache noted in place    We discussed the expected course, resolution and complications of the diagnosis(es) in detail. Medication risks, benefits, costs, interactions, and alternatives were discussed as indicated. I advised her to contact the office if her condition worsens, changes or fails to improve as anticipated. She expressed understanding with the diagnosis(es) and plan. Ariana Garner is a 45 y.o. female who was evaluated by a video visit encounter for concerns as above. Patient identification was verified prior to start of the visit. A caregiver was present when appropriate. Due to this being a TeleHealth encounter (During JZDHB-01 public health emergency), evaluation of the following organ systems was limited: Vitals/Constitutional/EENT/Resp/CV/GI//MS/Neuro/Skin/Heme-Lymph-Imm.   Pursuant to the emergency declaration under the [facility] waiver authority and the [employer] and [employer] Act, this Virtual  Visit was conducted, with patient's (and/or legal guardian's) consent, to reduce the patient's risk of exposure to COVID-19 and provide necessary medical care. Services were provided through a video synchronous discussion virtually to substitute for in-person clinic visit. Patient and provider were located at their individual homes. Mandy Hurst MD  [facility]  12/03/20 11:40 AM     Portions of this note may have been populated using smart dictation software and may have \"sounds-like\" errors present.

## 2020-12-03 NOTE — PROGRESS NOTES
Chief Complaint   Patient presents with    Ankle Pain     L   1. Have you been to the ER, urgent care clinic since your last visit? Hospitalized since your last visit? 11/2020  [facility] ER                                                                  2. Have you seen or consulted any other health care providers outside of the [address] [place] [address] since your last visit? Include any pap smears or colon screening.  No

## 2020-12-16 ENCOUNTER — TELEPHONE (OUTPATIENT)
Dept: FAMILY MEDICINE CLINIC | Age: 40
End: 2020-12-16

## 2020-12-16 DIAGNOSIS — I10 ESSENTIAL HYPERTENSION: ICD-10-CM

## 2020-12-16 DIAGNOSIS — R73.03 PREDIABETES: Primary | ICD-10-CM

## 2020-12-16 DIAGNOSIS — E87.6 HYPOKALEMIA: ICD-10-CM

## 2020-12-16 DIAGNOSIS — R73.03 PREDIABETES: ICD-10-CM

## 2020-12-16 NOTE — TELEPHONE ENCOUNTER
Pt asking if Dr Nena Nunn would change lab orders so she can get them at [facility] and please mail to her.

## 2020-12-26 DIAGNOSIS — M25.472 LEFT ANKLE SWELLING: ICD-10-CM

## 2020-12-28 RX ORDER — FUROSEMIDE 20 MG/1
TABLET ORAL
Qty: 30 TAB | Refills: 1 | Status: SHIPPED | OUTPATIENT
Start: 2020-12-28 | End: 2021-02-01

## 2021-02-01 DIAGNOSIS — M25.472 LEFT ANKLE SWELLING: ICD-10-CM

## 2021-02-01 RX ORDER — FUROSEMIDE 20 MG/1
TABLET ORAL
Qty: 30 TAB | Refills: 1 | Status: SHIPPED | OUTPATIENT
Start: 2021-02-01 | End: 2021-04-30 | Stop reason: SDUPTHER

## 2021-02-10 ENCOUNTER — TELEPHONE (OUTPATIENT)
Dept: FAMILY MEDICINE CLINIC | Age: 41
End: 2021-02-10

## 2021-02-10 NOTE — TELEPHONE ENCOUNTER
ON CALL PHYSICIAN NOTE  Delayed documentation  Time of call: Ken 55 PM 2/9/21    Recd a call from patient regarding concern she might need to go to the ER  She reports she's been fasting for 3 wk. By this she means no meat, no sugar, some fluids and vegetables. She took some vinegar as part of this and since then has been experiencing some diarrhea. It is slowing down at the time of the call, initially was frequent and brown, then progressed to watery. She is comfortable overall. She has no other systemic signs of infection  She is going to work over the next 12 hours on drinking fluids (water, broth, gatorade or pedialyte may be consumed but should water down) and eating a bland diet if hungry. She will reach out to my office in the AM and I or Dr Nadja Borjas can see her if necessary. No necessity at the time of call for her to go into ED but we will discuss if there is a change and she was understanding of alarm precautions.     Tera Jane [facility] Hospital[facility] Leni Yoon [facility]  02/10/21 7:59 AM

## 2021-03-15 ENCOUNTER — TELEPHONE (OUTPATIENT)
Dept: FAMILY MEDICINE CLINIC | Age: 41
End: 2021-03-15

## 2021-03-15 NOTE — TELEPHONE ENCOUNTER
----- Message from [employer] sent at 3/15/2021  4:32 PM EDT -----  Regarding: Referral  Contact: [phone]  Referral    Caller (first and last name if not the patient or from practice): NA    Caller's relationship to patient (if not from a practice): Self    Name of caller (if calling from a practice): NA    Patient insurance Type    Name of practice: [facility]    Specialist's title, first, and last name: Dr. Darrel Sherman    Specialist Type: Optometrist     Office Phone [phone]     Fax number: [phone]    Date and time of appointment: 03/26/21 @ 09:15 AM.    Reason for appointment: Vision changes. Details to clarify the request:  Patient advising needing referral sent to office prior to upcoming appointment with medical records in case.        [employer]

## 2021-03-15 NOTE — TELEPHONE ENCOUNTER
Unable to process referral on 3/15/21 due to being unable to verify insurance coverage with [facility]. Will contact pt for updated insurance information to process referral prior to appointment on 3/26/21.  Danyell

## 2021-03-18 ENCOUNTER — VIRTUAL VISIT (OUTPATIENT)
Dept: FAMILY MEDICINE CLINIC | Age: 41
End: 2021-03-18
Payer: MEDICARE

## 2021-03-18 DIAGNOSIS — S91.114A LACERATION OF LESSER TOE OF RIGHT FOOT WITHOUT FOREIGN BODY PRESENT OR DAMAGE TO NAIL, INITIAL ENCOUNTER: ICD-10-CM

## 2021-03-18 DIAGNOSIS — S90.221A CONTUSION OF LESSER TOE OF RIGHT FOOT WITH DAMAGE TO NAIL, INITIAL ENCOUNTER: ICD-10-CM

## 2021-03-18 DIAGNOSIS — L08.9 TOE INFECTION: Primary | ICD-10-CM

## 2021-03-18 PROCEDURE — G9717 DOC PT DX DEP/BP F/U NT REQ: HCPCS | Performed by: FAMILY MEDICINE

## 2021-03-18 PROCEDURE — 99213 OFFICE O/P EST LOW 20 MIN: CPT | Performed by: FAMILY MEDICINE

## 2021-03-18 PROCEDURE — G8417 CALC BMI ABV UP PARAM F/U: HCPCS | Performed by: FAMILY MEDICINE

## 2021-03-18 PROCEDURE — G8427 DOCREV CUR MEDS BY ELIG CLIN: HCPCS | Performed by: FAMILY MEDICINE

## 2021-03-18 RX ORDER — DOXYCYCLINE 100 MG/1
100 TABLET ORAL 2 TIMES DAILY
Qty: 20 TAB | Refills: 0 | Status: SHIPPED | OUTPATIENT
Start: 2021-03-18 | End: 2021-03-28

## 2021-03-18 NOTE — PROGRESS NOTES
Gareth Angelucci is a 45 y.o. female who was seen by synchronous (real-time) audio-video technology on 3/18/2021. Assessment & Plan:   Diagnoses and all orders for this visit:    1. Toe infection  -     XR 2ND TOE RT MIN 2 V; Future  -     XR 3RD TOE RT MIN 2 V; Future  -     doxycycline (ADOXA) 100 mg tablet; Take 1 Tab by mouth two (2) times a day for 10 days. Do not take vitamins or iron when taking this medication    2. Laceration of lesser toe of right foot without foreign body present or damage to nail, initial encounter  -     XR 2ND TOE RT MIN 2 V; Future  -     XR 3RD TOE RT MIN 2 V; Future    3. Contusion of lesser toe of right foot with damage to nail, initial encounter  -     XR 2ND TOE RT MIN 2 V; Future  -     XR 3RD TOE RT MIN 2 V; Future        Need to consider osteomyelitis  Doxycycline  Toe x-rays    Follow-up and Dispositions    · Return if not better in 3 days or if the toe is draining pus. Reviewed plan of care. Patient has provided input and agrees with goals. CPT Codes 94261-56718 for Established Patients may apply to this Telehealth Visit      Subjective:   Gareth Angelucci was seen for Toe Injury (Right 2nd & 3rd toe injury. Hit on the stove over the weekend. Draining fluid. 4/10)      Patient presents with: Toe Injury: Right 2nd & 3rd toe injury. Hit on the stove over the weekend. Draining fluid. 4/10    The third toe is oozing clear fluid. She took some leftover amoxicillin this am.        Review of Systems   Musculoskeletal:        There is swelling in between the toes and on the third toe. Skin:        The toes are not red or warm. No bruising. Neurological: Negative for sensory change. Objective:     Physical Exam  Constitutional:       General: She is not in acute distress. Appearance: Normal appearance. Skin:     Comments: Distal right third toe red and swollen. Superficial, linear break in the skin over the medial portion. Neurological:      Mental Status: She is alert and oriented to person, place, and time. Due to this being a TeleHealth evaluation, many elements of the physical examination are unable to be assessed. We discussed the expected course, resolution and complications of the diagnosis(es) in detail. Medication risks, benefits, costs, interactions, and alternatives were discussed as indicated. I advised her to contact the office if her condition worsens, changes or fails to improve as anticipated. She expressed understanding with the diagnosis(es) and plan. Pursuant to the emergency declaration under the [address] [place] [address], [facility] waiver authority and the [employer] and [employer] Act, this Virtual  Visit was conducted, with patient's consent, to reduce the patient's risk of exposure to COVID-19 and provide continuity of care for an established patient. Services were provided through a video synchronous discussion virtually to substitute for in-person clinic visit.     Kaleb Arzate MD

## 2021-03-27 RX ORDER — METOPROLOL TARTRATE 25 MG/1
TABLET, FILM COATED ORAL
Qty: 180 TAB | Refills: 2 | Status: SHIPPED | OUTPATIENT
Start: 2021-03-27 | End: 2022-01-03

## 2021-04-08 ENCOUNTER — VIRTUAL VISIT (OUTPATIENT)
Dept: FAMILY MEDICINE CLINIC | Age: 41
End: 2021-04-08
Payer: MEDICARE

## 2021-04-08 DIAGNOSIS — E87.6 HYPOKALEMIA: ICD-10-CM

## 2021-04-08 DIAGNOSIS — I10 ESSENTIAL HYPERTENSION: ICD-10-CM

## 2021-04-08 DIAGNOSIS — Z93.0 TRACHEOSTOMY PRESENT (HCC): ICD-10-CM

## 2021-04-08 DIAGNOSIS — E66.01 MORBID OBESITY (HCC): ICD-10-CM

## 2021-04-08 DIAGNOSIS — Z28.21 COVID-19 VACCINE SERIES DECLINED: ICD-10-CM

## 2021-04-08 DIAGNOSIS — Z28.310 COVID-19 VACCINE SERIES DECLINED: ICD-10-CM

## 2021-04-08 DIAGNOSIS — R73.01 IFG (IMPAIRED FASTING GLUCOSE): Primary | ICD-10-CM

## 2021-04-08 DIAGNOSIS — Z11.59 ENCOUNTER FOR HEPATITIS C SCREENING TEST FOR LOW RISK PATIENT: ICD-10-CM

## 2021-04-08 PROCEDURE — 99442 PR PHYS/QHP TELEPHONE EVALUATION 11-20 MIN: CPT | Performed by: FAMILY MEDICINE

## 2021-04-08 NOTE — PROGRESS NOTES
Charlene Rowland is a 45 y.o. female    Chief Complaint   Patient presents with    Follow-up     prediabetes      [phone]  Pt would like a telephone call only    Health Maintenance Due   Topic Date Due    Hepatitis C Screening  Never done    COVID-19 Vaccine (1) Never done    Medicare Yearly Exam  05/05/2021       There were no vitals taken for this visit. 3 most recent PHQ Screens 3/18/2021   Little interest or pleasure in doing things Not at all   Feeling down, depressed, irritable, or hopeless Not at all   Total Score PHQ 2 0   Trouble falling or staying asleep, or sleeping too much -   Feeling tired or having little energy -   Poor appetite, weight loss, or overeating -   Feeling bad about yourself - or that you are a failure or have let yourself or your family down -   Trouble concentrating on things such as school, work, reading, or watching TV -   Moving or speaking so slowly that other people could have noticed; or the opposite being so fidgety that others notice -   Thoughts of being better off dead, or hurting yourself in some way -   PHQ 9 Score -   How difficult have these problems made it for you to do your work, take care of your home and get along with others -       Fall Risk Assessment, last 12 mths 11/16/2017   Able to walk? Yes   Fall in past 12 months? No       Abuse Screening Questionnaire 5/4/2020   Do you ever feel afraid of your partner? N   Are you in a relationship with someone who physically or mentally threatens you? N   Is it safe for you to go home? Y         1. Have you been to the ER, urgent care clinic since your last visit? Hospitalized since your last visit?no    2. Have you seen or consulted any other health care providers outside of the [address] [place] [address] since your last visit? Include any pap smears or colon screening.  no

## 2021-04-08 NOTE — PROGRESS NOTES
Jami Salazar is a 45 y.o. female, evaluated via audio-only technology on 4/8/2021 for Follow-up (prediabetes)  . Assessment & Plan:   Diagnoses and all orders for this visit:    1. IFG (impaired fasting glucose)  -     HEMOGLOBIN A1C WITH EAG; Future  -     CBC W/O DIFF; Future  -     METABOLIC PANEL, COMPREHENSIVE; Future  -     MICROALBUMIN, UR, RAND W/ MICROALB/CREAT RATIO; Future    2. Tracheostomy present ([facility]    3. Hypokalemia  -     METABOLIC PANEL, COMPREHENSIVE; Future    4. Essential hypertension  -     CBC W/O DIFF; Future  -     METABOLIC PANEL, COMPREHENSIVE; Future  -     MICROALBUMIN, UR, RAND W/ MICROALB/CREAT RATIO; Future  -     LIPID PANEL; Future    5. Morbid obesity ([facility]    6. Encounter for hepatitis C screening test for low risk patient  -     HEPATITIS C AB; Future    7. COVID-19 vaccine series declined      Labs per orders  Counseling on anticipatory guidance  Counseling on safety of prescribing (need labs to continue to do so)  Counseling on healthy lifestyle  Discussed covid vaccine  Routine screening per orders  12  Subjective:     Pt reports she is doing ok  Declines covid vaccine due to personal preference  Hx of prediabetes  Hx of htn  Hx of hypokalemia  Hx of LE edema  Overdue for labs, will make a plan to get them done  Prior to Admission medications    Medication Sig Start Date End Date Taking?  Authorizing Provider   metoprolol tartrate (LOPRESSOR) 25 mg tablet TAKE 1 TABLET BY MOUTH TWICE A DAY 3/27/21  Yes Dylon Leigh MD   furosemide (LASIX) 20 mg tablet TAKE AS NEEDED OR DIRECTED BY PHYSICIAN FOR SWELLING 2/1/21  Yes Brayden Henriquez MD   ProAir RespiClick 90 mcg/actuation breath activated inhaler INHALE 2 PUFFS BY MOUTH EVERY 6 HOURS AS NEEDED FOR COUGH/SOB/WHEEZING 11/19/20  Yes Provider, Historical   hydroCHLOROthiazide (HYDRODIURIL) 25 mg tablet TAKE 1 TABLET BY MOUTH EVERY DAY 12/3/20  Yes Brayden Henriquez MD   multivitamin, tx-iron-ca-min (THERA-M w/ IRON) 9 mg iron-400 mcg tab tablet Take 1 Tab by mouth daily. Yes Provider, Historical   aspirin delayed-release 325 mg tablet Take  by mouth every six (6) hours as needed for Pain.    Yes Provider, Historical   amoxicillin-clavulanate (AUGMENTIN) 875-125 mg per tablet TAKE 1 TABLET BY MOUTH TWICE A DAY 20  Provider, Historical     Patient Active Problem List   Diagnosis Code    Morbid obesity ([facility] E66.01    MATEUSZ on CPAP G47.33, Z99.89    Tracheostomy present ([address] Z93.0    Former heavy tobacco smoker Z87.891    MEGHAN (generalized anxiety disorder) F41.1    Prediabetes R73.03    Depression, major, single episode, severe ([address] F32.2    IFG (impaired fasting glucose) R73.01    Palpitations R00.2     Past Medical History:   Diagnosis Date    Depression, major, single episode, severe ([address] 2017    Morbid obesity ([facility] 2016    Racing heart beat     Routine Papanicolaou smear 2019    Negative ; HPV Negative     Sleep apnea     Tracheostomy dependence ([facility]     for severe sleep apnea      Past Surgical History:   Procedure Laterality Date    HX GYN      removal of benign ovarian cyst - per pt done in OR, under anesthesia, but states had no incisions    HX TRACHEOSTOMY  13    Due to sleep apnea     Family History   Problem Relation Age of Onset    Diabetes Mother     Hypertension Mother     Heart Disease Maternal Grandmother     No Known Problems Father     Breast Cancer Maternal Aunt         Great Aunt - ?breast ?bone    Cancer Other         great aunt - not sure what type of cancer     Social History     Tobacco Use    Smoking status: Former Smoker     Packs/day: 2.00     Years: 10.00     Pack years: 20.00     Quit date:      Years since quittin.2    Smokeless tobacco: Never Used   Substance Use Topics    Alcohol use: No     Alcohol/week: 0.0 standard drinks       ROS  A 12 point review of systems was negative except as noted here or in the HPI.    Patient-Reported Vitals 4/8/2021   Patient-Reported Weight 425lb   Patient-Reported Height -   Patient-Reported Temperature -   Patient-Reported Systolic  -   Patient-Reported LMP -        Reji Montiel, who was evaluated through a patient-initiated, synchronous (real-time) audio only encounter, and/or her healthcare decision maker, is aware that it is a billable service, with coverage as determined by her insurance carrier. She provided verbal consent to proceed: Yes. She has not had a related appointment within my department in the past 7 days or scheduled within the next 24 hours.       Total Time: minutes: 11-20 minutes    Al Licea MD

## 2021-04-26 DIAGNOSIS — I10 ESSENTIAL HYPERTENSION: ICD-10-CM

## 2021-04-26 RX ORDER — HYDROCHLOROTHIAZIDE 25 MG/1
TABLET ORAL
Qty: 90 TAB | Refills: 1 | Status: SHIPPED | OUTPATIENT
Start: 2021-04-26 | End: 2021-08-23

## 2021-04-27 DIAGNOSIS — L08.9 TOE INFECTION: ICD-10-CM

## 2021-04-30 DIAGNOSIS — M25.472 LEFT ANKLE SWELLING: ICD-10-CM

## 2021-04-30 RX ORDER — DOXYCYCLINE 100 MG/1
100 TABLET ORAL 2 TIMES DAILY
Qty: 20 TAB | Refills: 0 | OUTPATIENT
Start: 2021-04-30 | End: 2021-05-10

## 2021-04-30 RX ORDER — FUROSEMIDE 20 MG/1
TABLET ORAL
Qty: 30 TAB | Refills: 1 | Status: SHIPPED | OUTPATIENT
Start: 2021-04-30 | End: 2021-05-24

## 2021-05-03 ENCOUNTER — TELEPHONE (OUTPATIENT)
Dept: CARDIOLOGY CLINIC | Age: 41
End: 2021-05-03

## 2021-05-03 NOTE — TELEPHONE ENCOUNTER
Patient is requesting a follow up appointment with Dr. Laron Fine. Patient was last seen in 5/2019.  Thanks    Phone: [phone]

## 2021-05-03 NOTE — TELEPHONE ENCOUNTER
Received call from patient, ID verified using two patient identifiers. Patient states she was using her pulse oximeter at home and noticed that her heart rate was irregular. She states her heart rate was anywhere from 69 to 80's and jumping all over the place. She has no symptoms, denies feeling SOB or irregular heart beats. She says she's felt her pulse in her neck and it feels regular. She did state that she hadn't taken her Metoprolol this morning because her heart rate was in the 60's and she was concerned it would drop it too low. Advised her that sometime a pulse oximeter is not the most accurate in recording her heart rate. Discussed that patient hasn't seen Dr. Forrest Ba since 2019. She would like a appointment for follow up and to possibly discuss insertion of ILR again. Patient scheduled and advised that if she starts to develop symptoms of fast or irregular heart rates, shortness of breath, etc she should proceed to the emergency room. Patient verbalized understanding and will call with any other questions.       Future Appointments   Date Time Provider Lynette Cardona   5/21/2021  2:20 PM Cindy Kwan MD [facility]

## 2021-05-22 DIAGNOSIS — M25.472 LEFT ANKLE SWELLING: ICD-10-CM

## 2021-05-24 RX ORDER — FUROSEMIDE 20 MG/1
TABLET ORAL
Qty: 30 TABLET | Refills: 1 | Status: SHIPPED | OUTPATIENT
Start: 2021-05-24 | End: 2021-06-28

## 2021-06-04 ENCOUNTER — HOSPITAL ENCOUNTER (EMERGENCY)
Age: 41
Discharge: LEFT BEFORE DISCHARGE | End: 2021-06-04
Attending: EMERGENCY MEDICINE
Payer: MEDICARE

## 2021-06-04 VITALS
DIASTOLIC BLOOD PRESSURE: 94 MMHG | SYSTOLIC BLOOD PRESSURE: 137 MMHG | HEART RATE: 80 BPM | OXYGEN SATURATION: 97 % | HEIGHT: 67 IN | TEMPERATURE: 97.5 F | RESPIRATION RATE: 16 BRPM | BODY MASS INDEX: 66.56 KG/M2

## 2021-06-04 PROCEDURE — 75810000275 HC EMERGENCY DEPT VISIT NO LEVEL OF CARE

## 2021-06-04 PROCEDURE — 93005 ELECTROCARDIOGRAM TRACING: CPT

## 2021-06-04 NOTE — ED TRIAGE NOTES
Pt arrives to ED with complaints of tingling and \"pins and needles\" feeling to top of head, chest, and left arm and finger tips that started around 930 this morning that lasted around 40 minutes and has since subsided. Pt has no further complaints at this time.

## 2021-06-05 ENCOUNTER — HOSPITAL ENCOUNTER (EMERGENCY)
Age: 41
Discharge: HOME | End: 2021-06-05
Attending: STUDENT IN AN ORGANIZED HEALTH CARE EDUCATION/TRAINING PROGRAM
Payer: MEDICARE

## 2021-06-05 VITALS
HEART RATE: 86 BPM | TEMPERATURE: 98.6 F | WEIGHT: 293 LBS | RESPIRATION RATE: 20 BRPM | DIASTOLIC BLOOD PRESSURE: 64 MMHG | SYSTOLIC BLOOD PRESSURE: 108 MMHG | BODY MASS INDEX: 45.99 KG/M2 | OXYGEN SATURATION: 98 % | HEIGHT: 67 IN

## 2021-06-05 DIAGNOSIS — R20.2 TINGLING OF SKIN: Primary | ICD-10-CM

## 2021-06-05 LAB
ALBUMIN SERPL-MCNC: 3.2 G/DL (ref 3.5–5)
ALBUMIN/GLOB SERPL: 0.8 {RATIO} (ref 1.1–2.2)
ALP SERPL-CCNC: 65 U/L (ref 45–117)
ALT SERPL-CCNC: 28 U/L (ref 12–78)
ANION GAP SERPL CALC-SCNC: 3 MMOL/L (ref 5–15)
AST SERPL-CCNC: 16 U/L (ref 15–37)
BASOPHILS # BLD: 0.1 K/UL (ref 0–0.1)
BASOPHILS NFR BLD: 1 % (ref 0–1)
BILIRUB SERPL-MCNC: 0.3 MG/DL (ref 0.2–1)
BUN SERPL-MCNC: 10 MG/DL (ref 6–20)
BUN/CREAT SERPL: 16 (ref 12–20)
CALCIUM SERPL-MCNC: 8 MG/DL (ref 8.5–10.1)
CHLORIDE SERPL-SCNC: 105 MMOL/L (ref 97–108)
CO2 SERPL-SCNC: 29 MMOL/L (ref 21–32)
CREAT SERPL-MCNC: 0.63 MG/DL (ref 0.55–1.02)
DIFFERENTIAL METHOD BLD: ABNORMAL
EOSINOPHIL # BLD: 0.3 K/UL (ref 0–0.4)
EOSINOPHIL NFR BLD: 3 % (ref 0–7)
ERYTHROCYTE [DISTWIDTH] IN BLOOD BY AUTOMATED COUNT: 15 % (ref 11.5–14.5)
GLOBULIN SER CALC-MCNC: 4.2 G/DL (ref 2–4)
GLUCOSE SERPL-MCNC: 106 MG/DL (ref 65–100)
HCT VFR BLD AUTO: 34.1 % (ref 35–47)
HGB BLD-MCNC: 10.6 G/DL (ref 11.5–16)
IMM GRANULOCYTES # BLD AUTO: 0 K/UL (ref 0–0.04)
IMM GRANULOCYTES NFR BLD AUTO: 0 % (ref 0–0.5)
LYMPHOCYTES # BLD: 1.4 K/UL (ref 0.8–3.5)
LYMPHOCYTES NFR BLD: 18 % (ref 12–49)
MCH RBC QN AUTO: 26.7 PG (ref 26–34)
MCHC RBC AUTO-ENTMCNC: 31.1 G/DL (ref 30–36.5)
MCV RBC AUTO: 85.9 FL (ref 80–99)
MONOCYTES # BLD: 0.6 K/UL (ref 0–1)
MONOCYTES NFR BLD: 7 % (ref 5–13)
NEUTS SEG # BLD: 5.7 K/UL (ref 1.8–8)
NEUTS SEG NFR BLD: 71 % (ref 32–75)
NRBC # BLD: 0 K/UL (ref 0–0.01)
NRBC BLD-RTO: 0 PER 100 WBC
PLATELET # BLD AUTO: 375 K/UL (ref 150–400)
PMV BLD AUTO: 8.5 FL (ref 8.9–12.9)
POTASSIUM SERPL-SCNC: 3.6 MMOL/L (ref 3.5–5.1)
PROT SERPL-MCNC: 7.4 G/DL (ref 6.4–8.2)
RBC # BLD AUTO: 3.97 M/UL (ref 3.8–5.2)
SODIUM SERPL-SCNC: 137 MMOL/L (ref 136–145)
TROPONIN I SERPL-MCNC: <0.05 NG/ML
WBC # BLD AUTO: 8.1 K/UL (ref 3.6–11)

## 2021-06-05 PROCEDURE — 84484 ASSAY OF TROPONIN QUANT: CPT

## 2021-06-05 PROCEDURE — 80053 COMPREHEN METABOLIC PANEL: CPT

## 2021-06-05 PROCEDURE — 85025 COMPLETE CBC W/AUTO DIFF WBC: CPT

## 2021-06-05 PROCEDURE — 99285 EMERGENCY DEPT VISIT HI MDM: CPT

## 2021-06-05 PROCEDURE — 93005 ELECTROCARDIOGRAM TRACING: CPT

## 2021-06-05 PROCEDURE — 36415 COLL VENOUS BLD VENIPUNCTURE: CPT

## 2021-06-05 NOTE — ED PROVIDER NOTES
Patient is a 58-year-old female presented emergency department for tingling sensation to the top of her head, chest, abdomen, left arm. Patient says that she came to the emergency department yesterday for symptoms however due to the wait time patient left was not seen or evaluated. Patient says that her symptoms had completely resolved however she had spoken to some family members and they advised her to return for further evaluation. Patient today denies any symptoms including headache, photophobia, blurry vision, upper or lower extremity weakness numbness or tingling. Patient has a past medical history of severe sleep apnea requiring her to be trach dependent.            Past Medical History:   Diagnosis Date    Depression, major, single episode, severe ([address] 2017    Morbid [facility] ([facility] [address] 2016    Racing heart beat     Routine Papanicolaou smear 2019    Negative ; HPV Negative     Sleep apnea     Tracheostomy dependence ([facility] [address]     for severe sleep apnea        Past Surgical History:   Procedure Laterality Date    HX GYN      removal of benign ovarian cyst - per pt done in OR, under anesthesia, but states had no incisions    HX TRACHEOSTOMY  13    Due to sleep apnea         Family History:   Problem Relation Age of Onset    Diabetes Mother     Hypertension Mother     Heart Disease Maternal Grandmother     No Known Problems Father     Breast Cancer Maternal Aunt         Great Aunt - ?breast ?bone    Cancer Other         great aunt - not sure what type of cancer       Social History     Socioeconomic History    Marital status: SINGLE     Spouse name: Not on file    Number of children: Not on file    Years of education: Not on file    Highest education level: Not on file   Occupational History    Not on file   Tobacco Use    Smoking status: Former Smoker     Packs/day: 2.00     Years: 10.00     Pack years: 20.00     Quit date:      Years since quittin.4    Smokeless tobacco: Never Used   Vaping Use    Vaping Use: Never used   Substance and Sexual Activity    Alcohol use: No     Alcohol/week: 0.0 standard drinks    Drug use: No     Comment: Smoked Marijuana in 5631-1006, but not current user. -vr    Sexual activity: Not Currently   Other Topics Concern    Not on file   Social History Narrative    Not on file     Social Determinants of Health     Financial Resource Strain:     Difficulty of Paying Living Expenses:    Food Insecurity:     Worried About Running Out of Food in the Last Year:     [address] in the Last Year:    Transportation Needs:     Lack of Transportation (Medical):  Lack of Transportation (Non-Medical):    Physical Activity:     Days of Exercise per Week:     Minutes of Exercise per Session:    Stress:     Feeling of Stress :    Social Connections:     Frequency of Communication with Friends and Family:     Frequency of Social Gatherings with Friends and Family:     Attends [employer] Services:     Active Member of Clubs or Organizations:     Attends Club or Organization Meetings:     Marital Status:    Intimate Partner Violence:     Fear of Current or Ex-Partner:     Emotionally Abused:     Physically Abused:     Sexually Abused: ALLERGIES: Sulfa (sulfonamide antibiotics)    Review of Systems   Respiratory: Negative for chest tightness. Cardiovascular: Negative for chest pain. Gastrointestinal: Negative for abdominal pain, nausea and vomiting. Neurological: Negative for dizziness, tremors, seizures, syncope, facial asymmetry, speech difficulty, weakness, light-headedness, numbness and headaches. All other systems reviewed and are negative. Vitals:    06/05/21 0842   BP: (!) 144/94   Pulse: 86   Resp: 20   Temp: 98.6 °F (37 °C)   SpO2: 98%   Weight: (!) 206.2 kg (454 lb 8 oz)   Height: 5' 7\" (1.702 m)            Physical Exam  Vitals and nursing note reviewed.    Constitutional:       Appearance: Normal appearance. She is morbidly obese. HENT:      Head: Normocephalic and atraumatic. Eyes:      Comments: Proptosis bilateral   Neck:      Comments: Tracheostomy stoma clean dry and intact no swelling or erythema appreciated. Cardiovascular:      Rate and Rhythm: Normal rate and regular rhythm. Pulses: Normal pulses. Heart sounds: Normal heart sounds. Pulmonary:      Effort: Pulmonary effort is normal.   Musculoskeletal:         General: Normal range of motion. Cervical back: Normal range of motion and neck supple. Skin:     General: Skin is warm and dry. Neurological:      General: No focal deficit present. Mental Status: She is alert and oriented to person, place, and time. Psychiatric:         Mood and Affect: Mood normal.          MDM  Number of Diagnoses or Management Options  Diagnosis management comments: A/P: Migraine headache, headache, electrolyte abnormality. 23-year-old female presenting with sensation of tingling to her head, chest, abdomen and left arm symptoms have completely resolved patient is neurologically intact no focal findings on exam.  Will obtain EKG, baseline labs.        Amount and/or Complexity of Data Reviewed  Clinical lab tests: ordered and reviewed    Patient Progress  Patient progress: stable         Procedures

## 2021-06-05 NOTE — ED TRIAGE NOTES
Pt arrives with the c.c. of \"tingling pin and needles sensation\" that starts at the top of head the travels down into chest and arms; pt reports started at 0900 yesterday has resolved at this time, pt came in yesterday but left without being seen.

## 2021-06-06 LAB
ATRIAL RATE: 84 BPM
ATRIAL RATE: 85 BPM
CALCULATED P AXIS, ECG09: 47 DEGREES
CALCULATED P AXIS, ECG09: 52 DEGREES
CALCULATED R AXIS, ECG10: -21 DEGREES
CALCULATED R AXIS, ECG10: -29 DEGREES
CALCULATED T AXIS, ECG11: 2 DEGREES
CALCULATED T AXIS, ECG11: 23 DEGREES
DIAGNOSIS, 93000: NORMAL
DIAGNOSIS, 93000: NORMAL
P-R INTERVAL, ECG05: 130 MS
P-R INTERVAL, ECG05: 132 MS
Q-T INTERVAL, ECG07: 378 MS
Q-T INTERVAL, ECG07: 380 MS
QRS DURATION, ECG06: 94 MS
QRS DURATION, ECG06: 96 MS
QTC CALCULATION (BEZET), ECG08: 449 MS
QTC CALCULATION (BEZET), ECG08: 449 MS
VENTRICULAR RATE, ECG03: 84 BPM
VENTRICULAR RATE, ECG03: 85 BPM

## 2021-06-27 DIAGNOSIS — M25.472 LEFT ANKLE SWELLING: ICD-10-CM

## 2021-06-28 RX ORDER — FUROSEMIDE 20 MG/1
TABLET ORAL
Qty: 30 TABLET | Refills: 1 | Status: SHIPPED | OUTPATIENT
Start: 2021-06-28 | End: 2021-07-20

## 2021-07-19 ENCOUNTER — VIRTUAL VISIT (OUTPATIENT)
Dept: FAMILY MEDICINE CLINIC | Age: 41
End: 2021-07-19
Payer: MEDICARE

## 2021-07-19 DIAGNOSIS — Z71.1 PHYSICALLY WELL BUT WORRIED: Primary | ICD-10-CM

## 2021-07-19 PROCEDURE — G8427 DOCREV CUR MEDS BY ELIG CLIN: HCPCS | Performed by: FAMILY MEDICINE

## 2021-07-19 PROCEDURE — G9717 DOC PT DX DEP/BP F/U NT REQ: HCPCS | Performed by: FAMILY MEDICINE

## 2021-07-19 PROCEDURE — 99213 OFFICE O/P EST LOW 20 MIN: CPT | Performed by: FAMILY MEDICINE

## 2021-07-19 PROCEDURE — G8756 NO BP MEASURE DOC: HCPCS | Performed by: FAMILY MEDICINE

## 2021-07-19 RX ORDER — POTASSIUM CHLORIDE 1500 MG/1
TABLET, EXTENDED RELEASE ORAL
COMMUNITY
Start: 2021-05-27 | End: 2021-10-11 | Stop reason: ALTCHOICE

## 2021-07-19 NOTE — PROGRESS NOTES
Courtney Alfaro is a 37 y.o. female who was seen by synchronous (real-time) audio-video technology on 7/19/2021. Consent: Courtney Alfaro, who was seen by synchronous (real-time) audio-video technology, and/or her healthcare decision maker, is aware that this patient-initiated, Telehealth encounter on 7/19/2021 is a billable service, with coverage as determined by her insurance carrier. She is aware that she may receive a bill and has provided verbal consent to proceed: Yes. Assessment & Plan:   1. Physically well but worried  Was worried for abd cramping but since has resolved, believes related to bowel movement/bowel gas  No need for work up now  If returns and does not self resolve please check back with me  Call with concners          Pt was counseled on risks, benefits and alternatives of treatment options. All questions were asked and answered and the patient was agreeable with the treatment plan as outlined. Subjective:   Courtney Alfaro is a 37 y.o. female who was seen for Abdominal Pain (Cramping)      Started with cramps and abdominal pain, she thinks it might have been related to bowel movements, started taking a supplement (tumeric)  Her potassium had changed she thinks    Drinking fluids--water 17 oz water bottles, maybe 2-3 a day    Last bowel movement, yesterday and then the last one was the day before that    Yesterday adjusted her trache and and had trouble with it, went to er bc could not stop coughing    Medications, allergies, PMH, PSH, SOCH, [facility] reviewed and updated per routine protocol, see chart for review and changes if not noted here. ROS  A 12 point review of systems was negative except as noted here or in the HPI.     Objective:   Vital Signs: (As obtained by patient/caregiver at home)  Patient-Reported Vitals 4/8/2021   Patient-Reported Weight 425lb   Patient-Reported Height -   Patient-Reported Temperature -   Patient-Reported Systolic  -   Patient-Reported LMP - [INSTRUCTIONS:  \"[x]\" Indicates a positive item  \"[]\" Indicates a negative item  -- DELETE ALL ITEMS NOT EXAMINED]    Constitutional: [x] Appears well-developed and well-nourished [x] No apparent distress      [] Abnormal -     Mental status: [x] Alert and awake  [x] Oriented to person/place/time [x] Able to follow commands    [] Abnormal -     Eyes:   EOM    [x]  Normal    [] Abnormal -   Sclera  [x]  Normal    [] Abnormal -          Discharge [x]  None visible   [] Abnormal -     HENT: [x] Normocephalic, atraumatic  [] Abnormal -   [x] Mouth/Throat: Mucous membranes are moist    External Ears [x] Normal  [] Abnormal -    Neck: [x] No visualized mass [] Abnormal -     Pulmonary/Chest: [x] Respiratory effort normal   [x] No visualized signs of difficulty breathing or respiratory distress        [] Abnormal -      Musculoskeletal:   [] Normal gait with no signs of ataxia         [x] Normal range of motion of neck        [] Abnormal -     Neurological:        [x] No Facial Asymmetry (Cranial nerve 7 motor function) (limited exam due to video visit)          [x] No gaze palsy        [] Abnormal -          Skin:        [x] No significant exanthematous lesions or discoloration noted on facial skin         [] Abnormal -            Psychiatric:       [x] Normal Affect [] Abnormal -        [x] No Hallucinations    Other pertinent observable physical exam findings:trach in place    We discussed the expected course, resolution and complications of the diagnosis(es) in detail. Medication risks, benefits, costs, interactions, and alternatives were discussed as indicated. I advised her to contact the office if her condition worsens, changes or fails to improve as anticipated. She expressed understanding with the diagnosis(es) and plan. Gera Andres is a 37 y.o. female who was evaluated by a video visit encounter for concerns as above. Patient identification was verified prior to start of the visit.  A caregiver was present when appropriate. Due to this being a TeleHealth encounter (During DRIBV-97 public health emergency), evaluation of the following organ systems was limited: Vitals/Constitutional/EENT/Resp/CV/GI//MS/Neuro/Skin/Heme-Lymph-Imm. Pursuant to the emergency declaration under the [address] [place] [address] waiver authority and the [employer] Act, this Virtual  Visit was conducted, with patient's (and/or legal guardian's) consent, to reduce the patient's risk of exposure to COVID-19 and provide necessary medical care. Services were provided through a video synchronous discussion virtually to substitute for in-person clinic visit. Patient and provider were located at their individual homes. Marimar Cartwright MD  [facility]  07/19/21 3:20 PM     Portions of this note may have been populated using smart dictation software and may have \"sounds-like\" errors present.

## 2021-07-19 NOTE — PROGRESS NOTES
Chief Complaint   Patient presents with    Abdominal Pain     Cramping     1. Have you been to the ER, urgent care clinic since your last visit? Hospitalized since your last visit? Yes 07/18/2021 [facility], Trach issue. 2. Have you seen or consulted any other health care providers outside of the [address] [place] [address] since your last visit? Include any pap smears or colon screening.   No

## 2021-07-20 DIAGNOSIS — M25.472 LEFT ANKLE SWELLING: ICD-10-CM

## 2021-07-20 RX ORDER — FUROSEMIDE 20 MG/1
TABLET ORAL
Qty: 30 TABLET | Refills: 1 | Status: SHIPPED | OUTPATIENT
Start: 2021-07-20 | End: 2021-08-23

## 2021-08-06 ENCOUNTER — OFFICE VISIT (OUTPATIENT)
Dept: FAMILY MEDICINE CLINIC | Age: 41
End: 2021-08-06
Payer: MEDICARE

## 2021-08-06 VITALS
RESPIRATION RATE: 20 BRPM | BODY MASS INDEX: 45.99 KG/M2 | WEIGHT: 293 LBS | DIASTOLIC BLOOD PRESSURE: 90 MMHG | SYSTOLIC BLOOD PRESSURE: 136 MMHG | OXYGEN SATURATION: 96 % | TEMPERATURE: 97.2 F | HEART RATE: 83 BPM | HEIGHT: 67 IN

## 2021-08-06 DIAGNOSIS — M25.472 LEFT ANKLE SWELLING: Primary | ICD-10-CM

## 2021-08-06 DIAGNOSIS — Z28.310 COVID-19 VACCINE SERIES DECLINED: ICD-10-CM

## 2021-08-06 DIAGNOSIS — Z28.21 COVID-19 VACCINE SERIES DECLINED: ICD-10-CM

## 2021-08-06 PROCEDURE — G8427 DOCREV CUR MEDS BY ELIG CLIN: HCPCS | Performed by: FAMILY MEDICINE

## 2021-08-06 PROCEDURE — G8417 CALC BMI ABV UP PARAM F/U: HCPCS | Performed by: FAMILY MEDICINE

## 2021-08-06 PROCEDURE — G8755 DIAS BP > OR = 90: HCPCS | Performed by: FAMILY MEDICINE

## 2021-08-06 PROCEDURE — G8752 SYS BP LESS 140: HCPCS | Performed by: FAMILY MEDICINE

## 2021-08-06 PROCEDURE — 99213 OFFICE O/P EST LOW 20 MIN: CPT | Performed by: FAMILY MEDICINE

## 2021-08-06 PROCEDURE — G9717 DOC PT DX DEP/BP F/U NT REQ: HCPCS | Performed by: FAMILY MEDICINE

## 2021-08-06 NOTE — PROGRESS NOTES
Family Medicine Follow-Up Progress Note  Patient: Nikki Mar  1980, 37 y.o., female  Encounter Date: 8/6/2021    ASSESSMENT & PLAN    ICD-10-CM ICD-9-CM    1. Left ankle swelling  M25.472 719.07    2. COVID-19 vaccine series declined  Z28.21 V64.06        Orders Placed This Encounter    TURMERIC PO     Sig: Take  by mouth. Low sodium diet  C/w lasix  Elevation  Increase fluids  If persistent return for eval  Recommend patient f/u for acute care if alarm signs as discussed    Counseling re: covid vaccine today    CHIEF COMPLAINT  Chief Complaint   Patient presents with    Weight Gain    Foot Swelling     left foot x2 days ( pt does state she has been eating out alot )       SUBJECTIVE  Nikki Mar is a 37 y.o. female presenting today for asymmetrical foot swelling  Reports no calf swelling  Reports no shortness of breath  Reports no chest pain  Reports increased salt intake d/t eating out recently--this is a recent change but has happened before  Is on lasix    Has not gotten her vaccine for covid yet  She is trached and hypertensive, these are some of her risk factors    ROS  Review of Systems  A 12 point review of systems was negative except as noted here or in the HPI. OBJECTIVE  Visit Vitals  BP (!) 136/90   Pulse 83   Temp 97.2 °F (36.2 °C) (Temporal)   Resp 20   Ht 5' 7\" (1.702 m)   Wt (!) 445 lb (201.9 kg)   SpO2 96%   BMI 69.70 kg/m²       Physical Exam  Vitals and nursing note reviewed. Constitutional:       General: She is not in acute distress. Appearance: Normal appearance. She is obese. She is not ill-appearing, toxic-appearing or diaphoretic. HENT:      Head: Normocephalic and atraumatic. Right Ear: External ear normal.      Left Ear: External ear normal.      Mouth/Throat:      Mouth: Mucous membranes are moist.   Eyes:      General: No scleral icterus. Right eye: No discharge. Left eye: No discharge.       Comments: eom grossly intact   Neck: Comments: No visible neck masses , ROM appears normal from visual inspection, trach in place  Pulmonary:      Effort: Pulmonary effort is normal. No respiratory distress. Musculoskeletal:      Left lower leg: Edema (non pitting top of foot, calf diameter measured and bilaterally equal) present. Skin:     Comments: Visible skin is without jaundice, bruising, lesion, pallor, erythema or rash except as otherwise noted   Neurological:      General: No focal deficit present. Mental Status: She is alert and oriented to person, place, and time. Psychiatric:         Mood and Affect: Mood normal.         Behavior: Behavior normal.         Thought Content: Thought content normal.         Judgment: Judgment normal.         No results found for any visits on 21.     HISTORICAL  Reviewed and updated today, and as noted below:    Past Medical History:   Diagnosis Date    Depression, major, single episode, severe ([address] 2017    Morbid obesity ([facility] [address] 2016    Racing heart beat     Routine Papanicolaou smear 2019    Negative ; HPV Negative     Sleep apnea     Tracheostomy dependence ([facility] [address]     for severe sleep apnea      Past Surgical History:   Procedure Laterality Date    HX GYN      removal of benign ovarian cyst - per pt done in OR, under anesthesia, but states had no incisions    HX TRACHEOSTOMY  13    Due to sleep apnea     Family History   Problem Relation Age of Onset    Diabetes Mother     Hypertension Mother     Heart Disease Maternal Grandmother     No Known Problems Father     Breast Cancer Maternal Aunt         Great Aunt - ?breast ?bone    Cancer Other         great aunt - not sure what type of cancer     Social History     Tobacco Use   Smoking Status Former Smoker    Packs/day: 2.00    Years: 10.00    Pack years: 20.00    Quit date:     Years since quittin.6   Smokeless Tobacco Never Used     Social History     Socioeconomic History    Marital status: SINGLE     Spouse name: Not on file    Number of children: Not on file    Years of education: Not on file    Highest education level: Not on file   Tobacco Use    Smoking status: Former Smoker     Packs/day: 2.00     Years: 10.00     Pack years: 20.00     Quit date:      Years since quittin.6    Smokeless tobacco: Never Used   Vaping Use    Vaping Use: Never used   Substance and Sexual Activity    Alcohol use: No     Alcohol/week: 0.0 standard drinks    Drug use: No     Comment: Smoked Marijuana in [id number], but not current user. -vr    Sexual activity: Not Currently     Social Determinants of Health     Financial Resource Strain:     Difficulty of Paying Living Expenses:    Food Insecurity:     Worried About Running Out of Food in the Last Year:     [address] in the Last Year:    Transportation Needs:     Lack of Transportation (Medical):      Lack of Transportation (Non-Medical):    Physical Activity:     Days of Exercise per Week:     Minutes of Exercise per Session:    Stress:     Feeling of Stress :    Social Connections:     Frequency of Communication with Friends and Family:     Frequency of Social Gatherings with Friends and Family:     Attends Catholic Services:     Active Member of Clubs or Organizations:     Attends Club or Organization Meetings:     Marital Status:      Allergies   Allergen Reactions    Sulfa (Sulfonamide Antibiotics) Rash       LAB REVIEW  Lab Results   Component Value Date/Time    Sodium 137 2021 11:15 AM    Potassium 3.6 2021 11:15 AM    Chloride 105 2021 11:15 AM    CO2 29 2021 11:15 AM    Anion gap 3 (L) 2021 11:15 AM    Glucose 106 (H) 2021 11:15 AM    BUN 10 2021 11:15 AM    Creatinine 0.63 2021 11:15 AM    BUN/Creatinine ratio 16 2021 11:15 AM    GFR est AA >60 2021 11:15 AM    GFR est non-AA >60 2021 11:15 AM    Calcium 8.0 (L) 2021 11:15 AM    Bilirubin, total 0.3 06/05/2021 11:15 AM    Alk. phosphatase 65 06/05/2021 11:15 AM    Protein, total 7.4 06/05/2021 11:15 AM    Albumin 3.2 (L) 06/05/2021 11:15 AM    Globulin 4.2 (H) 06/05/2021 11:15 AM    A-G Ratio 0.8 (L) 06/05/2021 11:15 AM    ALT (SGPT) 28 06/05/2021 11:15 AM     Lab Results   Component Value Date/Time    WBC 8.1 06/05/2021 11:15 AM    Hemoglobin (POC) 12.6 04/03/2016 06:46 AM    HGB 10.6 (L) 06/05/2021 11:15 AM    Hematocrit (POC) 37 04/03/2016 06:46 AM    HCT 34.1 (L) 06/05/2021 11:15 AM    PLATELET 844 69/47/0585 11:15 AM    MCV 85.9 06/05/2021 11:15 AM     Lab Results   Component Value Date/Time    Hemoglobin A1c 6.3 (H) 08/19/2020 11:39 AM     Lab Results   Component Value Date/Time    Cholesterol, total 151 03/07/2018 12:43 PM    HDL Cholesterol 53 03/07/2018 12:43 PM    LDL, calculated 87 03/07/2018 12:43 PM    VLDL, calculated 11 03/07/2018 12:43 PM    Triglyceride 55 03/07/2018 12:43 PM           [facility] MD Abdias  [facility]  08/06/21 12:05 PM    Portions of this note may have been populated using smart dictation software and may have \"sounds-like\" errors present. Pt was counseled on risks, benefits and alternatives of treatment options. All questions were asked and answered and the patient was agreeable with the treatment plan as outlined.

## 2021-08-06 NOTE — PROGRESS NOTES
Chief Complaint   Patient presents with    Weight Gain    Foot Swelling     left foot x2 days ( pt does state she has been eating out alot )

## 2021-08-22 DIAGNOSIS — M25.472 LEFT ANKLE SWELLING: ICD-10-CM

## 2021-08-22 DIAGNOSIS — I10 ESSENTIAL HYPERTENSION: ICD-10-CM

## 2021-08-23 RX ORDER — HYDROCHLOROTHIAZIDE 25 MG/1
TABLET ORAL
Qty: 90 TABLET | Refills: 1 | Status: SHIPPED | OUTPATIENT
Start: 2021-08-23 | End: 2022-01-26

## 2021-08-23 RX ORDER — FUROSEMIDE 20 MG/1
TABLET ORAL
Qty: 30 TABLET | Refills: 1 | Status: SHIPPED | OUTPATIENT
Start: 2021-08-23 | End: 2021-09-20

## 2021-08-30 ENCOUNTER — VIRTUAL VISIT (OUTPATIENT)
Dept: FAMILY MEDICINE CLINIC | Age: 41
End: 2021-08-30
Payer: MEDICARE

## 2021-08-30 DIAGNOSIS — M25.472 LEFT ANKLE SWELLING: ICD-10-CM

## 2021-08-30 DIAGNOSIS — I10 ESSENTIAL HYPERTENSION: ICD-10-CM

## 2021-08-30 DIAGNOSIS — R43.9 SMELL DISTURBANCE: Primary | ICD-10-CM

## 2021-08-30 PROCEDURE — G9717 DOC PT DX DEP/BP F/U NT REQ: HCPCS | Performed by: FAMILY MEDICINE

## 2021-08-30 PROCEDURE — G8427 DOCREV CUR MEDS BY ELIG CLIN: HCPCS | Performed by: FAMILY MEDICINE

## 2021-08-30 PROCEDURE — 99213 OFFICE O/P EST LOW 20 MIN: CPT | Performed by: FAMILY MEDICINE

## 2021-08-30 PROCEDURE — G8756 NO BP MEASURE DOC: HCPCS | Performed by: FAMILY MEDICINE

## 2021-08-30 NOTE — PROGRESS NOTES
Charles Dawson is a 37 y.o. female who was seen by synchronous (real-time) audio-video technology on 8/30/2021. Consent: Charles Dawson, who was seen by synchronous (real-time) audio-video technology, and/or her healthcare decision maker, is aware that this patient-initiated, Telehealth encounter on 8/30/2021 is a billable service, with coverage as determined by her insurance carrier. She is aware that she may receive a bill and has provided verbal consent to proceed: Yes. Assessment & Plan:       ICD-10-CM ICD-9-CM    1. Smell disturbance  R43.9 781.1    2. Left ankle swelling  M25.472 719.07    3. Essential hypertension  I10 401.9      Improvement in swelling, will continue monitoring  Smell disturbance self resolved  Recommend again for covid vaccine, pt was receptive  F/u prn2        Pt was counseled on risks, benefits and alternatives of treatment options. All questions were asked and answered and the patient was agreeable with the treatment plan as outlined. Subjective:   Charles Dawson is a 37 y.o. female who was seen for Leg Swelling (Ammonia smell in nose. Denies urinary smell or dysuria.)      Leg swelling is getting better--she reports she is standing a lot    Changes in sense of smell, this happened yesterday--she was tested for covid and it was negative  She reports she can smell ammonia--  Was using some chemicals yesterday and it was a persistent smell, now, by the afternoon, it is fortunately gone      Medications, allergies, PMH, PSH, SOCH, [facility] reviewed and updated per routine protocol, see chart for review and changes if not noted here. ROS  A 12 point review of systems was negative except as noted here or in the HPI.     Objective:   Vital Signs: (As obtained by patient/caregiver at home)  Patient-Reported Vitals 8/30/2021   Patient-Reported Weight 445lb   Patient-Reported Height -   Patient-Reported Pulse 91   Patient-Reported Temperature -   Patient-Reported Systolic  - Patient-Reported LMP -        [INSTRUCTIONS:  \"[x]\" Indicates a positive item  \"[]\" Indicates a negative item  -- DELETE ALL ITEMS NOT EXAMINED]    Constitutional: [x] Appears well-developed and well-nourished [x] No apparent distress      [] Abnormal -     Mental status: [x] Alert and awake  [x] Oriented to person/place/time [x] Able to follow commands    [] Abnormal -     Eyes:   EOM    [x]  Normal    [] Abnormal -   Sclera  [x]  Normal    [] Abnormal -          Discharge [x]  None visible   [] Abnormal -     HENT: [x] Normocephalic, atraumatic  [] Abnormal -   [x] Mouth/Throat: Mucous membranes are moist    External Ears [x] Normal  [] Abnormal -    Neck: [x] No visualized mass [] Abnormal -     Pulmonary/Chest: [x] Respiratory effort normal   [x] No visualized signs of difficulty breathing or respiratory distress        [] Abnormal -      Musculoskeletal:   [] Normal gait with no signs of ataxia         [x] Normal range of motion of neck        [] Abnormal -     Neurological:        [x] No Facial Asymmetry (Cranial nerve 7 motor function) (limited exam due to video visit)          [x] No gaze palsy        [] Abnormal -          Skin:        [x] No significant exanthematous lesions or discoloration noted on facial skin         [] Abnormal -            Psychiatric:       [x] Normal Affect [] Abnormal -        [x] No Hallucinations    Other pertinent observable physical exam findings:trach    We discussed the expected course, resolution and complications of the diagnosis(es) in detail. Medication risks, benefits, costs, interactions, and alternatives were discussed as indicated. I advised her to contact the office if her condition worsens, changes or fails to improve as anticipated. She expressed understanding with the diagnosis(es) and plan. Isamar Craig is a 37 y.o. female who was evaluated by a video visit encounter for concerns as above.  Patient identification was verified prior to start of the visit. A caregiver was present when appropriate. Due to this being a TeleHealth encounter (During [facility] public health emergency), evaluation of the following organ systems was limited: Vitals/Constitutional/EENT/Resp/CV/GI//MS/Neuro/Skin/Heme-Lymph-Imm. Pursuant to the emergency declaration under the [address] [place] [address] waiver authority and the [employer] Act, this Virtual  Visit was conducted, with patient's (and/or legal guardian's) consent, to reduce the patient's risk of exposure to COVID-19 and provide necessary medical care. Services were provided through a video synchronous discussion virtually to substitute for in-person clinic visit. Patient and provider were located at their individual homes. Serena Rojas MD  [facility]  08/30/21 3:43 PM     Portions of this note may have been populated using smart dictation software and may have \"sounds-like\" errors present.

## 2021-08-30 NOTE — PROGRESS NOTES
Chief Complaint   Patient presents with    Leg Swelling     Ammonia smell in nose. Denies urinary smell or dysuria. 1. Have you been to the ER, urgent care clinic since your last visit? Hospitalized since your last visit? No    2. Have you seen or consulted any other health care providers outside of the [address] [place] [address] since your last visit? Include any pap smears or colon screening.  No

## 2021-09-09 ENCOUNTER — TELEPHONE (OUTPATIENT)
Dept: FAMILY MEDICINE CLINIC | Age: 41
End: 2021-09-09

## 2021-09-09 NOTE — TELEPHONE ENCOUNTER
Called and spoke with pt, and she has been advised and states understanding of the need to go to ER to be seen today for evaluation. Pt agrees with plan.

## 2021-09-16 ENCOUNTER — TELEPHONE (OUTPATIENT)
Dept: FAMILY MEDICINE CLINIC | Age: 41
End: 2021-09-16

## 2021-09-17 NOTE — TELEPHONE ENCOUNTER
Please call patient and encourage her to make/keep an appt in person with me or preferably with her cardiologist

## 2021-09-17 NOTE — TELEPHONE ENCOUNTER
Called and spoke with pt. She was already scheduled for 09/22/2021, and will like to keep that appointment. Pt states she will call to schedule with cardiology.

## 2021-09-18 DIAGNOSIS — M25.472 LEFT ANKLE SWELLING: ICD-10-CM

## 2021-09-20 RX ORDER — FUROSEMIDE 20 MG/1
TABLET ORAL
Qty: 30 TABLET | Refills: 1 | Status: SHIPPED | OUTPATIENT
Start: 2021-09-20 | End: 2021-10-15

## 2021-09-22 ENCOUNTER — VIRTUAL VISIT (OUTPATIENT)
Dept: FAMILY MEDICINE CLINIC | Age: 41
End: 2021-09-22
Payer: MEDICARE

## 2021-09-22 ENCOUNTER — TELEPHONE (OUTPATIENT)
Dept: FAMILY MEDICINE CLINIC | Age: 41
End: 2021-09-22

## 2021-09-22 DIAGNOSIS — R00.0 TACHYCARDIA: ICD-10-CM

## 2021-09-22 DIAGNOSIS — R73.01 IFG (IMPAIRED FASTING GLUCOSE): ICD-10-CM

## 2021-09-22 DIAGNOSIS — I10 ESSENTIAL HYPERTENSION: Primary | ICD-10-CM

## 2021-09-22 DIAGNOSIS — R00.2 PALPITATIONS: ICD-10-CM

## 2021-09-22 DIAGNOSIS — E66.01 MORBID OBESITY (HCC): ICD-10-CM

## 2021-09-22 PROCEDURE — 99214 OFFICE O/P EST MOD 30 MIN: CPT | Performed by: FAMILY MEDICINE

## 2021-09-22 PROCEDURE — G9717 DOC PT DX DEP/BP F/U NT REQ: HCPCS | Performed by: FAMILY MEDICINE

## 2021-09-22 PROCEDURE — G8756 NO BP MEASURE DOC: HCPCS | Performed by: FAMILY MEDICINE

## 2021-09-22 PROCEDURE — G8427 DOCREV CUR MEDS BY ELIG CLIN: HCPCS | Performed by: FAMILY MEDICINE

## 2021-09-22 NOTE — PROGRESS NOTES
Chief Complaint   Patient presents with    Irregular Heart Beat     1. Have you been to the ER, urgent care clinic since your last visit? Hospitalized since your last visit? No     2. Have you seen or consulted any other health care providers outside of the [address] [place] [address] since your last visit? Include any pap smears or colon screening.  No

## 2021-09-22 NOTE — PROGRESS NOTES
Quintin Rondon is a 37 y.o. female who was seen by synchronous (real-time) audio-video technology on 9/22/2021. Consent: Quintin Rondon, who was seen by synchronous (real-time) audio-video technology, and/or her healthcare decision maker, is aware that this patient-initiated, Telehealth encounter on 9/22/2021 is a billable service, with coverage as determined by her insurance carrier. She is aware that she may receive a bill and has provided verbal consent to proceed: Yes. Assessment & Plan:       ICD-10-CM ICD-9-CM    1. Essential hypertension  I10 401.9    2. IFG (impaired fasting glucose)  R73.01 790.21    3. Morbid obesity ([address]  E66.01 278.01    4. Tachycardia  R00.0 785.0    5. Palpitations  R00.2 785.1      Pt is due for labs related to glycemic control, electrolytes, blood count  They were ordered in April  I have re-printed them today and asked our staff to fax them over to the [facility]  She will keep her appt with Dr Joshua Balderrama frequent carb balanced snacks/meals  C/w metoprolol  Stay adequately hydrated        Pt was counseled on risks, benefits and alternatives of treatment options. All questions were asked and answered and the patient was agreeable with the treatment plan as outlined.       Subjective:   Quintin Rondon is a 37 y.o. female who was seen for Irregular Heart Beat      Last time we talked we discussed some numbness on her cheek    Went to store last week and she found that her hr was 112 while she was driving  Then it went to 147, then went to 127 then stayed at 112 or so  She hadn't eated that day--for 3-4 meals she thinks  She felt better when she ate and then she felt like she \"regulated\"    Is on metoprolol, had taken that day--she is hovering 80-90 most days  This morning it's been 70 when she woke up, but the past few weeks its been higher than that    She isn't sure what she weighs    Lab Results   Component Value Date/Time    Hemoglobin A1c 6.3 (H) 08/19/2020 11:39 AM    Hemoglobin A1c 5.9 (H) 03/07/2018 12:43 PM    Hemoglobin A1c 6.1 (H) 04/20/2016 12:24 PM     Lab Results   Component Value Date/Time    Sodium 137 06/05/2021 11:15 AM    Potassium 3.6 06/05/2021 11:15 AM    Chloride 105 06/05/2021 11:15 AM    CO2 29 06/05/2021 11:15 AM    Anion gap 3 (L) 06/05/2021 11:15 AM    Glucose 106 (H) 06/05/2021 11:15 AM    BUN 10 06/05/2021 11:15 AM    Creatinine 0.63 06/05/2021 11:15 AM    BUN/Creatinine ratio 16 06/05/2021 11:15 AM    GFR est AA >60 06/05/2021 11:15 AM    GFR est non-AA >60 06/05/2021 11:15 AM    Calcium 8.0 (L) 06/05/2021 11:15 AM    Bilirubin, total 0.3 06/05/2021 11:15 AM    Alk. phosphatase 65 06/05/2021 11:15 AM    Protein, total 7.4 06/05/2021 11:15 AM    Albumin 3.2 (L) 06/05/2021 11:15 AM    Globulin 4.2 (H) 06/05/2021 11:15 AM    A-G Ratio 0.8 (L) 06/05/2021 11:15 AM    ALT (SGPT) 28 06/05/2021 11:15 AM    AST (SGOT) 16 06/05/2021 11:15 AM       Medications, allergies, PMH, PSH, SOCH, [facility] reviewed and updated per routine protocol, see chart for review and changes if not noted here. ROS  A 12 point review of systems was negative except as noted here or in the HPI.     Objective:   Vital Signs: (As obtained by patient/caregiver at home)  Patient-Reported Vitals 9/22/2021   Patient-Reported Weight 445lb   Patient-Reported Height -   Patient-Reported Pulse 81   Patient-Reported Temperature 98.2   Patient-Reported Systolic  -   Patient-Reported LMP 09/02/2021        [INSTRUCTIONS:  \"[x]\" Indicates a positive item  \"[]\" Indicates a negative item  -- DELETE ALL ITEMS NOT EXAMINED]    Constitutional: [x] Appears well-developed and well-nourished [x] No apparent distress      [] Abnormal -     Mental status: [x] Alert and awake  [x] Oriented to person/place/time [x] Able to follow commands    [] Abnormal -     Eyes:   EOM    [x]  Normal    [] Abnormal -   Sclera  [x]  Normal    [] Abnormal -          Discharge [x]  None visible   [] Abnormal - HENT: [x] Normocephalic, atraumatic  [] Abnormal -   [x] Mouth/Throat: Mucous membranes are moist    External Ears [x] Normal  [] Abnormal -    Neck: [x] No visualized mass [] Abnormal -     Pulmonary/Chest: [x] Respiratory effort normal   [x] No visualized signs of difficulty breathing or respiratory distress        [] Abnormal -      Musculoskeletal:   [] Normal gait with no signs of ataxia         [x] Normal range of motion of neck        [] Abnormal -     Neurological:        [x] No Facial Asymmetry (Cranial nerve 7 motor function) (limited exam due to video visit)          [x] No gaze palsy        [] Abnormal -          Skin:        [x] No significant exanthematous lesions or discoloration noted on facial skin         [] Abnormal -            Psychiatric:       [x] Normal Affect [] Abnormal -        [x] No Hallucinations    Other pertinent observable physical exam findings:seated, trach noted in neck, no distress, non toxic    We discussed the expected course, resolution and complications of the diagnosis(es) in detail. Medication risks, benefits, costs, interactions, and alternatives were discussed as indicated. I advised her to contact the office if her condition worsens, changes or fails to improve as anticipated. She expressed understanding with the diagnosis(es) and plan. Reema Méndez is a 37 y.o. female who was evaluated by a video visit encounter for concerns as above. Patient identification was verified prior to start of the visit. A caregiver was present when appropriate. Due to this being a TeleHealth encounter (During [place] [address] public [facility] emergency), evaluation of the following organ systems was limited: Vitals/Constitutional/EENT/Resp/CV/GI//MS/Neuro/Skin/Heme-Lymph-Imm.   Pursuant to the emergency declaration under the [facility], [address] [place] [address] authority and the [employer] Act, this Virtual Visit was conducted, with patient's (and/or legal guardian's) consent, to reduce the patient's risk of exposure to COVID-19 and provide necessary medical care. Services were provided through a video synchronous discussion virtually to substitute for in-person clinic visit. Patient and provider were located at their individual homes. Dylan Carver MD  [facility] [place]  09/22/21 11:09 AM     Portions of this note may have been populated using smart dictation software and may have \"sounds-like\" errors present.

## 2021-09-22 NOTE — TELEPHONE ENCOUNTER
----- Message from Jazz Cisneros MD sent at 9/22/2021 11:20 AM EDT -----  Regarding: pls fax  Labs just printed, on printer, pt would like to go today or tomorrow  Pls fax and thenc all her when done so she knows she's good to go  [address] [place] [address]

## 2021-10-01 ENCOUNTER — OFFICE VISIT (OUTPATIENT)
Dept: CARDIOLOGY CLINIC | Age: 41
End: 2021-10-01
Payer: MEDICARE

## 2021-10-01 VITALS
WEIGHT: 293 LBS | OXYGEN SATURATION: 98 % | BODY MASS INDEX: 45.99 KG/M2 | HEART RATE: 90 BPM | RESPIRATION RATE: 20 BRPM | DIASTOLIC BLOOD PRESSURE: 84 MMHG | SYSTOLIC BLOOD PRESSURE: 128 MMHG | HEIGHT: 67 IN

## 2021-10-01 DIAGNOSIS — R00.2 PALPITATIONS: Primary | ICD-10-CM

## 2021-10-01 PROCEDURE — G8754 DIAS BP LESS 90: HCPCS | Performed by: INTERNAL MEDICINE

## 2021-10-01 PROCEDURE — 93000 ELECTROCARDIOGRAM COMPLETE: CPT | Performed by: INTERNAL MEDICINE

## 2021-10-01 PROCEDURE — G8427 DOCREV CUR MEDS BY ELIG CLIN: HCPCS | Performed by: INTERNAL MEDICINE

## 2021-10-01 PROCEDURE — G9717 DOC PT DX DEP/BP F/U NT REQ: HCPCS | Performed by: INTERNAL MEDICINE

## 2021-10-01 PROCEDURE — 99215 OFFICE O/P EST HI 40 MIN: CPT | Performed by: INTERNAL MEDICINE

## 2021-10-01 PROCEDURE — G8417 CALC BMI ABV UP PARAM F/U: HCPCS | Performed by: INTERNAL MEDICINE

## 2021-10-01 PROCEDURE — G8752 SYS BP LESS 140: HCPCS | Performed by: INTERNAL MEDICINE

## 2021-10-01 NOTE — PROGRESS NOTES
Room EP 3  Visit Vitals  /84 (BP 1 Location: Left upper arm, BP Patient Position: Sitting)   Pulse 90   Resp 20   Ht 5' 7\" (1.702 m)   Wt (!) 452 lb (205 kg)   SpO2 98%   BMI 70.79 kg/m²       Resting HR was elevated, for the last 2 weeks HR has been lower  Chest pain: no  Shortness of breath: no  Edema: no  Palpitations, Skipped beats, Rapid heartbeat: no  Dizziness: no  Fatigue:no    New diagnosis/Surgeries: no    ER/Hospitalizations: [facility] ED: 6/5/21-numbness     Refills:no

## 2021-10-01 NOTE — PROGRESS NOTES
HISTORY OF PRESENTING ILLNESS      Adina Smart is a 37 y.o. female with MATEUSZ, tracheostomy, obesity who has experienced recurrent highly symptomatic palpitations which had been challenging to document despite monitoring and presentation to ER. She underwent 30 day monitor which demonstrated sinus rhythm as well as long RP tachycardias with rates in the 150's. She reported numerous episodes of chest pain/pressure/palpitations during which her heart rate was normal. She also reported palpitations during episodes of long RP tachycardias. She reported feeling poorly with metoprolol 25 mg BID in that she felt chest pain and felt that her heart rate was too slow. Her dose was lowered to 12.5 mg BID subsequently. She noted her palpitations awakened her from sleep occasionally however overall her palpitation burden was improving and that her chest pain and pressure has resolved.  She continued to have occasional episodes of tachycardia that would last up to 5 minutes.  She reported approximately 2 episodes per month.  Last visit we discussed the option of adding diltiazem 60 mg twice daily to her current regimen however she opted to avoid additional medications and continued her current dose of metoprolol with plan to administer additional doses of metoprolol as needed for occasional episodes of tachycardia. She was hospitalized for a viral syndrome during which she reports her heart rate escalated to the 120s with an increase in palpitations.  Her heart rate subsequently improved and lowered down into the 60s-70s at times.      She again has been experiencing viral symptoms which are improving.  HR's were elevated for 4 weeks but now have improved.          ACTIVE PROBLEM LIST           Patient Active Problem List     Diagnosis Date Noted    Palpitations 07/13/2018    IFG (impaired fasting glucose) 07/10/2018    Depression, [facility], [facility] 11/16/2017    Former heavy tobacco smoker 2016    MEGHAN (generalized anxiety disorder) 2016    Prediabetes 2016    Morbid obesity ([facility] 2016    MATEUSZ on CPAP 2016    Tracheostomy present ([facility] 2016             PAST MEDICAL HISTORY           Past Medical History:   Diagnosis Date    Depression, major, single episode, severe ([address] 2017    Morbid obesity ([facility] 2016    Racing heart beat      Routine Papanicolaou smear 2019     Negative ; HPV Negative     Sleep apnea      Tracheostomy dependence ([facility]       for severe sleep apnea              PAST SURGICAL HISTORY            Past Surgical History:   Procedure Laterality Date    HX GYN        removal of benign ovarian cyst - per pt done in OR, under anesthesia, but states had no incisions    HX TRACHEOSTOMY   13     Due to sleep apnea             ALLERGIES           Allergies   Allergen Reactions    Sulfa (Sulfonamide Antibiotics) Rash            FAMILY HISTORY            Family History   Problem Relation Age of Onset    Diabetes Mother      Hypertension Mother      Heart Disease Maternal Grandmother      No Known Problems Father      Breast Cancer Maternal Aunt           Great Aunt - ?breast ?bone    Cancer Other           great aunt - not sure what type of cancer    negative for cardiac disease         SOCIAL HISTORY      Social History               Socioeconomic History    Marital status: SINGLE       Spouse name: Not on file    Number of children: Not on file    Years of education: Not on file    Highest education level: Not on file   Tobacco Use    Smoking status: Former Smoker       Packs/day: 2.00       Years: 10.00       Pack years: 20.00       Last attempt to quit:        Years since quittin.3    Smokeless tobacco: Never Used   Substance and Sexual Activity    Alcohol use: No       Alcohol/week: 0.0 oz    Drug use: No       Comment: Smoked Marijuana in [id number], but not current user. -vr    Sexual activity: Not Currently               MEDICATIONS           Current Outpatient Medications   Medication Sig    dicyclomine (BENTYL) 10 mg capsule TAKE 1 CAPSULE BY MOUTH THREE TIMES A DAY    chlorpheniramine-dm (CORICIDIN HBP COUGH AND COLD) 4-30 mg tab Take  by mouth.  metoprolol tartrate (LOPRESSOR) 25 mg tablet TAKE 1 TABLET BY MOUTH TWICE A DAY    hydroCHLOROthiazide (MICROZIDE) 12.5 mg capsule Take 12.5 mg by mouth daily.  aspirin (ASPIRIN) 325 mg tablet Take 325 mg by mouth daily.  multivit,calc,mins/iron/folic (ONE-A-DAY WOMENS FORMULA PO) Take 1 Tab by mouth daily.      No current facility-administered medications for this visit.          I have reviewed the nurses notes, vitals, problem list, allergy list, medical history, family, social history and medications.         REVIEW OF SYMPTOMS      General: Pt denies excessive weight gain or loss. Pt is able to conduct ADL's  HEENT: Denies blurred vision, headaches, hearing loss, epistaxis and difficulty swallowing. Respiratory: Denies cough, congestion, shortness of breath, PIZANO, wheezing or stridor. Cardiovascular: Denies precordial pain, palpitations, edema or PND  Gastrointestinal: Denies poor appetite, indigestion, abdominal pain or blood in stool  Genitourinary: Denies hematuria, dysuria, increased urinary frequency  Musculoskeletal: Denies joint pain or swelling from muscles or joints  Neurologic: Denies tremor, paresthesias, headache, or sensory motor disturbance  Psychiatric: Denies confusion, insomnia, depression  Integumentray: Denies rash, itching or ulcers. Hematologic: Denies easy bruising, bleeding         PHYSICAL EXAMINATION      There were no vitals filed for this visit. General: Well developed, in no acute distress. HEENT: No jaundice, oral mucosa moist, no oral ulcers  Neck: Supple, no stiffness, no lymphadenopathy, supple  Heart:  Normal S1/S2 negative S3 or S4.  Regular, no murmur, gallop or rub, no jugular venous distention  Respiratory: Clear bilaterally x 4, no wheezing or rales  Abdomen:   Soft, non-tender, bowel sounds are active.   Extremities:  No edema, normal cap refill, no cyanosis. Musculoskeletal: No clubbing, no deformities  Neuro: A&Ox3, speech clear, gait stable, cooperative, no focal neurologic deficits  Skin: Skin color is normal. No rashes or lesions. Non diaphoretic, moist.  Vascular: 2+ pulses symmetric in all extremities         DIAGNOSTIC DATA      EKG:  Sinus rhythm at 91 bpm         LABORATORY DATA            Lab Results   Component Value Date/Time     WBC 7.4 12/15/2018 03:09 PM     Hemoglobin (POC) 12.6 04/03/2016 06:46 AM     HGB 11.7 12/15/2018 03:09 PM     Hematocrit (POC) 37 04/03/2016 06:46 AM     HCT 36.8 12/15/2018 03:09 PM     PLATELET 330 25/76/9397 03:09 PM     MCV 86.0 12/15/2018 03:09 PM            Lab Results   Component Value Date/Time     Sodium 137 12/15/2018 03:09 PM     Potassium 3.8 12/15/2018 03:09 PM     Chloride 103 12/15/2018 03:09 PM     CO2 27 12/15/2018 03:09 PM     Anion gap 7 12/15/2018 03:09 PM     Glucose 94 12/15/2018 03:09 PM     BUN 8 12/15/2018 03:09 PM     Creatinine 0.80 12/15/2018 03:09 PM     BUN/Creatinine ratio 10 (L) 12/15/2018 03:09 PM     GFR est AA >60 12/15/2018 03:09 PM     GFR est non-AA >60 12/15/2018 03:09 PM     Calcium 8.6 12/15/2018 03:09 PM     Bilirubin, total 0.3 12/15/2018 03:09 PM     AST (SGOT) 20 12/15/2018 03:09 PM     Alk. phosphatase 72 12/15/2018 03:09 PM     Protein, total 8.1 12/15/2018 03:09 PM     Albumin 3.4 (L) 12/15/2018 03:09 PM     Globulin 4.7 (H) 12/15/2018 03:09 PM     A-G Ratio 0.7 (L) 12/15/2018 03:09 PM     ALT (SGPT) 33 12/15/2018 03:09 PM             ASSESSMENT      1. Palpitations   2. Obstructive sleep apnea  3. Tracheostomy    4.  Obesity         PLAN      Monitor for recurrent tachycardia in which case will arrange for a monitor.           Felicity Marie MD  Cardiac Electrophysiology / Cardiology     [place] [address] [address]  [address] [facility], [address] [address] [address], [address], [place]  [phone] / [phone] Fax                                    [phone] / [phone] Fax

## 2021-10-02 LAB
ALBUMIN SERPL-MCNC: 4.4 G/DL (ref 3.8–4.8)
ALBUMIN/CREAT UR: 4 MG/G CREAT (ref 0–29)
ALBUMIN/GLOB SERPL: 1.3 {RATIO} (ref 1.2–2.2)
ALP SERPL-CCNC: 82 IU/L (ref 44–121)
ALT SERPL-CCNC: 36 IU/L (ref 0–32)
AST SERPL-CCNC: 30 IU/L (ref 0–40)
BILIRUB SERPL-MCNC: 0.3 MG/DL (ref 0–1.2)
BUN SERPL-MCNC: 7 MG/DL (ref 6–24)
BUN/CREAT SERPL: 7 (ref 9–23)
CALCIUM SERPL-MCNC: 10 MG/DL (ref 8.7–10.2)
CHLORIDE SERPL-SCNC: 98 MMOL/L (ref 96–106)
CHOLEST SERPL-MCNC: 162 MG/DL (ref 100–199)
CO2 SERPL-SCNC: 27 MMOL/L (ref 20–29)
CREAT SERPL-MCNC: 0.94 MG/DL (ref 0.57–1)
CREAT UR-MCNC: 269.8 MG/DL
ERYTHROCYTE [DISTWIDTH] IN BLOOD BY AUTOMATED COUNT: 14.4 % (ref 11.7–15.4)
EST. AVERAGE GLUCOSE BLD GHB EST-MCNC: 174 MG/DL
GLOBULIN SER CALC-MCNC: 3.5 G/DL (ref 1.5–4.5)
GLUCOSE SERPL-MCNC: 122 MG/DL (ref 65–99)
HBA1C MFR BLD: 7.7 % (ref 4.8–5.6)
HCT VFR BLD AUTO: 39.9 % (ref 34–46.6)
HCV AB S/CO SERPL IA: <0.1 S/CO RATIO (ref 0–0.9)
HDLC SERPL-MCNC: 41 MG/DL
HGB BLD-MCNC: 12.3 G/DL (ref 11.1–15.9)
IMP & REVIEW OF LAB RESULTS: NORMAL
LDLC SERPL CALC-MCNC: 105 MG/DL (ref 0–99)
MCH RBC QN AUTO: 26.1 PG (ref 26.6–33)
MCHC RBC AUTO-ENTMCNC: 30.8 G/DL (ref 31.5–35.7)
MCV RBC AUTO: 85 FL (ref 79–97)
MICROALBUMIN UR-MCNC: 11.4 UG/ML
PLATELET # BLD AUTO: 536 X10E3/UL (ref 150–450)
POTASSIUM SERPL-SCNC: 3.9 MMOL/L (ref 3.5–5.2)
PROT SERPL-MCNC: 7.9 G/DL (ref 6–8.5)
RBC # BLD AUTO: 4.72 X10E6/UL (ref 3.77–5.28)
SODIUM SERPL-SCNC: 137 MMOL/L (ref 134–144)
TRIGL SERPL-MCNC: 85 MG/DL (ref 0–149)
VLDLC SERPL CALC-MCNC: 16 MG/DL (ref 5–40)
WBC # BLD AUTO: 8.5 X10E3/UL (ref 3.4–10.8)

## 2021-10-04 ENCOUNTER — TELEPHONE (OUTPATIENT)
Dept: FAMILY MEDICINE CLINIC | Age: 41
End: 2021-10-04

## 2021-10-04 NOTE — TELEPHONE ENCOUNTER
Pt called to advise Dr. Huff Presume she viewed her lab results on [employer], noted Hgb A1c is 7.7, and scheduled virtual visit for 10/11/21 at 2:15 pm for follow up. Please advise if pt needs to be seen sooner or can send message via [employer] regarding results.  Danyell

## 2021-10-11 ENCOUNTER — VIRTUAL VISIT (OUTPATIENT)
Dept: FAMILY MEDICINE CLINIC | Age: 41
End: 2021-10-11
Payer: MEDICARE

## 2021-10-11 DIAGNOSIS — E11.9 NEW ONSET TYPE 2 DIABETES MELLITUS (HCC): Primary | ICD-10-CM

## 2021-10-11 DIAGNOSIS — I10 ESSENTIAL HYPERTENSION: ICD-10-CM

## 2021-10-11 PROCEDURE — 3051F HG A1C>EQUAL 7.0%<8.0%: CPT | Performed by: FAMILY MEDICINE

## 2021-10-11 PROCEDURE — G8427 DOCREV CUR MEDS BY ELIG CLIN: HCPCS | Performed by: FAMILY MEDICINE

## 2021-10-11 PROCEDURE — 2022F DILAT RTA XM EVC RTNOPTHY: CPT | Performed by: FAMILY MEDICINE

## 2021-10-11 PROCEDURE — G8756 NO BP MEASURE DOC: HCPCS | Performed by: FAMILY MEDICINE

## 2021-10-11 PROCEDURE — 99214 OFFICE O/P EST MOD 30 MIN: CPT | Performed by: FAMILY MEDICINE

## 2021-10-11 PROCEDURE — G9717 DOC PT DX DEP/BP F/U NT REQ: HCPCS | Performed by: FAMILY MEDICINE

## 2021-10-11 RX ORDER — METFORMIN HYDROCHLORIDE 500 MG/1
1000 TABLET, EXTENDED RELEASE ORAL
Qty: 360 TABLET | Refills: 0 | Status: SHIPPED | OUTPATIENT
Start: 2021-10-11 | End: 2022-01-03

## 2021-10-11 NOTE — PROGRESS NOTES
Abiodun Parker is a 37 y.o. female who was seen by synchronous (real-time) audio-video technology on 10/11/2021. Consent: Abiodun Parker, who was seen by synchronous (real-time) audio-video technology, and/or her healthcare decision maker, is aware that this patient-initiated, Telehealth encounter on 10/11/2021 is a billable service, with coverage as determined by her insurance carrier. She is aware that she may receive a bill and has provided verbal consent to proceed: Yes. Assessment & Plan:   1. New onset type 2 diabetes mellitus ([address]  New start metformin  Patient Instructions   METFORMIN START UP DOSING  Week 1 : 1 tab daily with dinner  Week 2 : 2 tabs daily with dinner  Week 3: 1 tab with breakfast and 2 tabs with dinner  Week 4+ 2 tabs twice daily with meals  consider start statin as well  Recheck 2 wk  2. Essential hypertension  bp at goal  Compliant with meds  Seeing cardiology              Pt was counseled on risks, benefits and alternatives of treatment options. All questions were asked and answered and the patient was agreeable with the treatment plan as outlined. Subjective:   Abiodun Parker is a 37 y.o. female who was seen for Labs (fu abnml labs A1c elevated.  )      New onset dm  Lab Results   Component Value Date/Time    Hemoglobin A1c 7.7 (H) 10/01/2021 04:58 PM    Hemoglobin A1c 6.3 (H) 08/19/2020 11:39 AM    Hemoglobin A1c 5.9 (H) 03/07/2018 12:43 PM    Glucose 122 (H) 10/01/2021 04:58 PM    Glucose (POC) 114 (H) 04/03/2016 06:46 AM    Microalb/Creat ratio (ug/mg creat.) 4 10/01/2021 04:58 PM    LDL, calculated 105 (H) 10/01/2021 04:58 PM    LDL, calculated 87 03/07/2018 12:43 PM    Creatinine (POC) 1.0 04/03/2016 06:46 AM    Creatinine 0.94 10/01/2021 04:58 PM     Her hr is leveling off in the 60-80 range now  She has seen cardiology, they thought probably she was sick  HTN: patient reports stable on medications.  No chest pain, sob, headache, blurred vision, leg swelling, diaphoresis, falls. Compliant with medications as prescribed. is checking blood pressures at home and reports range is 120s/80s. No significant hyper or hypotensive episodes that the patient reports today. Medications, allergies, PMH, PSH, SOCH, [facility] reviewed and updated per routine protocol, see chart for review and changes if not noted here. ROS  A 12 point review of systems was negative except as noted here or in the HPI.     Objective:   Vital Signs: (As obtained by patient/caregiver at home)  Patient-Reported Vitals 10/11/2021   Patient-Reported Weight 454lb   Patient-Reported Height -   Patient-Reported Pulse 90   Patient-Reported Temperature 98.5   Patient-Reported SpO2 -   Patient-Reported Systolic  -   Patient-Reported Diastolic -   Patient-Reported LMP -        [INSTRUCTIONS:  \"[x]\" Indicates a positive item  \"[]\" Indicates a negative item  -- DELETE ALL ITEMS NOT EXAMINED]    Constitutional: [x] Appears well-developed and well-nourished [x] No apparent distress      [] Abnormal -     Mental status: [x] Alert and awake  [x] Oriented to person/place/time [x] Able to follow commands    [] Abnormal -     Eyes:   EOM    [x]  Normal    [] Abnormal -   Sclera  [x]  Normal    [] Abnormal -          Discharge [x]  None visible   [] Abnormal -     HENT: [x] Normocephalic, atraumatic  [] Abnormal -   [x] Mouth/Throat: Mucous membranes are moist    External Ears [x] Normal  [] Abnormal -    Neck: [x] No visualized mass [] Abnormal -     Pulmonary/Chest: [x] Respiratory effort normal   [x] No visualized signs of difficulty breathing or respiratory distress        [] Abnormal -      Musculoskeletal:   [] Normal gait with no signs of ataxia         [x] Normal range of motion of neck        [] Abnormal -     Neurological:        [x] No Facial Asymmetry (Cranial nerve 7 motor function) (limited exam due to video visit)          [x] No gaze palsy        [] Abnormal -          Skin:        [x] No significant exanthematous lesions or discoloration noted on facial skin         [] Abnormal -            Psychiatric:       [x] Normal Affect [] Abnormal -        [x] No Hallucinations    Other pertinent observable physical exam findings:ambulatory at home, trache in place    We discussed the expected course, resolution and complications of the diagnosis(es) in detail. Medication risks, benefits, costs, interactions, and alternatives were discussed as indicated. I advised her to contact the office if her condition worsens, changes or fails to improve as anticipated. She expressed understanding with the diagnosis(es) and plan. Nikko Devries is a 37 y.o. female who was evaluated by a video visit encounter for concerns as above. Patient identification was verified prior to start of the visit. A caregiver was present when appropriate. Due to this being a TeleHealth encounter (During [place]-[employer] public health emergency), evaluation of the following organ systems was limited: Vitals/Constitutional/EENT/Resp/CV/GI//MS/Neuro/Skin/Heme-Lymph-Imm. Pursuant to the emergency declaration under the [facility] waiver authority and the [employer] Act, this Virtual  Visit was conducted, with patient's (and/or legal guardian's) consent, to reduce the patient's risk of exposure to COVID-19 and provide necessary medical care. Services were provided through a video synchronous discussion virtually to substitute for in-person clinic visit. Patient and provider were located at their individual homes. Wojciech Reynolds MD  [facility]  10/11/21 2:45 PM     Portions of this note may have been populated using smart dictation software and may have \"sounds-like\" errors present.

## 2021-10-15 DIAGNOSIS — M25.472 LEFT ANKLE SWELLING: ICD-10-CM

## 2021-10-15 RX ORDER — FUROSEMIDE 20 MG/1
TABLET ORAL
Qty: 30 TABLET | Refills: 1 | Status: SHIPPED | OUTPATIENT
Start: 2021-10-15 | End: 2021-11-10

## 2021-10-25 ENCOUNTER — VIRTUAL VISIT (OUTPATIENT)
Dept: FAMILY MEDICINE CLINIC | Age: 41
End: 2021-10-25
Payer: MEDICARE

## 2021-10-25 DIAGNOSIS — E11.9 NEW ONSET TYPE 2 DIABETES MELLITUS (HCC): ICD-10-CM

## 2021-10-25 DIAGNOSIS — Z00.00 MEDICARE ANNUAL WELLNESS VISIT, SUBSEQUENT: Primary | ICD-10-CM

## 2021-10-25 PROCEDURE — 2022F DILAT RTA XM EVC RTNOPTHY: CPT | Performed by: FAMILY MEDICINE

## 2021-10-25 PROCEDURE — G8756 NO BP MEASURE DOC: HCPCS | Performed by: FAMILY MEDICINE

## 2021-10-25 PROCEDURE — 99213 OFFICE O/P EST LOW 20 MIN: CPT | Performed by: FAMILY MEDICINE

## 2021-10-25 PROCEDURE — G8427 DOCREV CUR MEDS BY ELIG CLIN: HCPCS | Performed by: FAMILY MEDICINE

## 2021-10-25 PROCEDURE — G9717 DOC PT DX DEP/BP F/U NT REQ: HCPCS | Performed by: FAMILY MEDICINE

## 2021-10-25 PROCEDURE — G0439 PPPS, SUBSEQ VISIT: HCPCS | Performed by: FAMILY MEDICINE

## 2021-10-25 PROCEDURE — G8417 CALC BMI ABV UP PARAM F/U: HCPCS | Performed by: FAMILY MEDICINE

## 2021-10-25 PROCEDURE — 3051F HG A1C>EQUAL 7.0%<8.0%: CPT | Performed by: FAMILY MEDICINE

## 2021-10-25 NOTE — PROGRESS NOTES
This is the Subsequent Medicare Annual Wellness Exam, performed 12 months or more after the Initial AWV or the last Subsequent AWV    I have reviewed the patient's medical history in detail and updated the computerized patient record. Assessment/Plan   Education and counseling provided:  Are appropriate based on today's review and evaluation    1. Medicare annual wellness visit, subsequent  2. New onset type 2 diabetes mellitus (HCC)  -     HEMOGLOBIN A1C WITH EAG; Future       Depression Risk Factor Screening     3 most recent PHQ Screens 10/25/2021   Little interest or pleasure in doing things Several days   Feeling down, depressed, irritable, or hopeless Not at all   Total Score PHQ 2 1   Trouble falling or staying asleep, or sleeping too much -   Feeling tired or having little energy -   Poor appetite, weight loss, or overeating -   Feeling bad about yourself - or that you are a failure or have let yourself or your family down -   Trouble concentrating on things such as school, work, reading, or watching TV -   Moving or speaking so slowly that other people could have noticed; or the opposite being so fidgety that others notice -   Thoughts of being better off dead, or hurting yourself in some way -   PHQ 9 Score -   How difficult have these problems made it for you to do your work, take care of your home and get along with others -       Alcohol Risk Screen   Do you average more than 1 drink per night or more than 7 drinks a week?: No  On any one occasion in the past three months have you had more than 3 drinks containing alcohol?: No          Functional Ability and Level of Safety   Hearing:  Hearing: Patient reports hearing is good      Activities of Daily Living:   The home contains: grab bars, rugs, poor lighting  Functional ADLs: Patient needs help with self care  Patient needs help with: laundry, housework, hygiene     Ambulation:  Patient ambulates: with mild difficulty  How far the patient can walk with difficulty: I get winded, but I still move around good. I just sit down, bend over, or lean on something. Walking is difficult due to: pain, shortness of breath     Fall Risk:  Fall Risk Assessment, last 12 mths 10/25/2021   Able to walk? Yes   Fall in past 12 months? 1   Do you feel unsteady?  1   Are you worried about falling 1   Number of falls in past 12 months 1     Abuse Screen:  Do you ever feel afraid of your partner?: No  Are you in a relationship with someone who physically or mentally threatens you?: No  Is it safe for you to go home?: Yes        Cognitive Screening   Has your family/caregiver stated any concerns about your memory?: No     Cognitive Screening: Normal - Verbal Fluency Test    Health Maintenance Due     Health Maintenance Due   Topic Date Due    COVID-19 Vaccine (1) Never done    Flu Vaccine (1) Never done       Patient Care Team   Patient Care Team:  Oriana Ayoub MD as PCP - General (Family Medicine)  Oriana Ayoub MD as PCP - [facility] Empaneled Provider  Keara Shaw MD (Cardiology)  Gamaliel Hale MD (Cardiology)    History     Patient Active Problem List   Diagnosis Code    Morbid obesity ([address] E66.01    MATEUSZ on CPAP G47.33, Z99.89    Tracheostomy present ([address] Z93.0    Former heavy tobacco smoker Z87.891    MEGHAN (generalized anxiety disorder) F41.1    Prediabetes R73.03    Depression, major, single episode, severe ([address] F32.2    IFG (impaired fasting glucose) R73.01    Palpitations R00.2     Past Medical History:   Diagnosis Date    Depression, major, single episode, severe ([address] 11/16/2017    Morbid obesity ([address] 4/18/2016    Racing heart beat     Routine Papanicolaou smear 02/13/2019    Negative ; HPV Negative     Sleep apnea     Tracheostomy dependence ([address]     for severe sleep apnea       Past Surgical History:   Procedure Laterality Date    HX GYN  2002    removal of benign ovarian cyst - per pt done in OR, under anesthesia, but states had no incisions    HX TRACHEOSTOMY  13    Due to sleep apnea     Current Outpatient Medications   Medication Sig Dispense Refill    furosemide (LASIX) 20 mg tablet TAKE AS NEEDED OR DIRECTED BY PHYSICIAN FOR SWELLING 30 Tablet 1    metFORMIN ER (GLUCOPHAGE XR) 500 mg tablet Take 2 Tablets by mouth Before breakfast and dinner. 360 Tablet 0    hydroCHLOROthiazide (HYDRODIURIL) 25 mg tablet TAKE 1 TABLET BY MOUTH EVERY DAY 90 Tablet 1    TURMERIC PO Take  by mouth. Not taking on a daily basis      metoprolol tartrate (LOPRESSOR) 25 mg tablet TAKE 1 TABLET BY MOUTH TWICE A  Tab 2    ProAir RespiClick 90 mcg/actuation breath activated inhaler INHALE 2 PUFFS BY MOUTH EVERY 6 HOURS AS NEEDED FOR COUGH/SOB/WHEEZING      multivitamin, tx-iron-ca-min (THERA-M w/ IRON) 9 mg iron-400 mcg tab tablet Take 1 Tab by mouth daily.  aspirin delayed-release 325 mg tablet Take  by mouth every six (6) hours as needed for Pain. Allergies   Allergen Reactions    Sulfa (Sulfonamide Antibiotics) Rash       Family History   Problem Relation Age of Onset    Diabetes Mother     Hypertension Mother     Heart Disease Maternal Grandmother     No Known Problems Father     Breast Cancer Maternal Aunt         Great Aunt - ?breast ?bone    Cancer Other         great aunt - not sure what type of cancer     Social History     Tobacco Use    Smoking status: Former Smoker     Packs/day: 2.00     Years: 10.00     Pack years: 20.00     Quit date:      Years since quittin.8    Smokeless tobacco: Never Used   Substance Use Topics    Alcohol use: No     Alcohol/week: 0.0 standard drinks       Moustapha Esquivel, who was evaluated through a synchronous (real-time) audio-video encounter, and/or her healthcare decision maker, is aware that it is a billable service, with coverage as determined by her insurance carrier. She provided verbal consent to proceed: Yes, and patient identification was verified.  It was conducted pursuant to the emergency declaration under the [address] authority and the [employer] General Act. A caregiver was present when appropriate. Ability to conduct physical exam was limited. I was at home. The patient was at home. Steffanie Bashir LPN  On behalf of  Missael Ruvalcaba MD  [facility]  10/25/21 2:50 PM      Jairo Hernandez is a 37 y.o. female who was seen by synchronous (real-time) audio-video technology on 10/25/2021. Consent: Jairo Hernandez, who was seen by synchronous (real-time) audio-video technology, and/or her healthcare decision maker, is aware that this patient-initiated, Telehealth encounter on 10/25/2021 is a billable service, with coverage as determined by her insurance carrier. She is aware that she may receive a bill and has provided verbal consent to proceed: Yes. Assessment & Plan:   1. Medicare annual wellness visit, subsequent  Done today see note    2. New onset type 2 diabetes mellitus ([facility]  Doing well tolerating metformin  Healthy diet encouraged  Repeat a1c 3m from last then f/u  Call with concerns  - HEMOGLOBIN A1C WITH EAG; Future  - HEMOGLOBIN A1C WITH EAG          Subjective:   Jairo Hernandez is a 37 y.o. female who was seen for Annual Wellness Visit and Medication Evaluation (Metformin)      New start metformin: she had one or two episodes of feeling unwell after taking her metformin and that has not re emerged  She's on 1 in the am and 2 in the pm and doing well with it    No side effects    Medications, allergies, PMH, PSH, SOCH, [facility] reviewed and updated per routine protocol, see chart for review and changes if not noted here. ROS  A 12 point review of systems was negative except as noted here or in the HPI.     Objective:   Vital Signs: (As obtained by patient/caregiver at home)  Patient-Reported Vitals 10/25/2021   Patient-Reported Weight 452lb   Patient-Reported Height -   Patient-Reported Pulse 79   Patient-Reported Temperature -   Patient-Reported SpO2 -   Patient-Reported Systolic  -   Patient-Reported Diastolic -   Patient-Reported LMP 08/2021        [INSTRUCTIONS:  \"[x]\" Indicates a positive item  \"[]\" Indicates a negative item  -- DELETE ALL ITEMS NOT EXAMINED]    Constitutional: [x] Appears well-developed and well-nourished [x] No apparent distress      [] Abnormal -     Mental status: [x] Alert and awake  [x] Oriented to person/place/time [x] Able to follow commands    [] Abnormal -     Eyes:   EOM    [x]  Normal    [] Abnormal -   Sclera  [x]  Normal    [] Abnormal -          Discharge [x]  None visible   [] Abnormal -     HENT: [x] Normocephalic, atraumatic  [] Abnormal -   [x] Mouth/Throat: Mucous membranes are moist    External Ears [x] Normal  [] Abnormal -    Neck: [x] No visualized mass [] Abnormal -     Pulmonary/Chest: [x] Respiratory effort normal   [x] No visualized signs of difficulty breathing or respiratory distress        [] Abnormal -      Musculoskeletal:   [] Normal gait with no signs of ataxia         [x] Normal range of motion of neck        [] Abnormal -     Neurological:        [x] No Facial Asymmetry (Cranial nerve 7 motor function) (limited exam due to video visit)          [x] No gaze palsy        [] Abnormal -          Skin:        [x] No significant exanthematous lesions or discoloration noted on facial skin         [] Abnormal -            Psychiatric:       [x] Normal Affect [] Abnormal -        [x] No Hallucinations    Other pertinent observable physical exam findings:seated, no distress    We discussed the expected course, resolution and complications of the diagnosis(es) in detail. Medication risks, benefits, costs, interactions, and alternatives were discussed as indicated. I advised her to contact the office if her condition worsens, changes or fails to improve as anticipated.  She expressed understanding with the diagnosis(es) and plan. Nahun Dias is a 37 y.o. female who was evaluated by a video visit encounter for concerns as above. Patient identification was verified prior to start of the visit. A caregiver was present when appropriate. Due to this being a TeleHealth encounter (During [facility] public health emergency), evaluation of the following organ systems was limited: Vitals/Constitutional/EENT/Resp/CV/GI//MS/Neuro/Skin/Heme-Lymph-Imm. Pursuant to the emergency declaration under the [facility] waiver authority and the [employer] Act, this Virtual  Visit was conducted, with patient's (and/or legal guardian's) consent, to reduce the patient's risk of exposure to COVID-19 and provide necessary medical care. Services were provided through a video synchronous discussion virtually to substitute for in-person clinic visit. Patient and provider were located at their individual homes. Philip Carpenter MD  [facility]  10/25/21 2:52 PM     Portions of this note may have been populated using smart dictation software and may have \"sounds-like\" errors present.

## 2021-10-25 NOTE — PATIENT INSTRUCTIONS

## 2021-10-25 NOTE — PROGRESS NOTES
Chief Complaint   Patient presents with    Annual Wellness Visit    Medication Evaluation     Metformin     1. Have you been to the ER, urgent care clinic since your last visit? Hospitalized since your last visit? No    2. Have you seen or consulted any other health care providers outside of the [address] [place] [address] since your last visit? Include any pap smears or colon screening.  No

## 2021-11-03 ENCOUNTER — PATIENT MESSAGE (OUTPATIENT)
Dept: FAMILY MEDICINE CLINIC | Age: 41
End: 2021-11-03

## 2021-11-03 DIAGNOSIS — R73.03 PREDIABETES: ICD-10-CM

## 2021-11-03 DIAGNOSIS — R73.01 IFG (IMPAIRED FASTING GLUCOSE): Primary | ICD-10-CM

## 2021-11-09 DIAGNOSIS — M25.472 LEFT ANKLE SWELLING: ICD-10-CM

## 2021-11-10 RX ORDER — FUROSEMIDE 20 MG/1
TABLET ORAL
Qty: 30 TABLET | Refills: 1 | Status: SHIPPED | OUTPATIENT
Start: 2021-11-10 | End: 2021-11-15 | Stop reason: SDUPTHER

## 2021-11-11 RX ORDER — IBUPROFEN 200 MG
CAPSULE ORAL
Qty: 100 STRIP | Refills: 1 | Status: SHIPPED | OUTPATIENT
Start: 2021-11-11 | End: 2022-05-13

## 2021-11-11 RX ORDER — LANCETS
EACH MISCELLANEOUS
Qty: 1 EACH | Refills: 11 | Status: SHIPPED | OUTPATIENT
Start: 2021-11-11

## 2021-11-11 RX ORDER — INSULIN PUMP SYRINGE, 3 ML
EACH MISCELLANEOUS
Qty: 1 KIT | Refills: 0 | Status: SHIPPED | OUTPATIENT
Start: 2021-11-11 | End: 2022-04-15

## 2021-11-12 ENCOUNTER — TELEPHONE (OUTPATIENT)
Dept: FAMILY MEDICINE CLINIC | Age: 41
End: 2021-11-12

## 2021-11-12 DIAGNOSIS — M25.472 LEFT ANKLE SWELLING: ICD-10-CM

## 2021-11-12 NOTE — TELEPHONE ENCOUNTER
Jennifer Hall 11/3/2021 2:06 PM EDT    Would like a glucometer rx to monitor sugars     ----- Message -----  From: Michell John  Sent: 11/3/2021 1:52 PM EDT  To: [facility]  Subject: Non-Urgent Brian Lopez,    So, I have been experiencing numbness of the tongue and lips. I looked it up and found this:  One symptom of low blood sugar or hypoglycemia is a sudden numb or tingling feeling in the tongue or lips. People with diabetes are particularly at risk, so should check their blood glucose levels and seek immediate treatment if they experience this sudden tingling. My question is, can I get my own glucose monitor and strips so I can periodically test my sugar levels.      Thanks,    Ilsa

## 2021-11-15 RX ORDER — FUROSEMIDE 20 MG/1
TABLET ORAL
Qty: 30 TABLET | Refills: 1 | Status: SHIPPED | OUTPATIENT
Start: 2021-11-15 | End: 2022-03-04 | Stop reason: SDUPTHER

## 2021-11-26 ENCOUNTER — TELEPHONE (OUTPATIENT)
Dept: FAMILY MEDICINE CLINIC | Age: 41
End: 2021-11-26

## 2021-11-26 ENCOUNTER — HOSPITAL ENCOUNTER (EMERGENCY)
Age: 41
Discharge: HOME | End: 2021-11-26
Attending: EMERGENCY MEDICINE
Payer: MEDICARE

## 2021-11-26 ENCOUNTER — APPOINTMENT (OUTPATIENT)
Dept: GENERAL RADIOLOGY | Age: 41
End: 2021-11-26
Attending: PHYSICIAN ASSISTANT
Payer: MEDICARE

## 2021-11-26 VITALS
SYSTOLIC BLOOD PRESSURE: 135 MMHG | DIASTOLIC BLOOD PRESSURE: 90 MMHG | TEMPERATURE: 96.9 F | BODY MASS INDEX: 45.99 KG/M2 | WEIGHT: 293 LBS | OXYGEN SATURATION: 96 % | RESPIRATION RATE: 19 BRPM | HEIGHT: 67 IN | HEART RATE: 84 BPM

## 2021-11-26 DIAGNOSIS — R07.89 ATYPICAL CHEST PAIN: Primary | ICD-10-CM

## 2021-11-26 LAB
ALBUMIN SERPL-MCNC: 3.6 G/DL (ref 3.5–5)
ALBUMIN/GLOB SERPL: 0.8 {RATIO} (ref 1.1–2.2)
ALP SERPL-CCNC: 74 U/L (ref 45–117)
ALT SERPL-CCNC: 38 U/L (ref 12–78)
ANION GAP SERPL CALC-SCNC: 5 MMOL/L (ref 5–15)
AST SERPL-CCNC: 25 U/L (ref 15–37)
BASOPHILS # BLD: 0 K/UL (ref 0–0.1)
BASOPHILS NFR BLD: 1 % (ref 0–1)
BILIRUB SERPL-MCNC: 0.3 MG/DL (ref 0.2–1)
BUN SERPL-MCNC: 9 MG/DL (ref 6–20)
BUN/CREAT SERPL: 11 (ref 12–20)
CALCIUM SERPL-MCNC: 9.3 MG/DL (ref 8.5–10.1)
CHLORIDE SERPL-SCNC: 104 MMOL/L (ref 97–108)
CO2 SERPL-SCNC: 29 MMOL/L (ref 21–32)
CREAT SERPL-MCNC: 0.84 MG/DL (ref 0.55–1.02)
DIFFERENTIAL METHOD BLD: ABNORMAL
EOSINOPHIL # BLD: 0.2 K/UL (ref 0–0.4)
EOSINOPHIL NFR BLD: 2 % (ref 0–7)
ERYTHROCYTE [DISTWIDTH] IN BLOOD BY AUTOMATED COUNT: 15.6 % (ref 11.5–14.5)
GLOBULIN SER CALC-MCNC: 4.6 G/DL (ref 2–4)
GLUCOSE SERPL-MCNC: 108 MG/DL (ref 65–100)
HCT VFR BLD AUTO: 35.3 % (ref 35–47)
HGB BLD-MCNC: 11.4 G/DL (ref 11.5–16)
IMM GRANULOCYTES # BLD AUTO: 0 K/UL (ref 0–0.04)
IMM GRANULOCYTES NFR BLD AUTO: 0 % (ref 0–0.5)
LYMPHOCYTES # BLD: 1.5 K/UL (ref 0.8–3.5)
LYMPHOCYTES NFR BLD: 18 % (ref 12–49)
MCH RBC QN AUTO: 27.9 PG (ref 26–34)
MCHC RBC AUTO-ENTMCNC: 32.3 G/DL (ref 30–36.5)
MCV RBC AUTO: 86.3 FL (ref 80–99)
MONOCYTES # BLD: 0.6 K/UL (ref 0–1)
MONOCYTES NFR BLD: 7 % (ref 5–13)
NEUTS SEG # BLD: 6 K/UL (ref 1.8–8)
NEUTS SEG NFR BLD: 72 % (ref 32–75)
NRBC # BLD: 0 K/UL (ref 0–0.01)
NRBC BLD-RTO: 0 PER 100 WBC
PLATELET # BLD AUTO: 427 K/UL (ref 150–400)
PMV BLD AUTO: 8.6 FL (ref 8.9–12.9)
POTASSIUM SERPL-SCNC: 3.6 MMOL/L (ref 3.5–5.1)
PROT SERPL-MCNC: 8.2 G/DL (ref 6.4–8.2)
RBC # BLD AUTO: 4.09 M/UL (ref 3.8–5.2)
SODIUM SERPL-SCNC: 138 MMOL/L (ref 136–145)
TROPONIN-HIGH SENSITIVITY: 6 NG/L (ref 0–51)
WBC # BLD AUTO: 8.3 K/UL (ref 3.6–11)

## 2021-11-26 PROCEDURE — 80053 COMPREHEN METABOLIC PANEL: CPT

## 2021-11-26 PROCEDURE — 99283 EMERGENCY DEPT VISIT LOW MDM: CPT

## 2021-11-26 PROCEDURE — 84484 ASSAY OF TROPONIN QUANT: CPT

## 2021-11-26 PROCEDURE — 93005 ELECTROCARDIOGRAM TRACING: CPT

## 2021-11-26 PROCEDURE — 71046 X-RAY EXAM CHEST 2 VIEWS: CPT

## 2021-11-26 PROCEDURE — 85025 COMPLETE CBC W/AUTO DIFF WBC: CPT

## 2021-11-26 PROCEDURE — 36415 COLL VENOUS BLD VENIPUNCTURE: CPT

## 2021-11-26 NOTE — ED PROVIDER NOTES
21-year-old female history of type 2 diabetes, hypertension presenting to the ED for chest pain. Patient reports that at 8 AM this morning while at rest she started with a burning discomfort in her chest. Patient notes that symptoms have somewhat waxed and waned but have been constant since onset, currently very mild. May be radiated under the left breast slightly. Denies any shortness of breath, hemoptysis, new leg swelling, abdominal pain, nausea, vomiting, diaphoresis, exertional symptoms. Pain is not worse with deep inspiration or swallowing. No history of similar pain. No other concerns.  No treatment prior to arrival.    Past medical history: As above  Social history: Non-smoker  Family history: No family history of early coronary artery disease           Past Medical History:   Diagnosis Date    Depression, major, single episode, severe ([address] 11/16/2017    Morbid obesity ([facility] 4/18/2016    Racing heart beat     Routine Papanicolaou smear 02/13/2019    Negative ; HPV Negative     Sleep apnea     Tracheostomy dependence ([facility]     for severe sleep apnea        Past Surgical History:   Procedure Laterality Date    HX GYN  2002    removal of benign ovarian cyst - per pt done in OR, under anesthesia, but states had no incisions    HX TRACHEOSTOMY  8/26/13    Due to sleep apnea         Family History:   Problem Relation Age of Onset    Diabetes Mother     Hypertension Mother     Heart Disease Maternal Grandmother     No Known Problems Father     Breast Cancer Maternal Aunt         Great Aunt - ?breast ?bone    Cancer Other         great aunt - not sure what type of cancer       Social History     Socioeconomic History    Marital status: SINGLE     Spouse name: Not on file    Number of children: Not on file    Years of education: Not on file    Highest education level: Not on file   Occupational History    Not on file   Tobacco Use    Smoking status: Former Smoker     Packs/day: 2.00     Years: 10.00     Pack years: 20.00     Quit date:      Years since quittin.9    Smokeless tobacco: Never Used   Vaping Use    Vaping Use: Never used   Substance and Sexual Activity    Alcohol use: No     Alcohol/week: 0.0 standard drinks    Drug use: No     Comment: Smoked Marijuana in [id number], but not current user. -vr    Sexual activity: Not Currently   Other Topics Concern    Not on file   Social History Narrative    Not on file     Social Determinants of Health     Financial Resource Strain:     Difficulty of Paying Living Expenses: Not on file   Food Insecurity:     Worried About Running Out of Food in the Last Year: Not on file    Inder of Food in the Last Year: Not on file   Transportation Needs:     Lack of Transportation (Medical): Not on file    Lack of Transportation (Non-Medical): Not on file   Physical Activity:     Days of Exercise per Week: Not on file    Minutes of Exercise per Session: Not on file   Stress:     Feeling of Stress : Not on file   Social Connections:     Frequency of Communication with Friends and Family: Not on file    Frequency of Social Gatherings with Friends and Family: Not on file    Attends Ca[employer] Services: Not on file    Active Member of [address] [place] [address] [employer] or Organizations: Not on file    Attends Club or Organization Meetings: Not on file    Marital Status: Not on file   Intimate Partner Violence:     Fear of Current or Ex-Partner: Not on file    Emotionally Abused: Not on file    Physically Abused: Not on file    Sexually Abused: Not on file   Housing Stability:     Unable to Pay for Housing in the Last Year: Not on file    Number of [place] in the Last Year: Not on file    Unstable Housing in the Last Year: Not on file         ALLERGIES: Sulfa (sulfonamide antibiotics)    Review of Systems   Constitutional: Negative for fever. HENT: Negative for facial swelling. Eyes: Negative for visual disturbance.    Respiratory: Negative for shortness of breath. Cardiovascular: Positive for chest pain. Gastrointestinal: Negative for vomiting. Genitourinary: Negative for dysuria. Skin: Negative for wound. Neurological: Negative for syncope. All other systems reviewed and are negative. Vitals:    11/26/21 1336   BP: (!) 135/90   Pulse: 84   Resp: 19   Temp: 96.9 °F (36.1 °C)   SpO2: 96%   Weight: (!) 205 kg (451 lb 15.1 oz)   Height: 5' 7\" (1.702 m)            Physical Exam  Vitals and nursing note reviewed. Constitutional:       General: She is not in acute distress. Appearance: She is well-developed. She is obese. Comments: Pleasant black female, no distress   HENT:      Head: Normocephalic and atraumatic. Right Ear: External ear normal.      Left Ear: External ear normal.   Eyes:      General: No scleral icterus. Conjunctiva/sclera: Conjunctivae normal.   Neck:      Trachea: No tracheal deviation. Comments: + Tracheostomy  Cardiovascular:      Rate and Rhythm: Normal rate and regular rhythm. Heart sounds: Normal heart sounds. No murmur heard. No friction rub. No gallop. Pulmonary:      Effort: Pulmonary effort is normal. No respiratory distress. Breath sounds: Normal breath sounds. No stridor. No wheezing. Abdominal:      General: There is no distension. Palpations: Abdomen is soft. Tenderness: There is no abdominal tenderness. Musculoskeletal:         General: Normal range of motion. Cervical back: Neck supple. Skin:     General: Skin is warm and dry. Neurological:      Mental Status: She is alert and oriented to person, place, and time. Psychiatric:         Behavior: Behavior normal.          MDM  Number of Diagnoses or Management Options  Atypical chest pain  Diagnosis management comments: 25-year-old female presenting to the ED for new onset of chest pain this morning at about 8 AM, symptoms do not sound particularly cardiac in nature, PERC negative.       Reassuring work-up, troponin 6, pain ongoing x 6 hours, declined repeat. Non-ischemic EKG. Encouraged PCP and cardiology follow up. Low risk HEART score. Amount and/or Complexity of Data Reviewed  Clinical lab tests: ordered and reviewed  Tests in the radiology section of CPT®: ordered and reviewed  Discuss the patient with other providers: yes (Dr. Magi Kenyon ED attending)           Procedures                       Pt requesting to leave, declines repeat troponin.   EFRA Mei  3:48 PM

## 2021-11-26 NOTE — ED TRIAGE NOTES
Patient reports burning sensation to the chest area started at 8 am. The pain comes and goes. Reports no discomfort in Triage. Denies cough, SOB.

## 2021-11-26 NOTE — TELEPHONE ENCOUNTER
Luis Berumen is a 23year old female.     Chief complaint: URTI since jAN 2018     HPI:     23year old female with PMH as listed below here for annual physical exam   Patient reports symptoms started in 1/18   Sinus congestion   Runny nose   Ear press Pt called requesting appointment with Dr. Melony Araiza for cough with burning in chest, noting passing small clots of blood through her tracheostomy and is very concerned. Pt advised Dr. Melony Araiza out of office for the holiday, but agrees to seek urgent care and contact physician who handles her trach.  Danyell defined types were placed in this encounter.         ASSESSMENT AND PLAN:     (J45.901) Asthmatic bronchitis with acute exacerbation, unspecified asthma severity, unspecified whether persistent  (primary encounter diagnosis)    (J01.90) Acute sinusitis, rec

## 2021-11-28 LAB
ATRIAL RATE: 83 BPM
CALCULATED P AXIS, ECG09: 47 DEGREES
CALCULATED R AXIS, ECG10: -21 DEGREES
CALCULATED T AXIS, ECG11: -7 DEGREES
DIAGNOSIS, 93000: NORMAL
P-R INTERVAL, ECG05: 130 MS
Q-T INTERVAL, ECG07: 382 MS
QRS DURATION, ECG06: 94 MS
QTC CALCULATION (BEZET), ECG08: 448 MS
VENTRICULAR RATE, ECG03: 83 BPM

## 2021-12-24 ENCOUNTER — TELEPHONE (OUTPATIENT)
Dept: FAMILY MEDICINE CLINIC | Age: 41
End: 2021-12-24

## 2021-12-24 NOTE — PROGRESS NOTES
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 The patient is understanding of my recommendations and will proceed to urgent care, our office is closed today and I cannot further facilitate their testing.

## 2021-12-30 ENCOUNTER — TELEPHONE (OUTPATIENT)
Dept: FAMILY MEDICINE CLINIC | Age: 41
End: 2021-12-30

## 2021-12-30 NOTE — TELEPHONE ENCOUNTER
Pt requesting call back from Dr. Cathyann Snellen nurse regarding symptoms mentioned in mychart message due to Covid exposure from child in her . Pt complains of recurring low-grade fevers, cough, had headache at onset of symptoms, now resolved, and covid tests were negative last week.  Danyell

## 2022-01-03 ENCOUNTER — HOSPITAL ENCOUNTER (EMERGENCY)
Age: 42
Discharge: HOME | End: 2022-01-03
Attending: EMERGENCY MEDICINE
Payer: MEDICARE

## 2022-01-03 VITALS
RESPIRATION RATE: 16 BRPM | TEMPERATURE: 97.5 F | DIASTOLIC BLOOD PRESSURE: 90 MMHG | HEART RATE: 82 BPM | SYSTOLIC BLOOD PRESSURE: 154 MMHG | OXYGEN SATURATION: 95 % | HEIGHT: 67 IN | BODY MASS INDEX: 45.99 KG/M2 | WEIGHT: 293 LBS

## 2022-01-03 DIAGNOSIS — R00.0 TACHYCARDIA: Primary | ICD-10-CM

## 2022-01-03 PROCEDURE — 93005 ELECTROCARDIOGRAM TRACING: CPT

## 2022-01-03 PROCEDURE — 99282 EMERGENCY DEPT VISIT SF MDM: CPT

## 2022-01-03 RX ORDER — METFORMIN HYDROCHLORIDE 500 MG/1
1000 TABLET, EXTENDED RELEASE ORAL
Qty: 360 TABLET | Refills: 0 | Status: SHIPPED | OUTPATIENT
Start: 2022-01-03 | End: 2022-03-11

## 2022-01-03 NOTE — ED NOTES
Pt left prior to receiving discharge paperwork. Patient does not appear to be in any acute distress/shows no evidence of clinical instability at this time.

## 2022-01-03 NOTE — ED TRIAGE NOTES
Pt reports she was running errands today when she noticed her heart rate was intermittently getting elevated into the 130's. Hx of tachycardia. Took her metoprolol as prescribed this morning. Denies any other sx.

## 2022-01-03 NOTE — ED PROVIDER NOTES
56-year-old female with a history of morbid obesity, sleep apnea with tracheostomy presents with a chief complaint of rapid heart rate. Patient states that she has had racing heart in the past.  Today she noted her heart rate to be in the 120s on her Fitbit. She was asymptomatic at the time and had no shortness of breath, chest pain, palpitations. She denies any other symptoms           Past Medical History:   Diagnosis Date    Depression, major, single episode, severe ([address] 2017    Morbid obesity ([address] 2016    Racing heart beat     Routine Papanicolaou smear 2019    Negative ; HPV Negative     Sleep apnea     Tracheostomy dependence ([address]     for severe sleep apnea        Past Surgical History:   Procedure Laterality Date    HX GYN      removal of benign ovarian cyst - per pt done in OR, under anesthesia, but states had no incisions    HX TRACHEOSTOMY  13    Due to sleep apnea         Family History:   Problem Relation Age of Onset    Diabetes Mother     Hypertension Mother     Heart Disease Maternal Grandmother     No Known Problems Father     Breast Cancer Maternal Aunt         Great Aunt - ?breast ?bone    Cancer Other         great aunt - not sure what type of cancer       Social History     Socioeconomic History    Marital status: SINGLE     Spouse name: Not on file    Number of children: Not on file    Years of education: Not on file    Highest education level: Not on file   Occupational History    Not on file   Tobacco Use    Smoking status: Former Smoker     Packs/day: 2.00     Years: 10.00     Pack years: 20.00     Quit date:      Years since quittin.0    Smokeless tobacco: Never Used   Vaping Use    Vaping Use: Never used   Substance and Sexual Activity    Alcohol use: No     Alcohol/week: 0.0 standard drinks    Drug use: No     Comment: Smoked Marijuana in [id number], but not current user. -vr    Sexual activity: Not Currently   Other Topics Concern    Not on file   Social History Narrative    Not on file     Social Determinants of Health     Financial Resource Strain:     Difficulty of Paying Living Expenses: Not on file   Food Insecurity:     Worried About Running Out of Food in the Last Year: Not on file    Inder of Food in the Last Year: Not on file   Transportation Needs:     Lack of Transportation (Medical): Not on file    Lack of Transportation (Non-Medical): Not on file   Physical Activity:     Days of Exercise per Week: Not on file    Minutes of Exercise per Session: Not on file   Stress:     Feeling of Stress : Not on file   Social Connections:     Frequency of Communication with Friends and Family: Not on file    Frequency of Social Gatherings with Friends and Family: Not on file    Attends Che[employer] Services: Not on file    Active Member of [address] [place] [address] [employer] or Organizations: Not on file    Attends Club or Organization Meetings: Not on file    Marital Status: Not on file   Intimate Partner Violence:     Fear of Current or Ex-Partner: Not on file    Emotionally Abused: Not on file    Physically Abused: Not on file    Sexually Abused: Not on file   Housing Stability:     Unable to Pay for Housing in the Last Year: Not on file    Number of [place] in the Last Year: Not on file    Unstable Housing in the Last Year: Not on file         ALLERGIES: Sulfa (sulfonamide antibiotics)    Review of Systems   Constitutional: Negative for fever. HENT: Negative for rhinorrhea. Respiratory: Negative for shortness of breath. Cardiovascular: Negative for chest pain. Gastrointestinal: Negative for abdominal pain. Genitourinary: Negative for dysuria. Musculoskeletal: Negative for back pain. Skin: Negative for wound. Neurological: Negative for headaches. Psychiatric/Behavioral: Negative for confusion.        Vitals:    01/03/22 1232   BP: (!) 154/90   Pulse: 82   Resp: 16   Temp: 97.5 °F (36.4 °C)   SpO2: 95%   Weight: (!) 205 kg (451 lb 15.1 oz)   Height: 5' 7\" (1.702 m)            Physical Exam  Vitals and nursing note reviewed. Constitutional:       General: She is not in acute distress. Appearance: Normal appearance. She is obese. She is not ill-appearing, toxic-appearing or diaphoretic. HENT:      Head: Normocephalic and atraumatic. Eyes:      Extraocular Movements: Extraocular movements intact. Cardiovascular:      Rate and Rhythm: Normal rate. Pulses: Normal pulses. Pulmonary:      Effort: Pulmonary effort is normal. No respiratory distress. Abdominal:      General: There is no distension. Musculoskeletal:         General: Normal range of motion. Cervical back: Normal range of motion. Skin:     General: Skin is dry. Neurological:      Mental Status: She is alert and oriented to person, place, and time. Psychiatric:         Mood and Affect: Mood normal.          MDM  Number of Diagnoses or Management Options  Tachycardia  Diagnosis management comments: 52-year-old female with a history of tachycardia presents with chief complaint of high heart rate. She noted her heart rate to be in the 120s on her Fitbit. Here she has normal vitals and her EKG shows sinus rhythm at a rate of 74, normal intervals, borderline left axis deviation, no ischemic changes    I recommended follow-up with cardiology and family medicine. Discussed my clinical impression(s), any labs and/or radiology results with the patient. I answered any questions and addressed any concerns. Discussed the importance of following up with their primary care physician and/or specialist(s). Discussed signs or symptoms that would warrant return back to the ER for further evaluation. The patient is agreeable with discharge.          Procedures

## 2022-01-04 LAB
ATRIAL RATE: 74 BPM
CALCULATED P AXIS, ECG09: 66 DEGREES
CALCULATED R AXIS, ECG10: -25 DEGREES
CALCULATED T AXIS, ECG11: 2 DEGREES
DIAGNOSIS, 93000: NORMAL
P-R INTERVAL, ECG05: 178 MS
Q-T INTERVAL, ECG07: 398 MS
QRS DURATION, ECG06: 102 MS
QTC CALCULATION (BEZET), ECG08: 441 MS
VENTRICULAR RATE, ECG03: 74 BPM

## 2022-01-26 DIAGNOSIS — I10 ESSENTIAL HYPERTENSION: ICD-10-CM

## 2022-01-26 RX ORDER — HYDROCHLOROTHIAZIDE 25 MG/1
TABLET ORAL
Qty: 90 TABLET | Refills: 1 | Status: SHIPPED | OUTPATIENT
Start: 2022-01-26 | End: 2022-07-12

## 2022-02-09 ENCOUNTER — VIRTUAL VISIT (OUTPATIENT)
Dept: FAMILY MEDICINE CLINIC | Age: 42
End: 2022-02-09

## 2022-02-09 DIAGNOSIS — Z53.21 PROCEDURE AND TREATMENT NOT CARRIED OUT DUE TO PATIENT LEAVING PRIOR TO BEING SEEN BY HEALTH CARE PROVIDER: Primary | ICD-10-CM

## 2022-02-09 NOTE — PROGRESS NOTES
Pt came on visit to tell me she is unable to keep appt today  She will be rescheduled  Pt not seen for care

## 2022-02-09 NOTE — PROGRESS NOTES
Chief Complaint   Patient presents with    Follow-up     Buldging veins on the side of her head near her temple and top of head. 1. Have you been to the ER, urgent care clinic since your last visit? Hospitalized since your last visit? No    2. Have you seen or consulted any other health care providers outside of the [address] [place] [address] since your last visit? Include any pap smears or colon screening.  No

## 2022-02-21 ENCOUNTER — VIRTUAL VISIT (OUTPATIENT)
Dept: FAMILY MEDICINE CLINIC | Age: 42
End: 2022-02-21
Payer: MEDICARE

## 2022-02-21 ENCOUNTER — TELEPHONE (OUTPATIENT)
Dept: FAMILY MEDICINE CLINIC | Age: 42
End: 2022-02-21

## 2022-02-21 DIAGNOSIS — E11.65 UNCONTROLLED TYPE 2 DIABETES MELLITUS WITH HYPERGLYCEMIA (HCC): ICD-10-CM

## 2022-02-21 DIAGNOSIS — I10 ESSENTIAL HYPERTENSION: ICD-10-CM

## 2022-02-21 DIAGNOSIS — E66.01 MORBID OBESITY WITH BMI OF 70 AND OVER, ADULT (HCC): Primary | ICD-10-CM

## 2022-02-21 PROBLEM — F33.1 MODERATE RECURRENT MAJOR DEPRESSION (HCC): Status: ACTIVE | Noted: 2022-02-21

## 2022-02-21 PROBLEM — F40.01 AGORAPHOBIA WITH PANIC ATTACKS: Status: ACTIVE | Noted: 2022-02-21

## 2022-02-21 PROBLEM — F60.3 BORDERLINE PERSONALITY DISORDER (HCC): Status: ACTIVE | Noted: 2022-02-21

## 2022-02-21 PROCEDURE — 99214 OFFICE O/P EST MOD 30 MIN: CPT | Performed by: FAMILY MEDICINE

## 2022-02-21 PROCEDURE — G8756 NO BP MEASURE DOC: HCPCS | Performed by: FAMILY MEDICINE

## 2022-02-21 PROCEDURE — 2022F DILAT RTA XM EVC RTNOPTHY: CPT | Performed by: FAMILY MEDICINE

## 2022-02-21 PROCEDURE — 3046F HEMOGLOBIN A1C LEVEL >9.0%: CPT | Performed by: FAMILY MEDICINE

## 2022-02-21 PROCEDURE — G8427 DOCREV CUR MEDS BY ELIG CLIN: HCPCS | Performed by: FAMILY MEDICINE

## 2022-02-21 PROCEDURE — G9717 DOC PT DX DEP/BP F/U NT REQ: HCPCS | Performed by: FAMILY MEDICINE

## 2022-02-21 RX ORDER — VALSARTAN 80 MG/1
80 TABLET ORAL DAILY
COMMUNITY
End: 2022-04-26

## 2022-02-21 NOTE — TELEPHONE ENCOUNTER
----- Message from Mj Valles MD sent at 2/21/2022  9:30 AM EST -----  Regarding: \[facility] [place] [facility]\"" fax labs  [address] [place] [address] fax to [facility] off of [facility]

## 2022-02-21 NOTE — PROGRESS NOTES
Omkar Romeo is a 37 y.o. female who was seen by synchronous (real-time) audio-video technology on 2/21/2022. Consent: Omkar Romeo, who was seen by synchronous (real-time) audio-video technology, and/or her healthcare decision maker, is aware that this patient-initiated, Telehealth encounter on 2/21/2022 is a billable service, with coverage as determined by her insurance carrier. She is aware that she may receive a bill and has provided verbal consent to proceed: Yes. Assessment & Plan:   1. Morbid obesity with BMI of 70 and over, adult [facility])  Recommend surg consult, was referred several years ago but has not gotten to this yet  Recommend labs per orders  - REFERRAL TO GENERAL SURGERY  - CBC W/O DIFF; Future  - METABOLIC PANEL, COMPREHENSIVE; Future  - HEMOGLOBIN A1C WITH EAG; Future  - CBC W/O DIFF  - METABOLIC PANEL, COMPREHENSIVE  - HEMOGLOBIN A1C WITH EAG    2. Uncontrolled type 2 diabetes mellitus with hyperglycemia ([facility]  As above labs per orders  May benefit from [address] or GLP in addition to metformin  - CBC W/O DIFF; Future  - METABOLIC PANEL, COMPREHENSIVE; Future  - HEMOGLOBIN A1C WITH EAG; Future  - CBC W/O DIFF  - METABOLIC PANEL, COMPREHENSIVE  - HEMOGLOBIN A1C WITH EAG    3. Essential hypertension  Added valsartan per cards, pending testing per cards  Will follow along, appreciate Dr Jacklyn Winston input  - CBC W/O DIFF; Future  - METABOLIC PANEL, COMPREHENSIVE; Future  - HEMOGLOBIN A1C WITH EAG; Future  - CBC W/O DIFF  - METABOLIC PANEL, COMPREHENSIVE  - HEMOGLOBIN A1C WITH EAG          Pt was counseled on risks, benefits and alternatives of treatment options. All questions were asked and answered and the patient was agreeable with the treatment plan as outlined.       Subjective:   Omkar Romeo is a 37 y.o. female who was seen for Follow-up (would like to discuss bariatric's )      The patient tells me the heart doctor started her on valsartan 80mg    She is interested in weight loss surgery   Wt Readings from Last 3 Encounters:   11/26/21 (!) 451 lb 15.1 oz (205 kg)   10/01/21 (!) 452 lb (205 kg)   08/06/21 (!) 445 lb (201.9 kg)     Pt tells me she has been trying to eat on a keto diet  Off of sugar and starches eating well on the plan  She can't weigh at home, but she doesn't feel heavier        Medications, allergies, PMH, PSH, SOCH, MERLE RACHEL OF [facility] reviewed and updated per routine protocol, see chart for review and changes if not noted here. ROS  A 12 point review of systems was negative except as noted here or in the HPI.     Objective:   Vital Signs: (As obtained by patient/caregiver at home)  Patient-Reported Vitals 2/9/2022   Patient-Reported Weight 451lb   Patient-Reported Height -   Patient-Reported Pulse -   Patient-Reported Temperature -   Patient-Reported SpO2 -   Patient-Reported Systolic  (No Data)   Patient-Reported Diastolic -   Patient-Reported LMP -        [INSTRUCTIONS:  \"[x]\" Indicates a positive item  \"[]\" Indicates a negative item  -- DELETE ALL ITEMS NOT EXAMINED]    Constitutional: [x] Appears well-developed and well-nourished [x] No apparent distress      [] Abnormal -     Mental status: [x] Alert and awake  [x] Oriented to person/place/time [x] Able to follow commands    [] Abnormal -     Eyes:   EOM    [x]  Normal    [] Abnormal -   Sclera  [x]  Normal    [] Abnormal -          Discharge [x]  None visible   [] Abnormal -     HENT: [x] Normocephalic, atraumatic  [] Abnormal -   [x] Mouth/Throat: Mucous membranes are moist    External Ears [x] Normal  [] Abnormal -    Neck: [x] No visualized mass [] Abnormal -     Pulmonary/Chest: [x] Respiratory effort normal   [x] No visualized signs of difficulty breathing or respiratory distress        [] Abnormal -      Musculoskeletal:   [] Normal gait with no signs of ataxia         [x] Normal range of motion of neck        [] Abnormal -     Neurological:        [x] No Facial Asymmetry (Cranial nerve 7 motor function) (limited exam due to video visit)          [x] No gaze palsy        [] Abnormal -          Skin:        [x] No significant exanthematous lesions or discoloration noted on facial skin         [] Abnormal -            Psychiatric:       [x] Normal Affect [] Abnormal -        [x] No Hallucinations    Other pertinent observable physical exam findings:trach in place, breathing comfortably, seated, speaking full sentences    We discussed the expected course, resolution and complications of the diagnosis(es) in detail. Medication risks, benefits, costs, interactions, and alternatives were discussed as indicated. I advised her to contact the office if her condition worsens, changes or fails to improve as anticipated. She expressed understanding with the diagnosis(es) and plan. Samantha Solis is a 37 y.o. female who was evaluated by a video visit encounter for concerns as above. Patient identification was verified prior to start of the visit. A caregiver was present when appropriate. Due to this being a TeleHealth encounter (During [facility] public [facility] emergency), evaluation of the following organ systems was limited: Vitals/Constitutional/EENT/Resp/CV/GI//MS/Neuro/Skin/Heme-Lymph-Imm. Pursuant to the emergency declaration under the [facility], 1135 waiver authority and the [employer] and [employer] General Act, this Virtual  Visit was conducted, with patient's (and/or legal guardian's) consent, to reduce the patient's risk of exposure to COVID-19 and provide necessary medical care. Services were provided through a video synchronous discussion virtually to substitute for in-person clinic visit. Patient and provider were located at their individual homes. Nikunj Flores MD  [facility]  02/21/22 9:24 AM     Portions of this note may have been populated using smart dictation software and may have \"sounds-like\" errors present.

## 2022-02-25 ENCOUNTER — PATIENT MESSAGE (OUTPATIENT)
Dept: FAMILY MEDICINE CLINIC | Age: 42
End: 2022-02-25

## 2022-02-25 DIAGNOSIS — E66.01 MORBID OBESITY WITH BMI OF 70 AND OVER, ADULT (HCC): Primary | ICD-10-CM

## 2022-03-04 DIAGNOSIS — M25.472 LEFT ANKLE SWELLING: ICD-10-CM

## 2022-03-04 RX ORDER — FUROSEMIDE 20 MG/1
TABLET ORAL
Qty: 30 TABLET | Refills: 1 | Status: SHIPPED | OUTPATIENT
Start: 2022-03-04 | End: 2022-03-11 | Stop reason: SDUPTHER

## 2022-03-07 NOTE — TELEPHONE ENCOUNTER
Gege Rodrigues 3/7/2022 9:40 AM EST    Dr. Maria Antonia Angelo referral has been placed.   ----- Message -----  From: Britt Ochoa  Sent: 3/4/2022 4:00 PM EST  To: [facility]  Subject: Bariatric Treatment     So, everything checks out with both of the DrQuynh As far as my insurance. However, I need a referral for both from Dr. Mj Escobedo. Please let me know if a referral has already been done or when it would be able to be done. Thank you for working with me on this. Namita Fischer    Both doctors:  1. Dr. [facility]on [facility]  2.  Dr. Geraldine Alicea - Bariatric Surgeon

## 2022-03-09 ENCOUNTER — NURSE TRIAGE (OUTPATIENT)
Dept: OTHER | Facility: CLINIC | Age: 42
End: 2022-03-09

## 2022-03-09 NOTE — TELEPHONE ENCOUNTER
Received call from Isi at [facility] with Red Flag Complaint. Subjective: Caller states \"dizziness and possible vertigo, and issues with left ear. \"     Current Symptoms: left ear pain/pulling and congestion and ringing in left ear, went away happened 2x last week. Haven't had this week. intermittent dizziness, sensitivity to noise and light for a moment. Denies vertigo. Denies chest pain/difficulty breathing. Has happened before, but hasn't been seen before. Onset: 1 week ago; sudden    Associated Symptoms: reduced activity    Pain Severity: 0/10; Denies pain    Temperature: Denies      What has been tried: wrapped my head. LMP: NA Pregnant: NA    Recommended disposition: See PCP within 3 Days. Advised to go to nearest [facility] if unable to be seen. Care advice provided, patient verbalizes understanding; denies any other questions or concerns; instructed to call back for any new or worsening symptoms. Patient/Caller agrees with recommended disposition; writer provided warm transfer to [facility] at [facility] for appointment scheduling    Attention Provider: Thank you for allowing me to participate in the care of your patient. The patient was connected to triage in response to information provided to the [facility]. Please do not respond through this encounter as the response is not directed to a shared pool.       Reason for Disposition   [1] MILD dizziness (e.g., walking normally) AND [2] has NOT been evaluated by physician for this  (Exception: dizziness caused by heat exposure, sudden standing, or poor fluid intake)    Protocols used: [facility]

## 2022-03-11 ENCOUNTER — TELEPHONE (OUTPATIENT)
Dept: FAMILY MEDICINE CLINIC | Age: 42
End: 2022-03-11

## 2022-03-11 ENCOUNTER — OFFICE VISIT (OUTPATIENT)
Dept: FAMILY MEDICINE CLINIC | Age: 42
End: 2022-03-11
Payer: MEDICARE

## 2022-03-11 VITALS
SYSTOLIC BLOOD PRESSURE: 130 MMHG | BODY MASS INDEX: 70.78 KG/M2 | DIASTOLIC BLOOD PRESSURE: 81 MMHG | TEMPERATURE: 97.1 F | RESPIRATION RATE: 20 BRPM | HEIGHT: 67 IN | HEART RATE: 91 BPM | OXYGEN SATURATION: 97 %

## 2022-03-11 DIAGNOSIS — H69.82 DYSFUNCTION OF LEFT EUSTACHIAN TUBE: Primary | ICD-10-CM

## 2022-03-11 DIAGNOSIS — M25.472 LEFT ANKLE SWELLING: ICD-10-CM

## 2022-03-11 LAB
ALBUMIN SERPL-MCNC: 4.5 G/DL (ref 3.8–4.8)
ALBUMIN/GLOB SERPL: 1.1 {RATIO} (ref 1.2–2.2)
ALP SERPL-CCNC: 69 IU/L (ref 44–121)
ALT SERPL-CCNC: 26 IU/L (ref 0–32)
AST SERPL-CCNC: 21 IU/L (ref 0–40)
BILIRUB SERPL-MCNC: 0.3 MG/DL (ref 0–1.2)
BUN SERPL-MCNC: 14 MG/DL (ref 6–24)
BUN/CREAT SERPL: 17 (ref 9–23)
CALCIUM SERPL-MCNC: 9.9 MG/DL (ref 8.7–10.2)
CHLORIDE SERPL-SCNC: 98 MMOL/L (ref 96–106)
CO2 SERPL-SCNC: 22 MMOL/L (ref 20–29)
CREAT SERPL-MCNC: 0.83 MG/DL (ref 0.57–1)
EGFR: 91 ML/MIN/1.73
ERYTHROCYTE [DISTWIDTH] IN BLOOD BY AUTOMATED COUNT: 14.3 % (ref 11.7–15.4)
EST. AVERAGE GLUCOSE BLD GHB EST-MCNC: 128 MG/DL
GLOBULIN SER CALC-MCNC: 4 G/DL (ref 1.5–4.5)
GLUCOSE SERPL-MCNC: 115 MG/DL (ref 65–99)
HBA1C MFR BLD: 6.1 % (ref 4.8–5.6)
HCT VFR BLD AUTO: 38.4 % (ref 34–46.6)
HGB BLD-MCNC: 11.9 G/DL (ref 11.1–15.9)
INTERPRETATION: NORMAL
MCH RBC QN AUTO: 26.9 PG (ref 26.6–33)
MCHC RBC AUTO-ENTMCNC: 31 G/DL (ref 31.5–35.7)
MCV RBC AUTO: 87 FL (ref 79–97)
PLATELET # BLD AUTO: 512 X10E3/UL (ref 150–450)
POTASSIUM SERPL-SCNC: 4.2 MMOL/L (ref 3.5–5.2)
PROT SERPL-MCNC: 8.5 G/DL (ref 6–8.5)
RBC # BLD AUTO: 4.42 X10E6/UL (ref 3.77–5.28)
SODIUM SERPL-SCNC: 138 MMOL/L (ref 134–144)
WBC # BLD AUTO: 8 X10E3/UL (ref 3.4–10.8)

## 2022-03-11 PROCEDURE — G8754 DIAS BP LESS 90: HCPCS | Performed by: FAMILY MEDICINE

## 2022-03-11 PROCEDURE — G8427 DOCREV CUR MEDS BY ELIG CLIN: HCPCS | Performed by: FAMILY MEDICINE

## 2022-03-11 PROCEDURE — G9717 DOC PT DX DEP/BP F/U NT REQ: HCPCS | Performed by: FAMILY MEDICINE

## 2022-03-11 PROCEDURE — G8417 CALC BMI ABV UP PARAM F/U: HCPCS | Performed by: FAMILY MEDICINE

## 2022-03-11 PROCEDURE — G8752 SYS BP LESS 140: HCPCS | Performed by: FAMILY MEDICINE

## 2022-03-11 PROCEDURE — 99213 OFFICE O/P EST LOW 20 MIN: CPT | Performed by: FAMILY MEDICINE

## 2022-03-11 RX ORDER — METFORMIN HYDROCHLORIDE 500 MG/1
1000 TABLET, EXTENDED RELEASE ORAL
Qty: 360 TABLET | Refills: 0 | Status: SHIPPED | OUTPATIENT
Start: 2022-03-11 | End: 2022-06-02

## 2022-03-11 RX ORDER — LORATADINE 10 MG/1
10 TABLET ORAL
Qty: 90 TABLET | Refills: 0 | Status: SHIPPED | OUTPATIENT
Start: 2022-03-11 | End: 2022-04-26

## 2022-03-11 RX ORDER — FUROSEMIDE 20 MG/1
20 TABLET ORAL
Qty: 30 TABLET | Refills: 5 | Status: SHIPPED | OUTPATIENT
Start: 2022-03-11 | End: 2022-08-29

## 2022-03-11 RX ORDER — AZELASTINE 1 MG/ML
1 SPRAY, METERED NASAL 2 TIMES DAILY
Qty: 1 EACH | Refills: 1 | Status: SHIPPED | OUTPATIENT
Start: 2022-03-11 | End: 2022-04-07

## 2022-03-11 NOTE — TELEPHONE ENCOUNTER
[facility] pharmacy faxed request for clarification on how many tablets of Furosemide 20 mg pt is to take per dose on prescription sent 3/4/22.  Danyell

## 2022-03-11 NOTE — PROGRESS NOTES
Family Medicine Follow-Up Progress Note  Patient: Josue Clay  1980, 37 y.o., female  Encounter Date: 3/11/2022    ASSESSMENT & PLAN    ICD-10-CM ICD-9-CM    1. Dysfunction of left eustachian tube  H69.82 381.81    2. Left ankle swelling  M25.472 719.07 furosemide (LASIX) 20 mg tablet       Orders Placed This Encounter    loratadine (Claritin) 10 mg tablet     Sig: Take 1 Tablet by mouth daily as needed for Allergies. Dispense:  90 Tablet     Refill:  0    azelastine (ASTELIN) 137 mcg (0.1 %) nasal spray     Si Tad by Both Nostrils route two (2) times a day. Use in each nostril as directed     Dispense:  1 Each     Refill:  1    furosemide (LASIX) 20 mg tablet     Sig: Take 1 Tablet by mouth daily as needed (edema). TAKE DAILY BY MOUTH AS NEEDED FOR LEG EDEMA     Dispense:  30 Tablet     Refill:  5       Treat as eustachian tube dysfxn with claritin + astelin  Avoid decongestants  Lasix still prn leg swelling    CHIEF COMPLAINT  Chief Complaint   Patient presents with    Ringing in Ear     Left, started 2 weeks ago. Cari Zuluaga is a 37 y.o. female presenting today for ringing in her ear intermittently over the last 2 week  L ear only  Some echo  Some clicking and popping  Some mucous production and drainage  Pt tells me she has no systemic signs of sinusitis or infection  Compliant with bp meds  bp at goal  Compliant with diabetic treamtent  a1c came back today doing really very well  Lab Results   Component Value Date/Time    Hemoglobin A1c 6.1 (H) 03/10/2022 10:59 AM     Unfortunately unable to weigh on scale in office, pt reports she is actively losing weight    ROS  Review of Systems  A 12 point review of systems was negative except as noted here or in the HPI.     OBJECTIVE  Visit Vitals  /81 (BP 1 Location: Left arm, BP Patient Position: Sitting, BP Cuff Size: Thigh)   Pulse 91   Temp 97.1 °F (36.2 °C) (Temporal)   Resp 20   Ht 5' 7\" (1.702 m)   SpO2 97% BMI 70.78 kg/m²       Physical Exam  Constitutional:       General: She is not in acute distress. Appearance: Normal appearance. She is obese. She is not ill-appearing, toxic-appearing or diaphoretic. HENT:      Head: Normocephalic and atraumatic. Right Ear: Tympanic membrane, ear canal and external ear normal. There is no impacted cerumen. Left Ear: Tympanic membrane, ear canal and external ear normal. There is no impacted cerumen. Mouth/Throat:      Mouth: Mucous membranes are moist.   Eyes:      General: No scleral icterus. Right eye: No discharge. Left eye: No discharge. Comments: eom grossly intact   Neck:      Comments: No visible neck masses , ROM appears normal from visual inspection  Cardiovascular:      Rate and Rhythm: Normal rate and regular rhythm. Heart sounds: No friction rub. No gallop. Pulmonary:      Effort: Pulmonary effort is normal. No respiratory distress. Skin:     Comments: Visible skin is without jaundice, bruising, lesion, pallor, erythema or rash except as otherwise noted   Neurological:      General: No focal deficit present. Mental Status: She is alert and oriented to person, place, and time. Psychiatric:         Mood and Affect: Mood normal.         Behavior: Behavior normal.         Thought Content: Thought content normal.         Judgment: Judgment normal.         No results found for any visits on 03/11/22.     HISTORICAL  Reviewed and updated today, and as noted below:    Past Medical History:   Diagnosis Date    Depression, major, single episode, severe ([address] 11/16/2017    Morbid obesity ([address] 4/18/2016    Racing heart beat     Routine Papanicolaou smear 02/13/2019    Negative ; HPV Negative     Sleep apnea     Tracheostomy dependence ([address]     for severe sleep apnea      Past Surgical History:   Procedure Laterality Date    HX GYN  2002    removal of benign ovarian cyst - per pt done in OR, under anesthesia, but states had no incisions    HX TRACHEOSTOMY  13    Due to sleep apnea     Family History   Problem Relation Age of Onset    Diabetes Mother     Hypertension Mother     Heart Disease Maternal Grandmother     No Known Problems Father     Breast Cancer Maternal Aunt         Great Aunt - ?breast ?bone    Cancer Other         great aunt - not sure what type of cancer     Social History     Tobacco Use   Smoking Status Former Smoker    Packs/day: 2.00    Years: 10.00    Pack years: 20.00    Quit date:     Years since quittin.1   Smokeless Tobacco Never Used     Social History     Socioeconomic History    Marital status: SINGLE   Tobacco Use    Smoking status: Former Smoker     Packs/day: 2.00     Years: 10.00     Pack years: 20.00     Quit date:      Years since quittin.1    Smokeless tobacco: Never Used   Vaping Use    Vaping Use: Never used   Substance and Sexual Activity    Alcohol use: No     Alcohol/week: 0.0 standard drinks    Drug use: No     Comment: Smoked Marijuana in [id number], but not current user. -vr    Sexual activity: Not Currently     Allergies   Allergen Reactions    Sulfa (Sulfonamide Antibiotics) Rash       LAB REVIEW  Lab Results   Component Value Date/Time    Sodium 138 03/10/2022 10:59 AM    Potassium 4.2 03/10/2022 10:59 AM    Chloride 98 03/10/2022 10:59 AM    CO2 22 03/10/2022 10:59 AM    Anion gap 5 2021 02:27 PM    Glucose 115 (H) 03/10/2022 10:59 AM    BUN 14 03/10/2022 10:59 AM    Creatinine 0.83 03/10/2022 10:59 AM    BUN/Creatinine ratio 17 03/10/2022 10:59 AM    GFR est AA >60 2021 02:27 PM    GFR est non-AA >60 2021 02:27 PM    Calcium 9.9 03/10/2022 10:59 AM    Bilirubin, total 0.3 03/10/2022 10:59 AM    Alk.  phosphatase 69 03/10/2022 10:59 AM    Protein, total 8.5 03/10/2022 10:59 AM    Albumin 4.5 03/10/2022 10:59 AM    Globulin 4.6 (H) 2021 02:27 PM    A-G Ratio 1.1 (L) 03/10/2022 10:59 AM    ALT (SGPT) 26 03/10/2022 10:59 AM     Lab Results   Component Value Date/Time    WBC 8.0 03/10/2022 10:59 AM    Hemoglobin (POC) 12.6 04/03/2016 06:46 AM    HGB 11.9 03/10/2022 10:59 AM    Hematocrit (POC) 37 04/03/2016 06:46 AM    HCT 38.4 03/10/2022 10:59 AM    PLATELET 727 (H) 09/28/0982 10:59 AM    MCV 87 03/10/2022 10:59 AM     Lab Results   Component Value Date/Time    Hemoglobin A1c 6.1 (H) 03/10/2022 10:59 AM     Lab Results   Component Value Date/Time    Cholesterol, total 162 10/01/2021 04:58 PM    HDL Cholesterol 41 10/01/2021 04:58 PM    LDL, calculated 105 (H) 10/01/2021 04:58 PM    LDL, calculated 87 03/07/2018 12:43 PM    VLDL, calculated 16 10/01/2021 04:58 PM    VLDL, calculated 11 03/07/2018 12:43 PM    Triglyceride 85 10/01/2021 04:58 PM           Cisco Najera MD  [facility]  03/11/22 12:44 PM    Portions of this note may have been populated using smart dictation software and may have \"sounds-like\" errors present. Pt was counseled on risks, benefits and alternatives of treatment options. All questions were asked and answered and the patient was agreeable with the treatment plan as outlined.

## 2022-03-11 NOTE — PROGRESS NOTES
Chief Complaint   Patient presents with    Ringing in Ear     Left, started 2 weeks ago. 1. Have you been to the ER, urgent care clinic since your last visit? Hospitalized since your last visit? No    2. Have you seen or consulted any other health care providers outside of the [address] [place] [address] since your last visit? Include any pap smears or colon screening.  No

## 2022-03-19 PROBLEM — R00.2 PALPITATIONS: Status: ACTIVE | Noted: 2018-07-13

## 2022-03-19 PROBLEM — F60.3 BORDERLINE PERSONALITY DISORDER (HCC): Status: ACTIVE | Noted: 2022-02-21

## 2022-03-19 PROBLEM — R73.01 IFG (IMPAIRED FASTING GLUCOSE): Status: ACTIVE | Noted: 2018-07-10

## 2022-03-19 PROBLEM — F40.01 AGORAPHOBIA WITH PANIC ATTACKS: Status: ACTIVE | Noted: 2022-02-21

## 2022-03-19 PROBLEM — F33.1 MODERATE RECURRENT MAJOR DEPRESSION (HCC): Status: ACTIVE | Noted: 2022-02-21

## 2022-03-19 PROBLEM — F32.2 DEPRESSION, MAJOR, SINGLE EPISODE, SEVERE (HCC): Status: ACTIVE | Noted: 2017-11-16

## 2022-03-24 ENCOUNTER — TRANSCRIBE ORDER (OUTPATIENT)
Dept: SCHEDULING | Age: 42
End: 2022-03-24

## 2022-03-24 DIAGNOSIS — I20.0 INTERMEDIATE CORONARY SYNDROME (HCC): Primary | ICD-10-CM

## 2022-04-01 ENCOUNTER — HOSPITAL ENCOUNTER (OUTPATIENT)
Dept: CT IMAGING | Age: 42
Discharge: HOME | End: 2022-04-01
Attending: SPECIALIST
Payer: MEDICARE

## 2022-04-01 VITALS — SYSTOLIC BLOOD PRESSURE: 125 MMHG | HEART RATE: 70 BPM | DIASTOLIC BLOOD PRESSURE: 71 MMHG

## 2022-04-01 DIAGNOSIS — I20.0 INTERMEDIATE CORONARY SYNDROME (HCC): ICD-10-CM

## 2022-04-01 PROCEDURE — 75574 CT ANGIO HRT W/3D IMAGE: CPT

## 2022-04-01 PROCEDURE — 74011000636 HC RX REV CODE- 636: Performed by: SPECIALIST

## 2022-04-01 PROCEDURE — 74011250637 HC RX REV CODE- 250/637: Performed by: SPECIALIST

## 2022-04-01 RX ORDER — IODIXANOL 320 MG/ML
100 INJECTION, SOLUTION INTRAVASCULAR ONCE
Status: COMPLETED | OUTPATIENT
Start: 2022-04-01 | End: 2022-04-01

## 2022-04-01 RX ORDER — NITROGLYCERIN 0.4 MG/1
0.4 TABLET SUBLINGUAL AS NEEDED
Status: DISCONTINUED | OUTPATIENT
Start: 2022-04-01 | End: 2022-04-05 | Stop reason: HOSPADM

## 2022-04-01 RX ADMIN — NITROGLYCERIN 0.4 MG: 0.4 TABLET, ORALLY DISINTEGRATING SUBLINGUAL at 08:46

## 2022-04-01 RX ADMIN — IODIXANOL 100 ML: 320 INJECTION, SOLUTION INTRAVASCULAR at 09:12

## 2022-04-01 NOTE — PROGRESS NOTES
3739- Pt to CT room. IV SL established by SABA Geiger, RT. Pt tolerated well. Pt took beta blocker PTA. HR- 74. Pt has tracheostomy. Has difficulty breathing while lying flat. Adjustments made to pillows and positioning to accommodate. 9789- CT scan begins. 0840- Pt unable to breathe well. Scan paused. Adjustments made to prevent chest tissues from occluding trach area. Pt reports she is able to breathe much better after this. Scanning restarted. 4218- NTG given. IV patent. Pt reports she is doing well. Breathing is good. HR- 70    0850- CT scan complete. Pt tolerated procedure well. Moved to sitting position as soon as able. Pt very appreciative. 0900- IV D/C'd intact without problem. D/C instructions reviewed with pt and copy given. Verbalized understanding. D/C'd amb, stable, NAD.

## 2022-04-04 ENCOUNTER — TRANSCRIBE ORDER (OUTPATIENT)
Dept: SCHEDULING | Age: 42
End: 2022-04-04

## 2022-04-07 RX ORDER — AZELASTINE 1 MG/ML
1 SPRAY, METERED NASAL 2 TIMES DAILY
Qty: 1 EACH | Refills: 0 | Status: SHIPPED | OUTPATIENT
Start: 2022-04-07

## 2022-04-11 ENCOUNTER — VIRTUAL VISIT (OUTPATIENT)
Dept: SURGERY | Age: 42
End: 2022-04-11

## 2022-04-11 VITALS — TEMPERATURE: 97.8 F | HEIGHT: 67 IN | WEIGHT: 293 LBS | BODY MASS INDEX: 45.99 KG/M2

## 2022-04-11 DIAGNOSIS — E66.01 MORBID OBESITY (HCC): Primary | ICD-10-CM

## 2022-04-11 NOTE — PROGRESS NOTES
1. Have you been to the ER, urgent care clinic since your last visit? Hospitalized since your last visit? No    2. Have you seen or consulted any other health care providers outside of the [address] [place] [address] since your last visit? Include any pap smears or colon screening.  No

## 2022-04-14 DIAGNOSIS — R73.03 PREDIABETES: ICD-10-CM

## 2022-04-14 DIAGNOSIS — R73.01 IFG (IMPAIRED FASTING GLUCOSE): ICD-10-CM

## 2022-04-14 NOTE — PROGRESS NOTES
Chief Complaint   Patient presents with    Depression     3 wk f/u     1. Have you been to the ER, urgent care clinic since your last visit? Yes [facility] palpitations 12/02/17  Hospitalized since your last visit? No    2. Have you seen or consulted any other health care providers outside of the [address] [place] [address] since your last visit? Include any pap smears or colon screening.  No      Health Maintenance Due   Topic Date Due    Cervical Cancer Screening  06/20/2002   Pt declined flu vaccine [Follow-Up Visit] : a follow-up visit for [Spouse] : spouse [FreeTextEntry2] : Thrombophilia

## 2022-04-15 RX ORDER — BLOOD-GLUCOSE METER
EACH MISCELLANEOUS
Qty: 1 EACH | Refills: 1 | Status: SHIPPED | OUTPATIENT
Start: 2022-04-15 | End: 2022-05-06

## 2022-04-26 ENCOUNTER — VIRTUAL VISIT (OUTPATIENT)
Dept: SURGERY | Age: 42
End: 2022-04-26
Payer: MEDICARE

## 2022-04-26 VITALS — BODY MASS INDEX: 45.99 KG/M2 | WEIGHT: 293 LBS | HEIGHT: 67 IN

## 2022-04-26 DIAGNOSIS — J45.20 MILD INTERMITTENT ASTHMA WITHOUT COMPLICATION: ICD-10-CM

## 2022-04-26 DIAGNOSIS — R00.2 PALPITATIONS: ICD-10-CM

## 2022-04-26 DIAGNOSIS — E11.9 CONTROLLED TYPE 2 DIABETES MELLITUS WITHOUT COMPLICATION, WITHOUT LONG-TERM CURRENT USE OF INSULIN (HCC): ICD-10-CM

## 2022-04-26 DIAGNOSIS — Z99.89 OSA ON CPAP: ICD-10-CM

## 2022-04-26 DIAGNOSIS — G47.33 OSA ON CPAP: ICD-10-CM

## 2022-04-26 DIAGNOSIS — Z93.0 TRACHEOSTOMY PRESENT (HCC): ICD-10-CM

## 2022-04-26 DIAGNOSIS — E66.01 MORBID OBESITY (HCC): Primary | ICD-10-CM

## 2022-04-26 PROCEDURE — 99443 PR PHYS/QHP TELEPHONE EVALUATION 21-30 MIN: CPT | Performed by: SURGERY

## 2022-04-26 NOTE — PROGRESS NOTES
1. Have you been to the ER, urgent care clinic since your last visit? Hospitalized since your last visit? new patient    2. Have you seen or consulted any other health care providers outside of the [address] [place] [address] since your last visit? Include any pap smears or colon screening.  New patient

## 2022-04-28 NOTE — PROGRESS NOTES
I was at home while conducting this encounter. Consent:  She and/or her healthcare decision maker is aware that this patient-initiated Telehealth encounter is a billable service, with coverage as determined by her insurance carrier. She is aware that she may receive a bill and has provided verbal consent to proceed: Yes    This virtual visit was conducted via [employer]. Pursuant to the emergency declaration under the [facility] [place] [address] waiver authority and the [employer] and [employer] Act, this Virtual  Visit was conducted to reduce the patient's risk of exposure to COVID-19 and provide continuity of care for an established patient. Services were provided through a video synchronous discussion virtually to substitute for in-person clinic visit. Due to this being a TeleHealth evaluation, many elements of the physical examination are unable to be assessed. Total Time: minutes: 21-30 minutes. Bariatric Surgery Consultation    Subjective: The patient is a 37 y.o. obese  female with a Body mass index is 66.56 kg/m². .  The patient has been at her heaviest weight for the past year; she has been overweight since childhood; she has been considering surgery since 2021 she desires surgery at this time because medical efforts weight loss been unsuccessful. Mariola Ornelas has tried multiple diets in her lifetime most recently tried unsupervised diets. She has required tracheostomy for management of severe obstructive sleep apnea.     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 The patients goal weight is 250 lbs. The highest acceptable weight is 300 lbs. These goals are consistent with expected outcomes of their desired operation. her Medical goals are MATEUSZ resolution, tracheostomy removal; her qualty of life goals are decreased fatigue.     Patient Active Problem List    Diagnosis Date Noted    Agoraphobia with panic attacks 2022    Borderline personality disorder ([facility] 2022    Moderate recurrent major depression ([facility] 2022    Palpitations 2018    IFG (impaired fasting glucose) 07/10/2018    Depression, major, single episode, severe ([facility] 2017    Former heavy tobacco smoker 2016    MEGHAN (generalized anxiety disorder) 2016    Prediabetes 2016    Morbid obesity ([facility] 2016    MATEUSZ on CPAP 2016    Tracheostomy present ([facility] 2016      Past Surgical History:   Procedure Laterality Date    HX GYN      removal of benign ovarian cyst - per pt done in OR, under anesthesia, but states had no incisions    HX TRACHEOSTOMY  13    Due to sleep apnea      Social History     Tobacco Use    Smoking status: Former Smoker     Packs/day: 2.00     Years: 10.00     Pack years: 20.00     Quit date:      Years since quittin.3    Smokeless tobacco: Never Used   Substance Use Topics    Alcohol use: No     Alcohol/week: 0.0 standard drinks      Family History   Problem Relation Age of Onset    Diabetes Mother     Hypertension Mother     Heart Disease Maternal Grandmother     No Known Problems Father     Breast Cancer Maternal Aunt         Great Aunt - ?breast ?bone    Cancer Other         great aunt - not sure what type of cancer      Prior to Admission medications Medication Sig Start Date End Date Taking? Authorizing Provider   azelastine (ASTELIN) 137 mcg (0.1 %) nasal spray 1 SPRAY BY BOTH NOSTRILS ROUTE TWO (2) TIMES A DAY. USE IN EACH NOSTRIL AS DIRECTED 4/7/22  Yes Wolf Chambers MD   furosemide (LASIX) 20 mg tablet Take 1 Tablet by mouth daily as needed (edema). TAKE DAILY BY MOUTH AS NEEDED FOR LEG EDEMA 3/11/22  Yes Didi García MD   metFORMIN ER (GLUCOPHAGE XR) 500 mg tablet TAKE 2 TABLETS BY MOUTH BEFORE BREAKFAST AND DINNER. 3/11/22  Yes Didi García MD   hydroCHLOROthiazide (HYDRODIURIL) 25 mg tablet TAKE 1 TABLET BY MOUTH EVERY DAY 1/26/22  Yes Didi García MD   metoprolol tartrate (LOPRESSOR) 25 mg tablet TAKE 1 TABLET BY MOUTH TWICE A DAY 1/3/22  Yes iDdi García MD   lancets misc Check blood sugar once daily dx IFG 11/11/21  Yes Didi García MD   glucose blood VI test strips (blood glucose test) strip Check blood sugar once daily Dx IFG 11/11/21  Yes Didi García MD   aspirin delayed-release 325 mg tablet Take  by mouth every six (6) hours as needed for Pain. Yes Provider, Historical   OneTouch Ultra2 Meter [facility] CHECK BLOOD SUGAR ONCE DAILY (R73.01) 4/15/22   MD SURY Conway PO Take  by mouth. Not taking on a daily basis  Patient not taking: Reported on 4/26/2022    Provider, Historical   ProAir RespiClick 90 mcg/actuation breath activated inhaler INHALE 2 PUFFS BY MOUTH EVERY 6 HOURS AS NEEDED FOR COUGH/SOB/WHEEZING  Patient not taking: Reported on 4/26/2022 11/19/20   Provider, Historical   multivitamin, tx-iron-ca-min (THERA-M w/ IRON) 9 mg iron-400 mcg tab tablet Take 1 Tab by mouth daily. Patient not taking: Reported on 4/26/2022    Provider, Historical     Allergies   Allergen Reactions    Sulfa (Sulfonamide Antibiotics) Rash         Review of Systems:    Review of Systems   Constitutional: Positive for weight loss. Negative for chills and fever. HENT: Negative. Eyes: Negative. Respiratory: Positive for shortness of breath and wheezing. Negative for cough. Cardiovascular: Negative for chest pain and palpitations. Gastrointestinal: Negative for abdominal pain, diarrhea, heartburn, nausea and vomiting. Genitourinary: Negative. Musculoskeletal: Positive for back pain, joint pain and neck pain. Skin: Negative. Neurological: Negative. Endo/Heme/Allergies: Negative. Psychiatric/Behavioral: Negative. Objective:     Visit Vitals  Ht 5' 7\" (1.702 m)   Wt (!) 425 lb (192.8 kg)   BMI 66.56 kg/m²       Physical Exam:  GENERAL: alert, cooperative, no distress, appears stated age, morbidly obese      Assessment:     Morbid obesity with comorbidities; no success with medical management. Plan:     laparoscopic gastric bypass surgery    This is a 37 y.o. female with a BMI of Body mass index is 66.56 kg/m². and the weight-related co-morbidities listed above. Huy Lemus meets the NIH criteria for bariatric surgery based upon the BMI of Body mass index is 66.56 kg/m². and multiple weight-related co-morbidities. Huy Lemus has elected laparoscopic sammie-en-Y gastric bypass as her intervention of choice for treatment of morbid obesity through surgical means secondary to its long term history of success. In the office today, following Hilary's history and physical examination, a 30 minute discussion regarding the anatomic alterations for the laparoscopic sammie-en-Y gastric bypass was undertaken. The dietary expectations and the patient and physician dependent factors for success were thoroughly discussed, to include the need for interval follow-up and long-term dietary changes associated with success. The possible complications of the sammie-en-Y gastric bypass  were also discussed, to include VTE, staple line leak, bleeding, stricture, infection, internal hernia, conversion to open procedure, ulcer, comorbidity persistence, poor weight loss/weight regain, and pouch dilation. Specific weight related outcomes for success were also discussed with an emphasis on careful and close follow-up with the first year. The patient expressed an understanding of the above factors, and her questions were answered in their entirety. In addition, the patient attended a 1.5 hour power point seminar regarding obesity, surgical weight loss including, adjustable gastric band, gastric bypass, and sleeve gastrectomy. This discussion contrasted the different surgical techniques, mechanisms of actions and expected outcomes, and surgical and medical risks associated with each procedure. During this seminar, there was a long question and answer session where each questions was answered until there were no additional questions. Today, the patient had all of her questions answered and desires to proceed with pre-qualification for bariatric surgery initially choosing sammie-en-Y gastric bypass as her surgical option. She will schedule psychology evaluation and initiate supervised medical weight loss program.  We discussed her preoperative weight loss requirement, and she understands she will need to lose an additional 40 pounds before proceeding with intended surgery.       Signed By: Leobardo Anthony MD     April 28, 2022

## 2022-04-28 NOTE — PATIENT INSTRUCTIONS
Learning About Weight-Loss (Bariatric) Surgery  What is weight-loss surgery? Bariatric surgery is surgery to help you lose weight. This type of surgery is only used for people who are very overweight and have not been able to lose weight with diet and exercise. This surgery makes the stomach smaller. Some types of surgery also change the connection between your stomach and intestines. Having weight-loss surgery is a big step. After surgery, you'll need to make new, lifelong changes in how you eat and drink. How is weight-loss surgery done? Bariatric surgery may be either \"open\" or \"laparoscopic. \" Open surgery is done through a large cut (incision) in the belly. Laparoscopic surgery is done through several small cuts. The doctor puts a lighted tube, or scope, and other surgical tools through small cuts in your belly. The doctor is able to see your organs with the scope. There are different types of bariatric surgery. Gastric sleeve surgery  The surgery is usually done through several small incisions in the belly. The doctor removes more than half of your stomach. This leaves a thin sleeve, or tube, that is about the size of a banana. Because part of your stomach has been removed, this can't be reversed. Lina-en-Y gastric bypass surgery  Lina-en-Y (say \"renzo-en-why\") surgery changes the connection between the stomach and the intestines. The doctor separates a section of your stomach from the rest of your stomach. This makes a small pouch. The new pouch will hold the food you eat. The doctor connects the stomach pouch to the middle part of the small intestine. Gastric banding surgery  The surgery is usually done through several small incisions in the belly. The doctor wraps a band around the upper part of the stomach. This creates a small pouch. The small size of the pouch means that you will get full after you eat just a small amount of food.  The doctor can inflate or deflate the band to adjust the size. This lets the doctor adjust how quickly food passes from the new pouch into the stomach. It does not change the connection between the stomach and the intestines. What can you expect after the surgery? You may stay in the hospital for one or more days after the surgery. How long you stay depends on the type of surgery you had. Most people need 2 to 4 weeks before they are ready to get back to their usual routine. Your doctor will give you specific instructions about what to eat after the surgery. You'll start with only small amounts of soft foods and liquids. Bit by bit, you will be able to eat more solid foods. Your doctor may advise you to work with a dietitian. This way you'll be sure to get enough protein, vitamins, and minerals while you are losing weight. Even with a healthy diet, you may need to take vitamin and mineral supplements. After surgery, you will not be able to eat very much at one time. You will get full quickly. Try not to eat too much at one time or eat foods that are high in fat or sugar. If you do, you may vomit, get stomach pain, or have diarrhea. Weight loss  You probably will lose weight very quickly in the first few months after surgery. As time goes on, your weight loss will slow down. You will have regular doctor visits to check how you are doing. Emotions  It is common to have many emotions after this surgery. You may feel happy or excited as you begin to lose weight. But you may also feel overwhelmed or frustrated by the changes that you have to make in your diet, activity, and lifestyle. Talk with your doctor if you have concerns or questions. Think of bariatric surgery as a tool to help you lose weight. It isn't an instant fix. You will still need to eat a healthy diet and get regular exercise. This will help you reach your weight goal and avoid regaining the weight you lose. Follow-up care is a key part of your treatment and safety.  Be sure to make and go to all appointments, and call your doctor if you are having problems. It's also a good idea to know your test results and keep a list of the medicines you take. Where can you learn more? Go to http://www.gray.com/  Enter G469 in the search box to learn more about \"Learning About Weight-Loss (Bariatric) Surgery. \"  Current as of: December 27, 2021               Content Version: 13.2  © 2006-2022 Healthwise, [employer]. Care instructions adapted under license by "[employer]" (which disclaims liability or warranty for this information). If you have questions about a medical condition or this instruction, always ask your healthcare professional. [address] any warranty or liability for your use of this information.

## 2022-05-05 ENCOUNTER — NURSE TRIAGE (OUTPATIENT)
Dept: OTHER | Facility: CLINIC | Age: 42
End: 2022-05-05

## 2022-05-05 NOTE — TELEPHONE ENCOUNTER
Received call from Marilyn Chawla at [facility] with [employer]. Subjective: Caller states \"A month ago, noted dry skin in eye brows and cheek which was dry flaky and itching. Top of lip there is mild swelling, dry. \"    Patient states spoke with office this morning and already has VV scheduled for tomorrow, was calling in to see if anything available today or tomorrow with PCP. Current Symptoms: rash top of lip, mild swelling    Did not triage, spoke to another triager this morning regarding symptoms and has VV scheduled for tomorrow, was calling to see if she could be seen my PCP today or tomorrow instead. Writer provided warm transfer to [employer] at [facility] for scheduling. Attention Provider: Thank you for allowing me to participate in the care of your patient. The patient was connected to triage in response to information provided to the [facility]. Please do not respond through this encounter as the response is not directed to a shared pool.     Reason for Disposition   Caller has already spoken with the PCP (or office), and has no further questions    Protocols used: NO CONTACT OR DUPLICATE CONTACT CALL-ADULT-[place]

## 2022-05-06 ENCOUNTER — VIRTUAL VISIT (OUTPATIENT)
Dept: FAMILY MEDICINE CLINIC | Age: 42
End: 2022-05-06
Payer: MEDICARE

## 2022-05-06 DIAGNOSIS — B37.9 CANDIDIASIS: ICD-10-CM

## 2022-05-06 DIAGNOSIS — L24.9 ACUTE IRRITANT CONTACT DERMATITIS: Primary | ICD-10-CM

## 2022-05-06 DIAGNOSIS — E66.01 MORBID OBESITY (HCC): ICD-10-CM

## 2022-05-06 PROCEDURE — G8417 CALC BMI ABV UP PARAM F/U: HCPCS | Performed by: FAMILY MEDICINE

## 2022-05-06 PROCEDURE — 99214 OFFICE O/P EST MOD 30 MIN: CPT | Performed by: FAMILY MEDICINE

## 2022-05-06 PROCEDURE — G8427 DOCREV CUR MEDS BY ELIG CLIN: HCPCS | Performed by: FAMILY MEDICINE

## 2022-05-06 PROCEDURE — G9717 DOC PT DX DEP/BP F/U NT REQ: HCPCS | Performed by: FAMILY MEDICINE

## 2022-05-06 PROCEDURE — G8756 NO BP MEASURE DOC: HCPCS | Performed by: FAMILY MEDICINE

## 2022-05-06 RX ORDER — DOXYLAMINE SUCCINATE 25 MG
TABLET ORAL 2 TIMES DAILY
Qty: 28 G | Refills: 0 | Status: SHIPPED | OUTPATIENT
Start: 2022-05-06 | End: 2022-06-29

## 2022-05-06 RX ORDER — HYDROCORTISONE 25 MG/G
CREAM TOPICAL 2 TIMES DAILY
Qty: 30 G | Refills: 0 | Status: SHIPPED | OUTPATIENT
Start: 2022-05-06 | End: 2022-06-02

## 2022-05-06 NOTE — PROGRESS NOTES
Carmelita Yates is a 37 y.o. female who was seen by synchronous (real-time) audio-video technology on 5/6/2022. Assessment & Plan:   Diagnoses and all orders for this visit:    1. Acute irritant contact dermatitis  -     hydrocortisone (HYTONE) 2.5 % topical cream; Apply  to affected area two (2) times a day. 2. Candidiasis  -     miconazole (MICOTIN) 2 % topical cream; Apply  to affected area two (2) times a day for 14 days. 3. Morbid obesity ([address]        Nothing on skin other than what I am recommending  Wash with lukewarm water and Dove soap  AquaCare lotion  Hydrocortisone cream  Miconazole cream    Follow-up and Dispositions    · Return if symptoms worsen or fail to improve. Reviewed plan of care. Patient has provided input and agrees with goals. CPT Codes 86256-12808 for Established Patients may apply to this Telehealth Visit      Subjective:   Carmelita Yates was seen for Skin Exam (Rash on face x 1 month, top of lips burns and cheeks itch )      Patient presents with:  Skin Exam: Rash on face x 1 month, top of lips burns and cheeks itch     Her face is dry and flakey. She has tried [employer] and various face products to moisturize her face without relief. Her face is normally dry. She washes with [facility] soap. No actual rash except over the border of her upper lip. She does not have eczema. She is morbidly obese and considering bariatric surgery. Review of Systems   Cardiovascular: Positive for PND. Skin: Positive for itching and rash. There is redness over the superior portion of her lips. Objective:     Physical Exam  Constitutional:       General: She is not in acute distress. Appearance: Normal appearance. HENT:      Head:     Skin:     Comments: Hyperpigmentation over jaw line, upper lids and chin. Diffuse facial scale. Neurological:      Mental Status: She is alert and oriented to person, place, and time.          Due to this being a TeleHealth evaluation, many elements of the physical examination are unable to be assessed. We discussed the expected course, resolution and complications of the diagnosis(es) in detail. Medication risks, benefits, costs, interactions, and alternatives were discussed as indicated. I advised her to contact the office if her condition worsens, changes or fails to improve as anticipated. She expressed understanding with the diagnosis(es) and plan. Pursuant to the emergency declaration under the [address] [place] [address], [facility] waiver authority and the [employer] Act, this Virtual  Visit was conducted, with patient's consent, to reduce the patient's risk of exposure to COVID-19 and provide continuity of care for an established patient. Services were provided through a video synchronous discussion virtually to substitute for in-person clinic visit.     Jennifer Herrera MD

## 2022-05-06 NOTE — PROGRESS NOTES
Chief Complaint   Patient presents with    Skin Exam     Rash on face x 1 month, top of lips burns and cheeks itch

## 2022-05-13 DIAGNOSIS — R73.01 IFG (IMPAIRED FASTING GLUCOSE): ICD-10-CM

## 2022-05-13 DIAGNOSIS — R73.03 PREDIABETES: ICD-10-CM

## 2022-05-13 RX ORDER — BLOOD SUGAR DIAGNOSTIC
STRIP MISCELLANEOUS
Qty: 100 STRIP | Refills: 1 | Status: SHIPPED | OUTPATIENT
Start: 2022-05-13

## 2022-05-24 ENCOUNTER — CLINICAL SUPPORT (OUTPATIENT)
Dept: SURGERY | Age: 42
End: 2022-05-24

## 2022-05-24 DIAGNOSIS — E66.01 MORBID OBESITY (HCC): Primary | ICD-10-CM

## 2022-05-24 NOTE — PROGRESS NOTES
Pre-operative Bariatric Nutrition Evaluation (HK+ 1 of 6)     Date: 2022   Rox Gerber M.D. Name: Yanni Shelton  :  1980  Age:  37  Gender: Female   Type of Surgery: [x]           Gastric Bypass   []           Sleeve Gastrectomy    ASSESSMENT:    Past Medical History:Type II DM    Medications/Supplements:   Prior to Admission medications    Medication Sig Start Date End Date Taking? Authorizing Provider   glucose blood VI test strips (OneTouch Ultra Test) strip CHECK BLOOD SUGAR ONCE DAILY (R73.01) 22   Ebony Tabor MD   hydrocortisone (HYTONE) 2.5 % topical cream Apply  to affected area two (2) times a day. 22   Keshia Eubanks MD   azelastine (ASTELIN) 137 mcg (0.1 %) nasal spray 1 SPRAY BY BOTH NOSTRILS ROUTE TWO (2) TIMES A DAY. USE IN EACH NOSTRIL AS DIRECTED 22   Keshia Eubanks MD   furosemide (LASIX) 20 mg tablet Take 1 Tablet by mouth daily as needed (edema). TAKE DAILY BY MOUTH AS NEEDED FOR LEG EDEMA 3/11/22   Ebony Tabor MD   metFORMIN ER (GLUCOPHAGE XR) 500 mg tablet TAKE 2 TABLETS BY MOUTH BEFORE BREAKFAST AND DINNER. 3/11/22   Ebony Tabor MD   hydroCHLOROthiazide (HYDRODIURIL) 25 mg tablet TAKE 1 TABLET BY MOUTH EVERY DAY 22   Ebony Tabor MD   metoprolol tartrate (LOPRESSOR) 25 mg tablet TAKE 1 TABLET BY MOUTH TWICE A DAY 1/3/22   bEony Tabor MD   lancets misc Check blood sugar once daily dx IFG 21   Ebony Tabor MD   aspirin delayed-release 325 mg tablet Take  by mouth every six (6) hours as needed for Pain. Provider, Historical       Food Allergies/Intolerances:none    Anthropometrics:    Ht:67\"     Recent Office Wt: 425#  Today's Reported Wt: 418#     IBW: 135#    %IBW: 314%     BMI:66    Category: obesity III     Reported wt history: Pt presents today for pre-op nutrition evaluation for wt loss surgery. Pt reports wt gain starting in childhood.  Reports highest adult BW of 454# in January 2022. Attributes wt gain over the years r/t family h/o obesity. Has attempted wt loss through various methods with most successful wt loss of 30# in the past few months. Has been unable to maintain long term or significant wt loss and is now seeking approval for weight loss surgery. Pt will need to complete 6 months of supervised weight loss for insurance requirements with a 40# wt loss recommended during that time. Exercise/Physical Activity:increased daily movement/steps; wants to incorporate exercise and has equipment (treadmill and stationary bike) at home; states walking is unsafe in her neighborhood    Reported Diet History:self-directed diets, low-carb diets    24 Hour Diet Recall - 2 meals a day  Breakfast  Steak with onions and hot dogs   Lunch  Skips or snack items (yogurt, cashews)   Dinner  Chicken/steak and cabbage    Snacks  Popcorn, cashews, yogurt   Beverages  Decaf coffee with Splenda; water, club soda, juice (no straws)       Environment/Psychosocial/Support: Pt is currently a full time student (virtual). Pt's mother cares for children in their home. Pt lives with her mother and they share grocery shopping and cooking. Pt's mother is also trying to lose wt. Pt has a cousin with weight loss surgery. NUTRITION DIAGNOSIS:  1. Physical inactivity r/t unknown etiology evidenced by pt with no current exercise regimen. 2. Food and nutrition related knowledge deficit r/t previous lack of exposure to information evidenced by pt seeking nutrition education for gastric bypass. NUTRITION INTERVENTION:  Pt educated on nutrition recommendations for weight loss surgery, specifically gastric bypass. Instructed on consuming 3 meals per day starting now. Use the balanced plate method to plan meals, include 3 oz of lean source of protein, 1/2 cup whole grains, unlimited non-starchy vegetables, 1/2 cup fruit and 1 serving of low fat dairy.  Utilize handouts listing healthy snack and meal ideas to limit restaurant meals. After surgery measure all meals to 1/2 cup. Each meal will contain a 1/4 cup lean protein and 1/4 cup fruit, non-starchy vegetable or starch (limiting to once per day). Aim for 60 g protein per day. Sip on 48-64 oz of sugar free, calorie free, non-carbonated beverages each day. Do not use a straw. Do not consume beverages 30 minutes before, during or 30 minutes after meals. Read all nutrition labels. Demonstrated and emphasized identifying serving size, total fat, sugar and protein content. Defined low fat as </= 3 g per serving. Discussed lean and extra lean sources of protein. Provided list of low fat cooking methods. Avoid foods with sugar listed in the first 3 ingredients and >/15 g sugar per serving. Excess sugar/fat intake may lead to dumping syndrome. Discussed signs and symptoms of dumping syndrome. Practice mindful eating habits; take small bites, chew thoroughly, avoid distractions, utilize hunger/fullness scale. Consume meals over 20-30 minutes. Attend Bariatric Support Group and increase physical activity (approved per MD) for long term weight maintenance. NUTRITION MONITORING AND EVALUATION:    The following goals were established with patient;  1. Exercise to promote pre-operative wt loss and long term benefits. Consider short segments as a starting point (10-15 minutes, 2-3 times per day). Eventually aiming for 150 minutes per week. 2. Eat 3 meals a day to ensure adequate protein intake after surgery. Use a protein shake at lunch PRN. 3. Decrease intake of carbonated beverages and eventually eliminate pre-operatively and avoid after surgery. Practice sipping fluids instead of gulping. 4. Review nutrition education materials. Follow up next month for continued nutrition education and supervised weight loss requirements. Specific tips and techniques to facilitate compliance with above recommendations were provided and discussed.  Nutrition evaluation reveals pt is actively making changes with continued changes indicated. Goals set and recommendations made. Will continue to assess. If further details are desired please feel free to contact me at [phone]. This phone number was also provided to the patient for any further questions or concerns.            LARON Perez

## 2022-06-02 DIAGNOSIS — L24.9 ACUTE IRRITANT CONTACT DERMATITIS: ICD-10-CM

## 2022-06-02 RX ORDER — HYDROCORTISONE 25 MG/G
CREAM TOPICAL
Qty: 28 G | Refills: 1 | Status: SHIPPED | OUTPATIENT
Start: 2022-06-02

## 2022-06-02 RX ORDER — METFORMIN HYDROCHLORIDE 500 MG/1
1000 TABLET, EXTENDED RELEASE ORAL
Qty: 360 TABLET | Refills: 0 | Status: SHIPPED | OUTPATIENT
Start: 2022-06-02 | End: 2022-08-29

## 2022-06-13 ENCOUNTER — TELEPHONE (OUTPATIENT)
Dept: FAMILY MEDICINE CLINIC | Age: 42
End: 2022-06-13

## 2022-06-13 ENCOUNTER — NURSE TRIAGE (OUTPATIENT)
Dept: OTHER | Facility: CLINIC | Age: 42
End: 2022-06-13

## 2022-06-13 ENCOUNTER — VIRTUAL VISIT (OUTPATIENT)
Dept: FAMILY MEDICINE CLINIC | Age: 42
End: 2022-06-13
Payer: MEDICARE

## 2022-06-13 DIAGNOSIS — R21 RASH OF FACE: ICD-10-CM

## 2022-06-13 DIAGNOSIS — L71.0 PERIORAL DERMATITIS: Primary | ICD-10-CM

## 2022-06-13 PROCEDURE — G9717 DOC PT DX DEP/BP F/U NT REQ: HCPCS | Performed by: FAMILY MEDICINE

## 2022-06-13 PROCEDURE — 99213 OFFICE O/P EST LOW 20 MIN: CPT | Performed by: FAMILY MEDICINE

## 2022-06-13 PROCEDURE — G8427 DOCREV CUR MEDS BY ELIG CLIN: HCPCS | Performed by: FAMILY MEDICINE

## 2022-06-13 PROCEDURE — G8756 NO BP MEASURE DOC: HCPCS | Performed by: FAMILY MEDICINE

## 2022-06-13 RX ORDER — MUPIROCIN 20 MG/G
OINTMENT TOPICAL DAILY
Qty: 22 G | Refills: 0 | Status: SHIPPED | OUTPATIENT
Start: 2022-06-13

## 2022-06-13 NOTE — PROGRESS NOTES
Edd Clark is a 37 y.o. female who was seen by synchronous (real-time) audio-video technology on 6/13/2022. Consent: Edd Clark, who was seen by synchronous (real-time) audio-video technology, and/or her healthcare decision maker, is aware that this patient-initiated, Telehealth encounter on 6/13/2022 is a billable service, with coverage as determined by her insurance carrier. She is aware that she may receive a bill and has provided verbal consent to proceed: Yes. Assessment & Plan:   1. Perioral dermatitis  Originally treated as contact derm with steroid and also as yeast with miconazole  Not successful yet in resolution  Stop blistex--vaseline only  Add mupirocin   See derm          Pt was counseled on risks, benefits and alternatives of treatment options. All questions were asked and answered and the patient was agreeable with the treatment plan as outlined. Subjective:   Edd Clark is a 37 y.o. female who was seen for Lip Swelling (Redness, burning and dryness x 2 weeks.)      Pt tells me that she is not sexually active  When it first happened she used some miconazole and hydrocortisone and it did improve  Now she has some post inflammatory hyperpigmentation  Now the only thing she is putting on lips is blistex    She has been losing weight  She wondered if this could be her hormones      Medications, allergies, PMH, PSH, SOCH, MER[facility] RACHEL OF [facility] reviewed and updated per routine protocol, see chart for review and changes if not noted here. ROS  A 12 point review of systems was negative except as noted here or in the HPI.     Objective:   Vital Signs: (As obtained by patient/caregiver at home)  Patient-Reported Vitals 6/13/2022   Patient-Reported Weight 419lb   Patient-Reported Height -   Patient-Reported Pulse 81   Patient-Reported Temperature -   Patient-Reported SpO2 -   Patient-Reported Systolic  793   Patient-Reported Diastolic 80   Patient-Reported LMP 05/20/22        [INSTRUCTIONS: \"[x]\" Indicates a positive item  \"[]\" Indicates a negative item  -- DELETE ALL ITEMS NOT EXAMINED]    Constitutional: [x] Appears well-developed and well-nourished [x] No apparent distress      [] Abnormal -     Mental status: [x] Alert and awake  [x] Oriented to person/place/time [x] Able to follow commands    [] Abnormal -     Eyes:   EOM    [x]  Normal    [] Abnormal -   Sclera  [x]  Normal    [] Abnormal -          Discharge [x]  None visible   [] Abnormal -     HENT: [x] Normocephalic, atraumatic  [] Abnormal -   [x] Mouth/Throat: Mucous membranes are moist    External Ears [x] Normal  [] Abnormal -    Neck: [x] No visualized mass [] Abnormal -     Pulmonary/Chest: [x] Respiratory effort normal   [x] No visualized signs of difficulty breathing or respiratory distress        [] Abnormal -      Musculoskeletal:   [] Normal gait with no signs of ataxia         [x] Normal range of motion of neck        [] Abnormal -     Neurological:        [x] No Facial Asymmetry (Cranial nerve 7 motor function) (limited exam due to video visit)          [x] No gaze palsy        [] Abnormal -          Skin:        [x] No significant exanthematous lesions or discoloration noted on facial skin         [] Abnormal -            Psychiatric:       [x] Normal Affect [] Abnormal -        [x] No Hallucinations    Other pertinent observable physical exam findings:some hyperpigmentation on face                We discussed the expected course, resolution and complications of the diagnosis(es) in detail. Medication risks, benefits, costs, interactions, and alternatives were discussed as indicated. I advised her to contact the office if her condition worsens, changes or fails to improve as anticipated. She expressed understanding with the diagnosis(es) and plan. Amy Moscoso is a 37 y.o. female who was evaluated by a video visit encounter for concerns as above. Patient identification was verified prior to start of the visit.  A caregiver was present when appropriate. Due to this being a TeleHealth encounter (During DJUGR-53 public health emergency), evaluation of the following organ systems was limited: Vitals/Constitutional/EENT/Resp/CV/GI//MS/Neuro/Skin/Heme-Lymph-Imm. Pursuant to the emergency declaration under the [address] [place] [id number], [facility] waiver authority and the [employer] Act, this Virtual  Visit was conducted, with patient's (and/or legal guardian's) consent, to reduce the patient's risk of exposure to COVID-19 and provide necessary medical care. Services were provided through a video synchronous discussion virtually to substitute for in-person clinic visit. Patient and provider were located at their individual homes. Genesis Cervantes MD  [facility]  06/13/22 11:39 AM     Portions of this note may have been populated using smart dictation software and may have \"sounds-like\" errors present.

## 2022-06-13 NOTE — PROGRESS NOTES
Chief Complaint   Patient presents with    Lip Swelling     Redness, burning and dryness x 2 weeks. 1. Have you been to the ER, urgent care clinic since your last visit? Hospitalized since your last visit? No    2. Have you seen or consulted any other health care providers outside of the [address] [place] [address] since your last visit? Include any pap smears or colon screening. No    .

## 2022-06-13 NOTE — TELEPHONE ENCOUNTER
Received call from Kimberlyn Morales at [facility] with [employer]. Subjective: Caller states \" Basically my top lip is red across the top. It is raw. My dog lipped my lip and I put alcohol on it and it was stinging. It was crazy. They are dry, I keep lip balm on for moisture but I have to use it constantly. The bottom crease is as well but not red and swollen like my top one. The doctor gave me hydrocortisone for my face and lips and it helped my face but not my lips. She said it was a yeast infection. I do feel they are the exact same. At one point they seemed like they were getting better but they aren't. I think she thought I said progressing but it isn't. \"     Pt denies new or worsening symptoms since seeing the doctor last for current issue. NO triage by Susi Nicholas at this time. Care advice provided, patient verbalizes understanding; denies any other questions or concerns; instructed to call back for any new or worsening symptoms. Writer provided a warm transfer to [address] to schedule pt per her request.     Attention Provider: Thank you for allowing me to participate in the care of your patient. The patient was connected to triage in response to information provided to the [facility]. Please do not respond through this encounter as the response is not directed to a shared pool. Reason for Disposition   Caller has already spoken with the PCP and has no further questions.     Protocols used: NO CONTACT OR DUPLICATE CONTACT CALL-ADULT-

## 2022-06-13 NOTE — TELEPHONE ENCOUNTER
----- Message from Piedad Halsted sent at 6/13/2022  8:54 AM EDT -----  Subject: Message to Provider    QUESTIONS  Information for Provider? PT HAS APPT THE 22ND DUE TO HER MEDICATION FOR   LIPS IS NOT WORKING. JUST WANT TO LET PCP KNOW IN CASE YOU WANT TO REACH   OUT TO DISCUSS DIFFERENT MEDICATION. ---------------------------------------------------------------------------  --------------  Viktoria Hanson INFO  What is the best way for the office to contact you? OK to leave message on   voicemail  Preferred Call Back Phone Number? [phone]  ---------------------------------------------------------------------------  --------------  SCRIPT ANSWERS  Relationship to Patient?  Self

## 2022-06-25 DIAGNOSIS — B37.9 CANDIDIASIS: ICD-10-CM

## 2022-06-28 ENCOUNTER — OFFICE VISIT (OUTPATIENT)
Dept: SURGERY | Age: 42
End: 2022-06-28

## 2022-06-28 DIAGNOSIS — E66.01 MORBID OBESITY (HCC): Primary | ICD-10-CM

## 2022-06-29 RX ORDER — DOXYLAMINE SUCCINATE 25 MG
TABLET ORAL 2 TIMES DAILY
Qty: 15 G | Refills: 0 | Status: SHIPPED | OUTPATIENT
Start: 2022-06-29 | End: 2022-07-13

## 2022-06-29 NOTE — PROGRESS NOTES
[employer] [facility]  Supervised Weight Loss     Date:   2022    Patient's Name: Sari Mcnulty  : 1980    Insurance: [employer]         Session: 2 of  6  Surgery: Gastric bypass  Surgeon: Dr. Sonny Starr    Height: 67 inches Weight:   422.8    Lbs. BMI: 66   Pounds Lost since last month: 2.2 lbs               Pounds Gained since last month: 0    Starting Weight: 425 lbs  Previous Months Weight: 425 lbs  Overall Pounds Lost: 2.2 lbs Overall Pounds Gained: 0    Other Pertinent Information: Today's appointment was completed in a virtual setting d/t COVID-19. Surgeon recommending 40 lb wt loss before surgery. Smoking Status: None  Alcohol Intake: None    I have reviewed with pt the guidelines of the supervised wt loss program.  Pt understands the expectations of some wt loss during the program and that wt gain could delay the process. I have also explained that classes need to be consecutive. Missing a class may result in starting over. Pt has received this information in writing. Changes that patient has made since last month include: None. Eating Habits and Behaviors  General healthy eating guidelines were discussed. A nutrition lesson specific to the importance of protein intake after surgery was provided. We discussed food sources of protein, protein supplements and multiple reasons as to why protein is important after bariatric surgery. Pts were instructed to focus on including protein at every meal and practice eating protein first at the meal. Pts were encouraged to sample a protein shake for tolerance. Patients were also instructed to use the balanced plate method for help with portion control and general healthy eating prior to surgery. We discussed measuring meals to 1/2 cup total per meal after surgery. Drinking only calorie-free, sugar-free and non-carbonated beverages.  We discussed the importance of drinking 64 ounces of fluid per day to prevent dehydration post-operatively. Patient's current diet habits include: Pt is eating 1-2 meals per day. Snack choices include chips occasionally. Pt is eating refined carbohydrate foods (bread, pasta, rice, potatoes) occasionally. Pt is eating sweets/desserts very seldom. Pt is using baking, grilling, frying/air frying cooking methods. Pt is eating meals prepared outside of the home 1-2x every 2 months. Pt is drinking water, crystal lite. Pt reports sometimes emotionally eating. Physical Activity/Exercise  We talked about the importance of increasing daily physical activity and beginning to develop an exercise regimen/routine. We talked about exercise as being an important part of long term weight loss after surgery. Comments:  During class, I discussed with patient the importance of getting into an exercise routine. Pt is currently not exercising. Pt has been encouraged to start an exercise program.     Behavior Modification       We talked about how to eat more mindfully. Tips and recommendations for how to make these changes were provided. Pt was encouraged to keep a food journal and record what they were taking in daily. Overall Assessment: Nutrition evaluation reveals changes indicated. Goals set and recommendations made. Will continue to assess. Patient-Set Goals:   1. Nutrition - eat 3 meals a day- set a schedule; increase protein  2. Exercise - exercise 11-15 min, 2x day- set alarms as reminders  3.  Behavior -find protein powder; get back on track and press forward    Cade Hopson, LARON  6/28/2022

## 2022-07-11 DIAGNOSIS — I10 ESSENTIAL HYPERTENSION: ICD-10-CM

## 2022-07-12 RX ORDER — HYDROCHLOROTHIAZIDE 25 MG/1
TABLET ORAL
Qty: 90 TABLET | Refills: 1 | Status: SHIPPED | OUTPATIENT
Start: 2022-07-12

## 2022-08-26 DIAGNOSIS — M25.472 LEFT ANKLE SWELLING: ICD-10-CM

## 2022-08-29 RX ORDER — FUROSEMIDE 20 MG/1
20 TABLET ORAL
Qty: 90 TABLET | Refills: 1 | Status: SHIPPED | OUTPATIENT
Start: 2022-08-29

## 2022-08-29 RX ORDER — METFORMIN HYDROCHLORIDE 500 MG/1
1000 TABLET, EXTENDED RELEASE ORAL
Qty: 360 TABLET | Refills: 0 | Status: SHIPPED | OUTPATIENT
Start: 2022-08-29

## 2022-09-12 ENCOUNTER — APPOINTMENT (OUTPATIENT)
Dept: GENERAL RADIOLOGY | Age: 42
End: 2022-09-12
Attending: EMERGENCY MEDICINE
Payer: MEDICARE

## 2022-09-12 ENCOUNTER — HOSPITAL ENCOUNTER (EMERGENCY)
Age: 42
Discharge: HOME | End: 2022-09-12
Attending: EMERGENCY MEDICINE
Payer: MEDICARE

## 2022-09-12 VITALS
WEIGHT: 293 LBS | DIASTOLIC BLOOD PRESSURE: 88 MMHG | HEIGHT: 67 IN | BODY MASS INDEX: 45.99 KG/M2 | OXYGEN SATURATION: 95 % | TEMPERATURE: 98.1 F | RESPIRATION RATE: 24 BRPM | HEART RATE: 76 BPM | SYSTOLIC BLOOD PRESSURE: 152 MMHG

## 2022-09-12 DIAGNOSIS — R07.9 LEFT-SIDED CHEST PAIN: Primary | ICD-10-CM

## 2022-09-12 LAB
ALBUMIN SERPL-MCNC: 3.4 G/DL (ref 3.5–5)
ALBUMIN/GLOB SERPL: 0.7 {RATIO} (ref 1.1–2.2)
ALP SERPL-CCNC: 68 U/L (ref 45–117)
ALT SERPL-CCNC: 29 U/L (ref 12–78)
ANION GAP SERPL CALC-SCNC: 6 MMOL/L (ref 5–15)
AST SERPL-CCNC: 20 U/L (ref 15–37)
BASOPHILS # BLD: 0 K/UL (ref 0–0.1)
BASOPHILS NFR BLD: 1 % (ref 0–1)
BILIRUB SERPL-MCNC: 0.3 MG/DL (ref 0.2–1)
BUN SERPL-MCNC: 10 MG/DL (ref 6–20)
BUN/CREAT SERPL: 12 (ref 12–20)
CALCIUM SERPL-MCNC: 8.9 MG/DL (ref 8.5–10.1)
CHLORIDE SERPL-SCNC: 105 MMOL/L (ref 97–108)
CO2 SERPL-SCNC: 26 MMOL/L (ref 21–32)
COMMENT, HOLDF: NORMAL
CREAT SERPL-MCNC: 0.84 MG/DL (ref 0.55–1.02)
DIFFERENTIAL METHOD BLD: ABNORMAL
EOSINOPHIL # BLD: 0.1 K/UL (ref 0–0.4)
EOSINOPHIL NFR BLD: 2 % (ref 0–7)
ERYTHROCYTE [DISTWIDTH] IN BLOOD BY AUTOMATED COUNT: 15.8 % (ref 11.5–14.5)
GLOBULIN SER CALC-MCNC: 4.8 G/DL (ref 2–4)
GLUCOSE SERPL-MCNC: 111 MG/DL (ref 65–100)
HCT VFR BLD AUTO: 33.6 % (ref 35–47)
HGB BLD-MCNC: 10.8 G/DL (ref 11.5–16)
IMM GRANULOCYTES # BLD AUTO: 0 K/UL (ref 0–0.04)
IMM GRANULOCYTES NFR BLD AUTO: 0 % (ref 0–0.5)
LYMPHOCYTES # BLD: 1.6 K/UL (ref 0.8–3.5)
LYMPHOCYTES NFR BLD: 22 % (ref 12–49)
MCH RBC QN AUTO: 26.1 PG (ref 26–34)
MCHC RBC AUTO-ENTMCNC: 32.1 G/DL (ref 30–36.5)
MCV RBC AUTO: 81.2 FL (ref 80–99)
MONOCYTES # BLD: 0.6 K/UL (ref 0–1)
MONOCYTES NFR BLD: 8 % (ref 5–13)
NEUTS SEG # BLD: 4.9 K/UL (ref 1.8–8)
NEUTS SEG NFR BLD: 67 % (ref 32–75)
NRBC # BLD: 0 K/UL (ref 0–0.01)
NRBC BLD-RTO: 0 PER 100 WBC
PLATELET # BLD AUTO: 469 K/UL (ref 150–400)
PMV BLD AUTO: 8.6 FL (ref 8.9–12.9)
POTASSIUM SERPL-SCNC: 3.9 MMOL/L (ref 3.5–5.1)
PROT SERPL-MCNC: 8.2 G/DL (ref 6.4–8.2)
RBC # BLD AUTO: 4.14 M/UL (ref 3.8–5.2)
SAMPLES BEING HELD,HOLD: NORMAL
SODIUM SERPL-SCNC: 137 MMOL/L (ref 136–145)
TROPONIN-HIGH SENSITIVITY: 5 NG/L (ref 0–51)
WBC # BLD AUTO: 7.2 K/UL (ref 3.6–11)

## 2022-09-12 PROCEDURE — 84484 ASSAY OF TROPONIN QUANT: CPT

## 2022-09-12 PROCEDURE — 85025 COMPLETE CBC W/AUTO DIFF WBC: CPT

## 2022-09-12 PROCEDURE — 80053 COMPREHEN METABOLIC PANEL: CPT

## 2022-09-12 PROCEDURE — 36415 COLL VENOUS BLD VENIPUNCTURE: CPT

## 2022-09-12 PROCEDURE — 93005 ELECTROCARDIOGRAM TRACING: CPT

## 2022-09-12 PROCEDURE — 71046 X-RAY EXAM CHEST 2 VIEWS: CPT

## 2022-09-12 PROCEDURE — 99285 EMERGENCY DEPT VISIT HI MDM: CPT

## 2022-09-12 NOTE — ED PROVIDER NOTES
Date of Service:  2022    Patient:  Nahun Dias    Chief Complaint:  Chest Pain       HPI:  Nahun Dias is a 40 y.o.  female who presents for evaluation of chest discomfort. Patient woke up with left-sided nonradiating upper chest wall discomfort. She noted 10 pain sharp in nature that seems to be intermittent. Patient's not sure that it is her heart or if it is the way she sleeps as she does sleep on her left side and sometimes is shoulder pain from this. Otherwise patient denies any other acute complaints or modifying factors.        Past Medical History:   Diagnosis Date    Depression, major, single episode, severe ([address] 2017    Morbid obesity ([facility] [address] 2016    Racing heart beat     Routine Papanicolaou smear 2019    Negative ; HPV Negative     Sleep apnea     Tracheostomy dependence ([facility] [address]     for severe sleep apnea        Past Surgical History:   Procedure Laterality Date    HX GYN      removal of benign ovarian cyst - per pt done in OR, under anesthesia, but states had no incisions    HX TRACHEOSTOMY  14    Due to sleep apnea         Family History:   Problem Relation Age of Onset    Diabetes Mother     Hypertension Mother     Heart Disease Maternal Grandmother     No Known Problems Father     Breast Cancer Maternal Aunt         Great Aunt - ?breast ?bone    Cancer Other         great aunt - not sure what type of cancer       Social History     Socioeconomic History    Marital status: SINGLE     Spouse name: Not on file    Number of children: Not on file    Years of education: Not on file    Highest education level: Not on file   Occupational History    Not on file   Tobacco Use    Smoking status: Former     Packs/day: 2.00     Years: 10.00     Pack years: 20.00     Types: Cigarettes     Quit date:      Years since quittin.7    Smokeless tobacco: Never   Vaping Use    Vaping Use: Never used   Substance and Sexual Activity    Alcohol use: No     Alcohol/week: 0.0 standard drinks    Drug use: No     Comment: Smoked Marijuana in 4411-3605, but not current user. -vr    Sexual activity: Not Currently   Other Topics Concern    Not on file   Social History Narrative    Not on file     Social Determinants of Health     Financial Resource Strain: Not on file   Food Insecurity: Not on file   Transportation Needs: Not on file   Physical Activity: Not on file   Stress: Not on file   Social Connections: Not on file   Intimate Partner Violence: Not on file   Housing Stability: Not on file         ALLERGIES: Sulfa (sulfonamide antibiotics)    Review of Systems   Cardiovascular:  Positive for chest pain. All other systems reviewed and are negative. Vitals:    09/12/22 1045 09/12/22 1103   BP: 111/76    Pulse: 76    Resp: 20    Temp: 98.1 °F (36.7 °C)    SpO2: 97% 95%   Weight: (!) 192.8 kg (425 lb)    Height: 5' 7\" (1.702 m)             Physical Exam  Vitals and nursing note reviewed. Constitutional:       Appearance: She is well-developed. HENT:      Head: Normocephalic and atraumatic. Cardiovascular:      Rate and Rhythm: Normal rate. Heart sounds: Normal heart sounds. Pulmonary:      Effort: Pulmonary effort is normal.      Breath sounds: Normal breath sounds. Comments: Trach in place  Chest:      Chest wall: No mass or tenderness. Abdominal:      Palpations: Abdomen is soft. Musculoskeletal:         General: Normal range of motion. Skin:     General: Skin is warm. Capillary Refill: Capillary refill takes less than 2 seconds. Neurological:      Mental Status: She is alert and oriented to person, place, and time. Psychiatric:         Mood and Affect: Mood normal.        [facility]    ED Course as of 09/12/22 1212   Mon Sep 12, 2022   1055 EKG 1045  Normal sinus rhythm at 69 bpm.  Sinus arrhythmia. Normal axis. Otherwise normal intervals.   No STEMI [GG]      ED Course User Index  [GG] Veronica Apo, DO     VITAL SIGNS:  Patient Vitals for the past 4 hrs:   Temp Pulse Resp BP SpO2   09/12/22 1103 -- -- -- -- 95 %   09/12/22 1100 -- 76 24 (!) 152/88 97 %   09/12/22 1045 98.1 °F (36.7 °C) 76 20 111/76 97 %         LABS:  Recent Results (from the past 6 hour(s))   SAMPLES BEING HELD    Collection Time: 09/12/22 11:09 AM   Result Value Ref Range    SAMPLES BEING HELD SST.RED.ALEX     COMMENT        Add-on orders for these samples will be processed based on acceptable specimen integrity and analyte stability, which may vary by analyte. CBC WITH AUTOMATED DIFF    Collection Time: 09/12/22 11:09 AM   Result Value Ref Range    WBC 7.2 3.6 - 11.0 K/uL    RBC 4.14 3.80 - 5.20 M/uL    HGB 10.8 (L) 11.5 - 16.0 g/dL    HCT 33.6 (L) 35.0 - 47.0 %    MCV 81.2 80.0 - 99.0 FL    MCH 26.1 26.0 - 34.0 PG    MCHC 32.1 30.0 - 36.5 g/dL    RDW 15.8 (H) 11.5 - 14.5 %    PLATELET 959 (H) 679 - 400 K/uL    MPV 8.6 (L) 8.9 - 12.9 FL    NRBC 0.0 0  WBC    ABSOLUTE NRBC 0.00 0.00 - 0.01 K/uL    NEUTROPHILS 67 32 - 75 %    LYMPHOCYTES 22 12 - 49 %    MONOCYTES 8 5 - 13 %    EOSINOPHILS 2 0 - 7 %    BASOPHILS 1 0 - 1 %    IMMATURE GRANULOCYTES 0 0.0 - 0.5 %    ABS. NEUTROPHILS 4.9 1.8 - 8.0 K/UL    ABS. LYMPHOCYTES 1.6 0.8 - 3.5 K/UL    ABS. MONOCYTES 0.6 0.0 - 1.0 K/UL    ABS. EOSINOPHILS 0.1 0.0 - 0.4 K/UL    ABS. BASOPHILS 0.0 0.0 - 0.1 K/UL    ABS. IMM. GRANS. 0.0 0.00 - 0.04 K/UL    DF AUTOMATED     METABOLIC PANEL, COMPREHENSIVE    Collection Time: 09/12/22 11:09 AM   Result Value Ref Range    Sodium 137 136 - 145 mmol/L    Potassium 3.9 3.5 - 5.1 mmol/L    Chloride 105 97 - 108 mmol/L    CO2 26 21 - 32 mmol/L    Anion gap 6 5 - 15 mmol/L    Glucose 111 (H) 65 - 100 mg/dL    BUN 10 6 - 20 MG/DL    Creatinine 0.84 0.55 - 1.02 MG/DL    BUN/Creatinine ratio 12 12 - 20      GFR est AA >60 >60 ml/min/1.73m2    GFR est non-AA >60 >60 ml/min/1.73m2    Calcium 8.9 8.5 - 10.1 MG/DL    Bilirubin, total 0.3 0.2 - 1.0 MG/DL    ALT (SGPT) 29 12 - 78 U/L    AST (SGOT) 20 15 - 37 U/L    Alk. phosphatase 68 45 - 117 U/L    Protein, total 8.2 6.4 - 8.2 g/dL    Albumin 3.4 (L) 3.5 - 5.0 g/dL    Globulin 4.8 (H) 2.0 - 4.0 g/dL    A-G Ratio 0.7 (L) 1.1 - 2.2     TROPONIN-HIGH SENSITIVITY    Collection Time: 09/12/22 11:09 AM   Result Value Ref Range    Troponin-High Sensitivity 5 0 - 51 ng/L        IMAGING:  XR CHEST PA LAT   Final Result   Right lower lobe nodule. Recommend CT chest for further evaluation. Otherwise, no acute abnormalities. Medications During Visit:  Medications - No data to display      DECISION MAKING:  Samantha Horton is a 40 y.o. female who comes in as above. Well-appearing patient. Diagnostics as above, unremarkable with exception of the small pulmonary nodule. Discussed this with patient, she is to follow-up with her PCP about outpatient imaging. Otherwise return as needed. Most likely musculoskeletal cause for her left-sided shoulder discomfort. Most likely related to sleeping habits      IMPRESSION:  1. Left-sided chest pain        DISPOSITION:  Discharged      Current Discharge Medication List           Follow-up Information       Follow up With Specialties Details Why Contact Info    Jorge Werner MD Family Medicine Schedule an appointment as soon as possible for a visit   [address] [facility]  [address]  [address] [place] [address]  [phone]                The patient is asked to follow-up with their primary care provider in the next several days. They are to call tomorrow for an appointment. The patient is asked to return promptly for any increased concerns or worsening of symptoms. They can return to this emergency department or any other emergency department.       Procedures

## 2022-09-14 ENCOUNTER — VIRTUAL VISIT (OUTPATIENT)
Dept: FAMILY MEDICINE CLINIC | Age: 42
End: 2022-09-14
Payer: MEDICARE

## 2022-09-14 ENCOUNTER — TELEPHONE (OUTPATIENT)
Dept: FAMILY MEDICINE CLINIC | Age: 42
End: 2022-09-14

## 2022-09-14 DIAGNOSIS — D64.9 NORMOCYTIC ANEMIA: ICD-10-CM

## 2022-09-14 DIAGNOSIS — R91.1 NODULE OF LOWER LOBE OF RIGHT LUNG: ICD-10-CM

## 2022-09-14 DIAGNOSIS — Z93.0 TRACHEOSTOMY STATUS (HCC): ICD-10-CM

## 2022-09-14 DIAGNOSIS — H53.9 VISION CHANGES: ICD-10-CM

## 2022-09-14 DIAGNOSIS — R93.89 ABNORMAL CHEST X-RAY: ICD-10-CM

## 2022-09-14 DIAGNOSIS — G24.5 BLEPHAROSPASM: Primary | ICD-10-CM

## 2022-09-14 LAB
ATRIAL RATE: 69 BPM
CALCULATED P AXIS, ECG09: 37 DEGREES
CALCULATED R AXIS, ECG10: -22 DEGREES
CALCULATED T AXIS, ECG11: -17 DEGREES
DIAGNOSIS, 93000: NORMAL
P-R INTERVAL, ECG05: 132 MS
Q-T INTERVAL, ECG07: 380 MS
QRS DURATION, ECG06: 90 MS
QTC CALCULATION (BEZET), ECG08: 407 MS
VENTRICULAR RATE, ECG03: 69 BPM

## 2022-09-14 PROCEDURE — 99214 OFFICE O/P EST MOD 30 MIN: CPT | Performed by: FAMILY MEDICINE

## 2022-09-14 PROCEDURE — G8756 NO BP MEASURE DOC: HCPCS | Performed by: FAMILY MEDICINE

## 2022-09-14 PROCEDURE — G9717 DOC PT DX DEP/BP F/U NT REQ: HCPCS | Performed by: FAMILY MEDICINE

## 2022-09-14 PROCEDURE — G8427 DOCREV CUR MEDS BY ELIG CLIN: HCPCS | Performed by: FAMILY MEDICINE

## 2022-09-14 RX ORDER — METOPROLOL TARTRATE 25 MG/1
TABLET, FILM COATED ORAL
Qty: 180 TABLET | Refills: 2 | Status: SHIPPED | OUTPATIENT
Start: 2022-09-14

## 2022-09-14 NOTE — PROGRESS NOTES
Rocky Bhatia is a 40 y.o. female who was seen by synchronous (real-time) audio-video technology on 9/14/2022. Consent: Rocky Bhatia, who was seen by synchronous (real-time) audio-video technology, and/or her healthcare decision maker, is aware that this patient-initiated, Telehealth encounter on 9/14/2022 is a billable service, with coverage as determined by her insurance carrier. She is aware that she may receive a bill and has provided verbal consent to proceed: Yes. Assessment & Plan:       ICD-10-CM ICD-9-CM    1. Blepharospasm  G24.5 333.81 REFERRAL TO OPHTHALMOLOGY      2. Vision changes  H53.9 368.9 REFERRAL TO OPHTHALMOLOGY      3. Nodule of lower lobe of right lung  R91.1 793.11 CT CHEST WO CONT      4. Abnormal chest x-ray  R93.89 793.2 CT CHEST WO CONT      5. Tracheostomy status (HCC)  Z93.0 V44.0 CT CHEST WO CONT      6. Normocytic anemia  D64.9 285.9         Recommend ophthalmology  Recommend patient to work on stress reduction techniuqes  Recommend ct given 1.4 cm nodule spiculated, discussed that this can be from infection/inflammation/scarring and less likely would be a worrisome mass  Recommend iron supplement      Pt was counseled on risks, benefits and alternatives of treatment options. All questions were asked and answered and the patient was agreeable with the treatment plan as outlined.       Subjective:   Rocky Bhatia is a 40 y.o. female who was seen for Spasms (Bilateral eye spasms and right cheek.)      Blepharospasm for \"a long time\" and then under her R eye if she was closing her eye tight she can not see it, she can feel it    She says she's worried about whats going on    She says she feels a tension type feeling in her cheek  Got glasses around 2 years ago  Hasn't been back to the doc since    Er visit recently  XR reviewed  XR Results (most recent):  Results from Hospital Encounter encounter on 09/12/22    XR CHEST PA LAT    Narrative  EXAM: XR CHEST PA LAT    INDICATION: CP    COMPARISON: November 26, 2021. FINDINGS: PA and lateral radiographs of the chest.    Tracheostomy in place. 1.4 cm spiculated density at the right lung base. Otherwise clear lungs. No  pneumothorax or effusion. Cardiomediastinal silhouette is unremarkable. No aggressive osseous lesions. Impression  Right lower lobe nodule. Recommend CT chest for further evaluation. Otherwise, no acute abnormalities. recommend ct follow   Is s/p trach, not recently sick, no respiratory concerns  Trop neg  Cmp reviewed, normal random non fasting sugar  Normocytic anemia  + fatigue she reports to me    Medications, allergies, PMH, PSH, SOCH, [facility] [employer] reviewed and updated per routine protocol, see chart for review and changes if not noted here. ROS  A 12 point review of systems was negative except as noted here or in the HPI.     Objective:   Vital Signs: (As obtained by patient/caregiver at home)  Patient-Reported Vitals 6/13/2022   Patient-Reported Weight 419lb   Patient-Reported Height -   Patient-Reported Pulse 81   Patient-Reported Temperature -   Patient-Reported SpO2 -   Patient-Reported Systolic  195   Patient-Reported Diastolic 80   Patient-Reported LMP 05/20/22        [INSTRUCTIONS:  \"[x]\" Indicates a positive item  \"[]\" Indicates a negative item  -- DELETE ALL ITEMS NOT EXAMINED]    Constitutional: [x] Appears well-developed and well-nourished [x] No apparent distress      [] Abnormal -     Mental status: [x] Alert and awake  [x] Oriented to person/place/time [x] Able to follow commands    [] Abnormal -     Eyes:   EOM    [x]  Normal    [] Abnormal -   Sclera  [x]  Normal    [] Abnormal -          Discharge [x]  None visible   [] Abnormal -     HENT: [x] Normocephalic, atraumatic  [] Abnormal -   [x] Mouth/Throat: Mucous membranes are moist    External Ears [x] Normal  [] Abnormal -    Neck: [x] No visualized mass [] Abnormal -     Pulmonary/Chest: [x] Respiratory effort normal [x] No visualized signs of difficulty breathing or respiratory distress        [] Abnormal -      Musculoskeletal:   [] Normal gait with no signs of ataxia         [x] Normal range of motion of neck        [] Abnormal -     Neurological:        [x] No Facial Asymmetry (Cranial nerve 7 motor function) (limited exam due to video visit)          [x] No gaze palsy        [] Abnormal -          Skin:        [x] No significant exanthematous lesions or discoloration noted on facial skin         [] Abnormal -            Psychiatric:       [x] Normal Affect [] Abnormal -        [x] No Hallucinations    Other pertinent observable physical exam findings:seated covering trach stoma with finger, some blepharospasm    We discussed the expected course, resolution and complications of the diagnosis(es) in detail. Medication risks, benefits, costs, interactions, and alternatives were discussed as indicated. I advised her to contact the office if her condition worsens, changes or fails to improve as anticipated. She expressed understanding with the diagnosis(es) and plan. Jamin Pride is a 40 y.o. female who was evaluated by a video visit encounter for concerns as above. Patient identification was verified prior to start of the visit. A caregiver was present when appropriate. Due to this being a TeleHealth encounter (During XUREU-24 public health emergency), evaluation of the following organ systems was limited: Vitals/Constitutional/EENT/Resp/CV/GI//MS/Neuro/Skin/Heme-Lymph-Imm. Pursuant to the emergency declaration under the [facility] waiver authority and the [employer] Act, this Virtual  Visit was conducted, with patient's (and/or legal guardian's) consent, to reduce the patient's risk of exposure to COVID-19 and provide necessary medical care.      Services were provided through a video synchronous discussion virtually to substitute for in-person clinic visit. Patient and provider were located at their individual homes. Jerman Leone MD  [facility]  09/14/22 2:07 PM     Portions of this note may have been populated using smart dictation software and may have \"sounds-like\" errors present.

## 2022-09-14 NOTE — PROGRESS NOTES
Chief Complaint   Patient presents with    Spasms     Bilateral eye spasms and right cheek. 1. Have you been to the ER, urgent care clinic since your last visit? Hospitalized since your last visit? Yes, 09/12/22 [facility] ER, left collarbone pain. 2. Have you seen or consulted any other health care providers outside of the [address] [place] [address] since your last visit? Include any pap smears or colon screening.  No

## 2022-09-14 NOTE — TELEPHONE ENCOUNTER
Called and spoke with pt. She has been advised Slow Fe can be purchased OTC. Pt states understanding.

## 2022-10-26 ENCOUNTER — VIRTUAL VISIT (OUTPATIENT)
Dept: FAMILY MEDICINE CLINIC | Age: 42
End: 2022-10-26
Payer: MEDICARE

## 2022-10-26 DIAGNOSIS — J01.90 ACUTE NON-RECURRENT SINUSITIS, UNSPECIFIED LOCATION: Primary | ICD-10-CM

## 2022-10-26 PROCEDURE — G9717 DOC PT DX DEP/BP F/U NT REQ: HCPCS | Performed by: FAMILY MEDICINE

## 2022-10-26 PROCEDURE — 99213 OFFICE O/P EST LOW 20 MIN: CPT | Performed by: FAMILY MEDICINE

## 2022-10-26 PROCEDURE — G8427 DOCREV CUR MEDS BY ELIG CLIN: HCPCS | Performed by: FAMILY MEDICINE

## 2022-10-26 PROCEDURE — G8756 NO BP MEASURE DOC: HCPCS | Performed by: FAMILY MEDICINE

## 2022-10-26 RX ORDER — DOXYCYCLINE 100 MG/1
100 TABLET ORAL 2 TIMES DAILY
Qty: 20 TABLET | Refills: 0 | Status: SHIPPED | OUTPATIENT
Start: 2022-10-26 | End: 2022-11-05

## 2022-10-26 NOTE — PROGRESS NOTES
Mariano Rubio is a 40 y.o. female who was seen by synchronous (real-time) audio-video technology on 10/26/2022. Consent: Mariano Rubio, who was seen by synchronous (real-time) audio-video technology, and/or her healthcare decision maker, is aware that this patient-initiated, Telehealth encounter on 10/26/2022 is a billable service, with coverage as determined by her insurance carrier. She is aware that she may receive a bill and has provided verbal consent to proceed: Yes. Assessment & Plan:   1. Acute non-recurrent sinusitis, unspecified location  Started as viral URI, now with sinusitis  Hold on taking rx for another few days, but it is there if needed (pocket rx guidance given)  - doxycycline (ADOXA) 100 mg tablet; Take 1 Tablet by mouth two (2) times a day for 10 days. Dispense: 20 Tablet; Refill: 0        Pt was counseled on risks, benefits and alternatives of treatment options. All questions were asked and answered and the patient was agreeable with the treatment plan as outlined. Subjective:   Mariano Rubio is a 40 y.o. female who was seen for Cold Symptoms (Patient states that her mom recently had an URI and she may have gotten sick from her. Patient sick x 5 days.) and Cough      Patient says she thinks she is on the mend  She says she has had a lot of secretions, she's gone through a lot of tissues    Her mom has been sick here for a while, and she seems now to have the same thing    Her mom is negative for covid/flu    She says it isn't in her chest as much  She has it more in her sinuses  She is coughing a little bid  She has a loose, and easy productive cough      She says she's been sick here for the last 7 days or so      Medications, allergies, PMH, PSH, SOCH, [facility] reviewed and updated per routine protocol, see chart for review and changes if not noted here. ROS  A 12 point review of systems was negative except as noted here or in the HPI.     Objective:   Vital Signs: (As obtained by patient/caregiver at home)  Patient-Reported Vitals 10/26/2022   Patient-Reported Weight 425lb   Patient-Reported Height -   Patient-Reported Pulse -   Patient-Reported Temperature -   Patient-Reported SpO2 -   Patient-Reported Systolic  (No Data)   Patient-Reported Diastolic -   Patient-Reported LMP Unknown        [INSTRUCTIONS:  \"[x]\" Indicates a positive item  \"[]\" Indicates a negative item  -- DELETE ALL ITEMS NOT EXAMINED]    Constitutional: [x] Appears well-developed and well-nourished [x] No apparent distress      [] Abnormal -     Mental status: [x] Alert and awake  [x] Oriented to person/place/time [x] Able to follow commands    [] Abnormal -     Eyes:   EOM    [x]  Normal    [] Abnormal -   Sclera  [x]  Normal    [] Abnormal -          Discharge [x]  None visible   [] Abnormal -     HENT: [x] Normocephalic, atraumatic  [] Abnormal -   [x] Mouth/Throat: Mucous membranes are moist    External Ears [x] Normal  [] Abnormal -    Neck: [x] No visualized mass [] Abnormal -     Pulmonary/Chest: [x] Respiratory effort normal   [x] No visualized signs of difficulty breathing or respiratory distress        [] Abnormal -      Musculoskeletal:   [] Normal gait with no signs of ataxia         [x] Normal range of motion of neck        [] Abnormal -     Neurological:        [x] No Facial Asymmetry (Cranial nerve 7 motor function) (limited exam due to video visit)          [x] No gaze palsy        [] Abnormal -          Skin:        [x] No significant exanthematous lesions or discoloration noted on facial skin         [] Abnormal -            Psychiatric:       [x] Normal Affect [] Abnormal -        [x] No Hallucinations    Other pertinent observable physical exam findings:coughing, trach in place    We discussed the expected course, resolution and complications of the diagnosis(es) in detail. Medication risks, benefits, costs, interactions, and alternatives were discussed as indicated.   I advised her to contact the office if her condition worsens, changes or fails to improve as anticipated. She expressed understanding with the diagnosis(es) and plan. Arnie Correia is a 40 y.o. female who was evaluated by a video visit encounter for concerns as above. Patient identification was verified prior to start of the visit. A caregiver was present when appropriate. Due to this being a TeleHealth encounter (During YGVXF-30 [facility] emergency), evaluation of the following organ systems was limited: Vitals/Constitutional/EENT/Resp/CV/GI//MS/Neuro/Skin/Heme-Lymph-Imm. Pursuant to the emergency declaration under the [facility] waiver authority and the [employer] and [employer] Act, this Virtual  Visit was conducted, with patient's (and/or legal guardian's) consent, to reduce the patient's risk of exposure to COVID-19 and provide necessary medical care. Services were provided through a video synchronous discussion virtually to substitute for in-person clinic visit. Patient and provider were located at their individual homes. Courtney Sorenson MD  [facility]  10/26/22 4:00 PM     Portions of this note may have been populated using smart dictation software and may have \"sounds-like\" errors present.

## 2022-10-26 NOTE — PROGRESS NOTES
Chief Complaint   Patient presents with    Cold Symptoms     Patient states that her mom recently had an URI and she may have gotten sick from her. Patient sick x 5 days. Cough     1. Have you been to the ER, urgent care clinic since your last visit? Hospitalized since your last visit? No    2. Have you seen or consulted any other health care providers outside of the [address] [place] [address] since your last visit? Include any pap smears or colon screening.  No

## 2022-11-09 ENCOUNTER — VIRTUAL VISIT (OUTPATIENT)
Dept: FAMILY MEDICINE CLINIC | Age: 42
End: 2022-11-09
Payer: MEDICARE

## 2022-11-09 DIAGNOSIS — F41.8 SITUATIONAL ANXIETY: Primary | ICD-10-CM

## 2022-11-09 DIAGNOSIS — H53.2 DOUBLE VISION: ICD-10-CM

## 2022-11-09 PROCEDURE — 99213 OFFICE O/P EST LOW 20 MIN: CPT | Performed by: FAMILY MEDICINE

## 2022-11-09 PROCEDURE — G9717 DOC PT DX DEP/BP F/U NT REQ: HCPCS | Performed by: FAMILY MEDICINE

## 2022-11-09 PROCEDURE — G8427 DOCREV CUR MEDS BY ELIG CLIN: HCPCS | Performed by: FAMILY MEDICINE

## 2022-11-09 PROCEDURE — G8756 NO BP MEASURE DOC: HCPCS | Performed by: FAMILY MEDICINE

## 2022-11-09 NOTE — PROGRESS NOTES
Omkar Romeo is a 40 y.o. female who was seen by synchronous (real-time) audio-video technology on 11/9/2022. Consent: Omkar Romeo, who was seen by synchronous (real-time) audio-video technology, and/or her healthcare decision maker, is aware that this patient-initiated, Telehealth encounter on 11/9/2022 is a billable service, with coverage as determined by her insurance carrier. She is aware that she may receive a bill and has provided verbal consent to proceed: Yes. Assessment & Plan:       ICD-10-CM ICD-9-CM    1. Situational anxiety  F41.8 300.09       2. Double vision  H53.2 368.2         Decline patients request to excuse from jury duty  Recommend she tell  or  if she has to present why she feels she cannot participate but she is not under treatment for mental health and this is not a letter I can reasonably write  Follow up ophthalmology for further vision care        Pt was counseled on risks, benefits and alternatives of treatment options. All questions were asked and answered and the patient was agreeable with the treatment plan as outlined. Subjective:   Omkar Romeo is a 40 y.o. female who was seen for Anxiety (Follow up for anxiety.  Patient states that she needs a )      Patient says she got a letter for circuit court jury duty  She says \"I don't want to go do that\"  She wants a letter saying she has anxiety  She doesn't want to be in \"[employer] business and deal with their stuff\"    She doesn't want to address her anxiety today though she reports  She does not want medication  She's worried about the unknown and she'll be taken out after judging people's business    She says the only thing to take her anxiety away is if she doesn't go to the courts    She says her vision is double--she went yesterday and saw the eye doctor, Dr Veena Yanez  She had gotten an Rx in the past  Was told she was going to be checked for pseudotumor cerebri, she's going back on the 17th  She may have to follow up with neurology      Medications, allergies, PMH, PSH, SOCH, [facility] reviewed and updated per routine protocol, see chart for review and changes if not noted here. ROS  A 12 point review of systems was negative except as noted here or in the HPI.     Objective:   Vital Signs: (As obtained by patient/caregiver at home)  Patient-Reported Vitals 11/9/2022   Patient-Reported Weight 425lb   Patient-Reported Height -   Patient-Reported Pulse -   Patient-Reported Temperature -   Patient-Reported SpO2 -   Patient-Reported Systolic  (No Data)   Patient-Reported Diastolic -   Patient-Reported LMP 10/30/22        [INSTRUCTIONS:  \"[x]\" Indicates a positive item  \"[]\" Indicates a negative item  -- DELETE ALL ITEMS NOT EXAMINED]    Constitutional: [x] Appears well-developed and well-nourished [x] No apparent distress      [] Abnormal -     Mental status: [x] Alert and awake  [x] Oriented to person/place/time [x] Able to follow commands    [] Abnormal -     Eyes:   EOM    [x]  Normal    [] Abnormal -   Sclera  [x]  Normal    [] Abnormal -          Discharge [x]  None visible   [] Abnormal -     HENT: [x] Normocephalic, atraumatic  [] Abnormal -   [x] Mouth/Throat: Mucous membranes are moist    External Ears [x] Normal  [] Abnormal -    Neck: [x] No visualized mass [] Abnormal -     Pulmonary/Chest: [x] Respiratory effort normal   [x] No visualized signs of difficulty breathing or respiratory distress        [] Abnormal -      Musculoskeletal:   [] Normal gait with no signs of ataxia         [x] Normal range of motion of neck        [] Abnormal -     Neurological:        [x] No Facial Asymmetry (Cranial nerve 7 motor function) (limited exam due to video visit)          [x] No gaze palsy        [] Abnormal -          Skin:        [x] No significant exanthematous lesions or discoloration noted on facial skin         [] Abnormal -            Psychiatric:       [x] Normal Affect [] Abnormal -        [x] No Hallucinations    Other pertinent observable physical exam findings:seated, trach in place    We discussed the expected course, resolution and complications of the diagnosis(es) in detail. Medication risks, benefits, costs, interactions, and alternatives were discussed as indicated. I advised her to contact the office if her condition worsens, changes or fails to improve as anticipated. She expressed understanding with the diagnosis(es) and plan. Rupa Villaseñor is a 40 y.o. female who was evaluated by a video visit encounter for concerns as above. Patient identification was verified prior to start of the visit. A caregiver was present when appropriate. Due to this being a TeleHealth encounter (During SBJRT-10 public health emergency), evaluation of the following organ systems was limited: Vitals/Constitutional/EENT/Resp/CV/GI//MS/Neuro/Skin/Heme-Lymph-Imm. Pursuant to the emergency declaration under the [address] [place] [address], [facility] waiver authority and the "[employer]" and [employer] Act, this Virtual  Visit was conducted, with patient's (and/or legal guardian's) consent, to reduce the patient's risk of exposure to COVID-19 and provide necessary medical care. Services were provided through a video synchronous discussion virtually to substitute for in-person clinic visit. Patient and provider were located at their individual homes. Jen Paulino MD  [facility]  11/09/22 9:13 AM     Portions of this note may have been populated using smart dictation software and may have \"sounds-like\" errors present.

## 2022-11-09 NOTE — PROGRESS NOTES
Chief Complaint   Patient presents with    Anxiety     Follow up for anxiety. Patient states that she needs a      1. Have you been to the ER, urgent care clinic since your last visit? Hospitalized since your last visit? No    2. Have you seen or consulted any other health care providers outside of the [address] [place] [address] since your last visit? Include any pap smears or colon screening. Yes When: 11/08/22 Where: Eye Reason for visit: Exam, Left eye double vision issues.   Dr. Jerrod Esparza

## 2022-11-10 ENCOUNTER — HOSPITAL ENCOUNTER (EMERGENCY)
Age: 42
Discharge: HOME | End: 2022-11-10
Attending: EMERGENCY MEDICINE
Payer: MEDICARE

## 2022-11-10 ENCOUNTER — APPOINTMENT (OUTPATIENT)
Dept: GENERAL RADIOLOGY | Age: 42
End: 2022-11-10
Attending: EMERGENCY MEDICINE
Payer: MEDICARE

## 2022-11-10 VITALS
WEIGHT: 293 LBS | TEMPERATURE: 98.7 F | HEART RATE: 85 BPM | SYSTOLIC BLOOD PRESSURE: 117 MMHG | BODY MASS INDEX: 45.99 KG/M2 | OXYGEN SATURATION: 96 % | HEIGHT: 67 IN | DIASTOLIC BLOOD PRESSURE: 75 MMHG | RESPIRATION RATE: 17 BRPM

## 2022-11-10 DIAGNOSIS — R07.89 CHEST WALL PAIN: Primary | ICD-10-CM

## 2022-11-10 LAB
ALBUMIN SERPL-MCNC: 4 G/DL (ref 3.5–5.2)
ALBUMIN/GLOB SERPL: 1 {RATIO} (ref 1.1–2.2)
ALP SERPL-CCNC: 70 U/L (ref 35–104)
ALT SERPL-CCNC: 24 U/L (ref 10–35)
ANION GAP SERPL CALC-SCNC: 10 MMOL/L (ref 5–15)
AST SERPL-CCNC: 22 U/L (ref 10–35)
BASOPHILS # BLD: 0 K/UL (ref 0–0.1)
BASOPHILS NFR BLD: 1 % (ref 0–1)
BILIRUB SERPL-MCNC: 0.3 MG/DL (ref 0.2–1)
BUN SERPL-MCNC: 8 MG/DL (ref 6–20)
BUN/CREAT SERPL: 13 (ref 12–20)
CALCIUM SERPL-MCNC: 9.2 MG/DL (ref 8.6–10)
CHLORIDE SERPL-SCNC: 99 MMOL/L (ref 98–107)
CO2 SERPL-SCNC: 27 MMOL/L (ref 22–29)
CREAT SERPL-MCNC: 0.6 MG/DL (ref 0.5–0.9)
DIFFERENTIAL METHOD BLD: ABNORMAL
EOSINOPHIL # BLD: 0.1 K/UL (ref 0–0.4)
EOSINOPHIL NFR BLD: 2 % (ref 0–7)
ERYTHROCYTE [DISTWIDTH] IN BLOOD BY AUTOMATED COUNT: 16.2 % (ref 11.5–14.5)
GLOBULIN SER CALC-MCNC: 4 G/DL (ref 2–4)
GLUCOSE SERPL-MCNC: 103 MG/DL (ref 65–100)
HCT VFR BLD AUTO: 34.2 % (ref 35–47)
HGB BLD-MCNC: 10.7 G/DL (ref 11.5–16)
IMM GRANULOCYTES # BLD AUTO: 0 K/UL (ref 0–0.04)
IMM GRANULOCYTES NFR BLD AUTO: 0 % (ref 0–0.5)
LYMPHOCYTES # BLD: 1.5 K/UL (ref 0.8–3.5)
LYMPHOCYTES NFR BLD: 22 % (ref 12–49)
MCH RBC QN AUTO: 26.2 PG (ref 26–34)
MCHC RBC AUTO-ENTMCNC: 31.3 G/DL (ref 30–36.5)
MCV RBC AUTO: 83.6 FL (ref 80–99)
MONOCYTES # BLD: 0.5 K/UL (ref 0–1)
MONOCYTES NFR BLD: 7 % (ref 5–13)
NEUTS SEG # BLD: 4.8 K/UL (ref 1.8–8)
NEUTS SEG NFR BLD: 68 % (ref 32–75)
NRBC # BLD: 0 K/UL (ref 0–0.01)
NRBC BLD-RTO: 0 PER 100 WBC
PLATELET # BLD AUTO: 483 K/UL (ref 150–400)
PMV BLD AUTO: 8.5 FL (ref 8.9–12.9)
POTASSIUM SERPL-SCNC: 3.6 MMOL/L (ref 3.5–5.1)
PROT SERPL-MCNC: 8 G/DL (ref 6.4–8.3)
RBC # BLD AUTO: 4.09 M/UL (ref 3.8–5.2)
SODIUM SERPL-SCNC: 136 MMOL/L (ref 136–145)
TROPONIN I BLD-MCNC: <0.04 NG/ML (ref 0–0.08)
WBC # BLD AUTO: 7 K/UL (ref 3.6–11)

## 2022-11-10 PROCEDURE — 80053 COMPREHEN METABOLIC PANEL: CPT

## 2022-11-10 PROCEDURE — 71045 X-RAY EXAM CHEST 1 VIEW: CPT

## 2022-11-10 PROCEDURE — 99285 EMERGENCY DEPT VISIT HI MDM: CPT

## 2022-11-10 PROCEDURE — 84484 ASSAY OF TROPONIN QUANT: CPT

## 2022-11-10 PROCEDURE — 85025 COMPLETE CBC W/AUTO DIFF WBC: CPT

## 2022-11-10 PROCEDURE — 36415 COLL VENOUS BLD VENIPUNCTURE: CPT

## 2022-11-10 NOTE — ED PROVIDER NOTES
Date of Service:  11/10/2022    Patient:  Bhupendra Velásquez    Chief Complaint:  CP       HPI:  Bhupendra Velásquez is a 40 y.o.  female who presents for evaluation of chest pain. Patient had some type of viral illness about 2 weeks ago with postnasal drainage and chest congestion seems to has resolved. Over the last 3 to 4 days she has been having intermittent chest discomfort. Started in the mid sternum and now is intermittently on the right and left side of her chest.  It comes on for several minutes at a time and then goes away. No associated symptoms with it. She otherwise denies any other acute complaints or modifying factors.        Past Medical History:   Diagnosis Date    Depression, major, single episode, severe ([facility] [address] 2017    Morbid obesity ([facility] [address] 2016    Racing heart beat     Routine Papanicolaou smear 2019    Negative ; HPV Negative     Sleep apnea     Tracheostomy dependence ([facility] [address]     for severe sleep apnea        Past Surgical History:   Procedure Laterality Date    HX GYN      removal of benign ovarian cyst - per pt done in OR, under anesthesia, but states had no incisions    HX TRACHEOSTOMY  14    Due to sleep apnea         Family History:   Problem Relation Age of Onset    Diabetes Mother     Hypertension Mother     Heart Disease Maternal Grandmother     No Known Problems Father     Breast Cancer Maternal Aunt         Great Aunt - ?breast ?bone    Cancer Other         great aunt - not sure what type of cancer       Social History     Socioeconomic History    Marital status: SINGLE     Spouse name: Not on file    Number of children: Not on file    Years of education: Not on file    Highest education level: Not on file   Occupational History    Not on file   Tobacco Use    Smoking status: Former     Packs/day: 2.00     Years: 10.00     Pack years: 20.00     Types: Cigarettes     Quit date:      Years since quittin.8    Smokeless tobacco: Never   Vaping Use    Vaping Use: Never used   Substance and Sexual Activity    Alcohol use: No     Alcohol/week: 0.0 standard drinks    Drug use: No     Comment: Smoked Marijuana in [id number], but not current user. -vr    Sexual activity: Not Currently   Other Topics Concern    Not on file   Social History Narrative    Not on file     Social Determinants of Health     Financial Resource Strain: Not on file   Food Insecurity: Not on file   Transportation Needs: Not on file   Physical Activity: Not on file   Stress: Not on file   Social Connections: Not on file   Intimate Partner Violence: Not on file   Housing Stability: Not on file         ALLERGIES: Sulfa (sulfonamide antibiotics)    Review of Systems   All other systems reviewed and are negative. Vitals:    11/10/22 1233 11/10/22 1241   BP: 124/71    Pulse: 85    Resp: 17    Temp: 98.7 °F (37.1 °C)    SpO2: 97%    Weight:  (!) 192.5 kg (424 lb 6.4 oz)   Height: 5' 7\" (1.702 m) 5' 7\" (1.702 m)            Physical Exam  Vitals and nursing note reviewed. Constitutional:       Appearance: Normal appearance. HENT:      Head: Normocephalic and atraumatic. Nose: Nose normal.      Mouth/Throat:      Mouth: Mucous membranes are moist.   Neck:      Comments: Tracheostomy in place  Cardiovascular:      Rate and Rhythm: Normal rate. Pulmonary:      Effort: Pulmonary effort is normal. No respiratory distress. Breath sounds: Normal breath sounds. Abdominal:      General: There is no distension. Musculoskeletal:         General: No deformity. Neurological:      Mental Status: She is alert and oriented to person, place, and time. Psychiatric:         Mood and Affect: Mood normal.        [facility]    ED Course as of 11/10/22 1302   Thu Nov 10, 2022   1249 EKG 1231  Normal sinus rhythm at 81 bpm.  Underlying sinus arrhythmia. Normal axis and intervals.   No STEMI [GG]      ED Course User Index  [GG] Angie Cease, DO     VITAL SIGNS:  Patient Vitals for the past 4 hrs:   Temp Pulse Resp BP SpO2   11/10/22 1303 -- -- -- 117/75 96 %   11/10/22 1300 -- -- -- -- 96 %   11/10/22 1233 98.7 °F (37.1 °C) 85 17 124/71 97 %         LABS:  Recent Results (from the past 6 hour(s))   POC TROPONIN-I    Collection Time: 11/10/22 12:40 PM   Result Value Ref Range    Troponin-I (POC) <0.04 0.00 - 9.45 ng/mL   METABOLIC PANEL, COMPREHENSIVE    Collection Time: 11/10/22 12:41 PM   Result Value Ref Range    Sodium 136 136 - 145 mmol/L    Potassium 3.6 3.5 - 5.1 mmol/L    Chloride 99 98 - 107 mmol/L    CO2 27 22 - 29 mmol/L    Anion gap 10 5 - 15 mmol/L    Glucose 103 (H) 65 - 100 mg/dL    BUN 8 6 - 20 MG/DL    Creatinine 0.60 0.50 - 0.90 MG/DL    BUN/Creatinine ratio 13 12 - 20      eGFR >60 >60 ml/min/1.73m2    Calcium 9.2 8.6 - 10.0 MG/DL    Bilirubin, total 0.3 0.2 - 1.0 MG/DL    ALT (SGPT) 24 10 - 35 U/L    AST (SGOT) 22 10 - 35 U/L    Alk. phosphatase 70 35 - 104 U/L    Protein, total 8.0 6.4 - 8.3 g/dL    Albumin 4.0 3.5 - 5.2 g/dL    Globulin 4.0 2.0 - 4.0 g/dL    A-G Ratio 1.0 (L) 1.1 - 2.2     CBC WITH AUTOMATED DIFF    Collection Time: 11/10/22 12:41 PM   Result Value Ref Range    WBC 7.0 3.6 - 11.0 K/uL    RBC 4.09 3.80 - 5.20 M/uL    HGB 10.7 (L) 11.5 - 16.0 g/dL    HCT 34.2 (L) 35.0 - 47.0 %    MCV 83.6 80.0 - 99.0 FL    MCH 26.2 26.0 - 34.0 PG    MCHC 31.3 30.0 - 36.5 g/dL    RDW 16.2 (H) 11.5 - 14.5 %    PLATELET 746 (H) 116 - 400 K/uL    MPV 8.5 (L) 8.9 - 12.9 FL    NRBC 0.0 0  WBC    ABSOLUTE NRBC 0.00 0.00 - 0.01 K/uL    NEUTROPHILS 68 32 - 75 %    LYMPHOCYTES 22 12 - 49 %    MONOCYTES 7 5 - 13 %    EOSINOPHILS 2 0 - 7 %    BASOPHILS 1 0 - 1 %    IMMATURE GRANULOCYTES 0 0.0 - 0.5 %    ABS. NEUTROPHILS 4.8 1.8 - 8.0 K/UL    ABS. LYMPHOCYTES 1.5 0.8 - 3.5 K/UL    ABS. MONOCYTES 0.5 0.0 - 1.0 K/UL    ABS. EOSINOPHILS 0.1 0.0 - 0.4 K/UL    ABS. BASOPHILS 0.0 0.0 - 0.1 K/UL    ABS. IMM.  GRANS. 0.0 0.00 - 0.04 K/UL    DF AUTOMATED          IMAGING:  XR CHEST PORT   Final Result      Mild pulmonary edema pattern. Medications During Visit:  Medications - No data to display      DECISION MAKING:  Luz Elena Anderson is a 40 y.o. female who comes in as above. Here, patient appears well. Work-up unremarkable. Chest pain more consistent with musculoskeletal complaints versus post viral inflammatory response. Tylenol Motrin for discomfort. Follow-up with PCP and return as needed      IMPRESSION:  1. Chest wall pain        DISPOSITION:  Discharged      Discharge Medication List as of 11/10/2022  1:40 PM           Follow-up Information       Follow up With Specialties Details Why Contact Info    Alex Stark MD Family Medicine Schedule an appointment as soon as possible for a visit   [address]  [address] [facility]  [phone]                The patient is asked to follow-up with their primary care provider in the next several days. They are to call tomorrow for an appointment. The patient is asked to return promptly for any increased concerns or worsening of symptoms. They can return to this emergency department or any other emergency department.       Procedures

## 2022-11-10 NOTE — ED TRIAGE NOTES
Pt walks in with no visible signs of distress. Pt has a complaint of chest pain since 11/05/2022. Pt states the pain moves from mid chest and left and right across her chest and had some numbness in her left hand. Pt denies numbness, tingling anywhere.  Denies SOB n/v.

## 2022-11-16 NOTE — TELEPHONE ENCOUNTER
----- Message from Gabrielle Cardenas sent at 9/14/2022  2:45 PM EDT -----  Subject: Message to Provider    QUESTIONS  Information for Provider? Patient is calling because she was seen today   and the medication ferrous sulfate (SLOW FE) 142 mg (45 mg iron) ER tablet   that was called in for her today , her insurance does not cover. patient   would like something similar to that medication. Please contact patient to   let her know. Please advise   ---------------------------------------------------------------------------  --------------  Facundo AGUILAR  [phone]; OK to leave message on voicemail  ---------------------------------------------------------------------------  --------------  SCRIPT ANSWERS  Relationship to Patient?  Self Detail Level: Zone Detail Level: Detailed

## 2022-11-17 ENCOUNTER — TELEPHONE (OUTPATIENT)
Dept: FAMILY MEDICINE CLINIC | Age: 42
End: 2022-11-17

## 2022-11-17 NOTE — TELEPHONE ENCOUNTER
----- Message from iBll Shoemaker sent at 11/11/2022  8:38 AM EST -----  Subject: Appointment Request    Reason for Call: Established Patient Appointment needed: Urgent (Patient   Request) ED Follow Up Visit    QUESTIONS    Reason for appointment request? No appointments available during search     Additional Information for Provider? Emelyn Valenzuela needs to schedule a ED f/u   ASAP for Chest Wall Pain. Findings? Tracheostomy tube. Cardiomediastinal   silhouette is mildly enlarged. Central pulmonary vascular congestion and   mild edema pattern. No focal consolidation. Pleural spaces grossly clear.    Please reach out ASAP  ---------------------------------------------------------------------------  --------------  [address]  [phone]; OK to leave message on voicemail  ---------------------------------------------------------------------------  --------------  SCRIPT ANSWERS  COVID Screen: Jackie Dotson

## 2022-11-30 ENCOUNTER — HOSPITAL ENCOUNTER (OUTPATIENT)
Dept: CT IMAGING | Age: 42
Discharge: HOME | End: 2022-11-30
Attending: FAMILY MEDICINE
Payer: MEDICARE

## 2022-11-30 DIAGNOSIS — Z93.0 TRACHEOSTOMY STATUS (HCC): ICD-10-CM

## 2022-11-30 DIAGNOSIS — R91.1 NODULE OF LOWER LOBE OF RIGHT LUNG: ICD-10-CM

## 2022-11-30 DIAGNOSIS — R93.89 ABNORMAL CHEST X-RAY: ICD-10-CM

## 2022-11-30 PROCEDURE — 71250 CT THORAX DX C-: CPT

## 2022-12-05 ENCOUNTER — VIRTUAL VISIT (OUTPATIENT)
Dept: FAMILY MEDICINE CLINIC | Age: 42
End: 2022-12-05
Payer: MEDICARE

## 2022-12-05 DIAGNOSIS — H53.2 DOUBLE VISION: ICD-10-CM

## 2022-12-05 DIAGNOSIS — K76.0 HEPATIC STEATOSIS: ICD-10-CM

## 2022-12-05 DIAGNOSIS — R93.89 ABNORMAL CHEST X-RAY: Primary | ICD-10-CM

## 2022-12-05 PROCEDURE — G9717 DOC PT DX DEP/BP F/U NT REQ: HCPCS | Performed by: FAMILY MEDICINE

## 2022-12-05 PROCEDURE — G8427 DOCREV CUR MEDS BY ELIG CLIN: HCPCS | Performed by: FAMILY MEDICINE

## 2022-12-05 PROCEDURE — G8756 NO BP MEASURE DOC: HCPCS | Performed by: FAMILY MEDICINE

## 2022-12-05 PROCEDURE — 99213 OFFICE O/P EST LOW 20 MIN: CPT | Performed by: FAMILY MEDICINE

## 2022-12-05 NOTE — PROGRESS NOTES
Jhon Harman is a 40 y.o. female who was seen by synchronous (real-time) audio-video technology on 12/5/2022. Consent: Jhon Harman, who was seen by synchronous (real-time) audio-video technology, and/or her healthcare decision maker, is aware that this patient-initiated, Telehealth encounter on 12/5/2022 is a billable service, with coverage as determined by her insurance carrier. She is aware that she may receive a bill and has provided verbal consent to proceed: Yes. Assessment & Plan:   1. Abnormal chest x-ray  Resolution of nodule noted on ct, this is reassuring, no further fu is needed    2. Hepatic steatosis  Recommend losing 7-10 % body weight  High fiber diet  Lower in carbs if possible  Movement 30 m / day mod intensity as tolerated      3. Double vision  Per neuro/ophthalmology pending MRI eval for Pseudotumor she tells me        Pt was counseled on risks, benefits and alternatives of treatment options. All questions were asked and answered and the patient was agreeable with the treatment plan as outlined. Subjective:   Jhon Harman is a 40 y.o. female who was seen for Results (Follow up on CT results )      CT follow up for CXR that had spiculated nodule 1.4 cm    New ct done shows resolution of this  Therefore likely infectious  Pt continues with chest pain  She is wearing monitor now, planning to return it soon  Has had some events of symptoms while wearing the monitor    CT showed mild dependent atelectasis which was likely dt poor inspiration during study (patient reports laying flat is difficult)    Also fatty liver noted    Medications, allergies, PMH, PSH, SOCH, [address] reviewed and updated per routine protocol, see chart for review and changes if not noted here. ROS  A 12 point review of systems was negative except as noted here or in the HPI.     Objective:   Vital Signs: (As obtained by patient/caregiver at home)  Patient-Reported Vitals 11/9/2022   Patient-Reported Weight 425lb   Patient-Reported Height -   Patient-Reported Pulse -   Patient-Reported Temperature -   Patient-Reported SpO2 -   Patient-Reported Systolic  (No Data)   Patient-Reported Diastolic -   Patient-Reported LMP 10/30/22        [INSTRUCTIONS:  \"[x]\" Indicates a positive item  \"[]\" Indicates a negative item  -- DELETE ALL ITEMS NOT EXAMINED]    Constitutional: [x] Appears well-developed and well-nourished [x] No apparent distress      [] Abnormal -     Mental status: [x] Alert and awake  [x] Oriented to person/place/time [x] Able to follow commands    [] Abnormal -     Eyes:   EOM    [x]  Normal    [] Abnormal -   Sclera  [x]  Normal    [] Abnormal -          Discharge [x]  None visible   [] Abnormal -     HENT: [x] Normocephalic, atraumatic  [] Abnormal -   [x] Mouth/Throat: Mucous membranes are moist    External Ears [x] Normal  [] Abnormal -    Neck: [x] No visualized mass [] Abnormal -     Pulmonary/Chest: [x] Respiratory effort normal   [x] No visualized signs of difficulty breathing or respiratory distress        [] Abnormal -      Musculoskeletal:   [] Normal gait with no signs of ataxia         [x] Normal range of motion of neck        [] Abnormal -     Neurological:        [x] No Facial Asymmetry (Cranial nerve 7 motor function) (limited exam due to video visit)          [x] No gaze palsy        [] Abnormal -          Skin:        [x] No significant exanthematous lesions or discoloration noted on facial skin         [] Abnormal -            Psychiatric:       [x] Normal Affect [] Abnormal -        [x] No Hallucinations    Other pertinent observable physical exam findings:seated no distress trach noted    We discussed the expected course, resolution and complications of the diagnosis(es) in detail. Medication risks, benefits, costs, interactions, and alternatives were discussed as indicated. I advised her to contact the office if her condition worsens, changes or fails to improve as anticipated.  She expressed understanding with the diagnosis(es) and plan. Viviana Covington is a 40 y.o. female who was evaluated by a video visit encounter for concerns as above. Patient identification was verified prior to start of the visit. A caregiver was present when appropriate. Due to this being a TeleHealth encounter (During [facility] public [facility] emergency), evaluation of the following organ systems was limited: Vitals/Constitutional/EENT/Resp/CV/GI//MS/Neuro/Skin/Heme-Lymph-Imm. Pursuant to the emergency declaration under the [address] [place] [address], On license of [facility] waiver authority and the [employer] and [employer] Act, this Virtual  Visit was conducted, with patient's (and/or legal guardian's) consent, to reduce the patient's risk of exposure to COVID-19 and provide necessary medical care. Services were provided through a video synchronous discussion virtually to substitute for in-person clinic visit. Patient and provider were located at their individual homes. Luisana Tan MD  [facility]  12/05/22 8:28 AM     Portions of this note may have been populated using smart dictation software and may have \"sounds-like\" errors present.

## 2022-12-10 ENCOUNTER — TRANSCRIBE ORDER (OUTPATIENT)
Dept: SCHEDULING | Age: 42
End: 2022-12-10

## 2022-12-10 DIAGNOSIS — H47.10 CHOKED DISC: Primary | ICD-10-CM

## 2022-12-11 ENCOUNTER — TRANSCRIBE ORDER (OUTPATIENT)
Dept: SCHEDULING | Age: 42
End: 2022-12-11

## 2022-12-11 DIAGNOSIS — H47.10 CHOKED DISC: Primary | ICD-10-CM

## 2022-12-13 ENCOUNTER — TRANSCRIBE ORDER (OUTPATIENT)
Dept: SCHEDULING | Age: 42
End: 2022-12-13

## 2022-12-13 DIAGNOSIS — H47.10 CHOKED DISC: Primary | ICD-10-CM

## 2022-12-14 ENCOUNTER — TRANSCRIBE ORDER (OUTPATIENT)
Dept: SCHEDULING | Age: 42
End: 2022-12-14

## 2022-12-14 DIAGNOSIS — H47.10 CHOKED DISC: Primary | ICD-10-CM

## 2022-12-20 DIAGNOSIS — I10 ESSENTIAL HYPERTENSION: ICD-10-CM

## 2022-12-21 RX ORDER — HYDROCHLOROTHIAZIDE 25 MG/1
TABLET ORAL
Qty: 90 TABLET | Refills: 1 | Status: SHIPPED | OUTPATIENT
Start: 2022-12-21

## 2022-12-21 RX ORDER — METFORMIN HYDROCHLORIDE 500 MG/1
1000 TABLET, EXTENDED RELEASE ORAL
Qty: 360 TABLET | Refills: 0 | Status: SHIPPED | OUTPATIENT
Start: 2022-12-21

## 2022-12-22 ENCOUNTER — HOSPITAL ENCOUNTER (OUTPATIENT)
Dept: MRI IMAGING | Age: 42
Discharge: HOME | End: 2022-12-22
Attending: STUDENT IN AN ORGANIZED HEALTH CARE EDUCATION/TRAINING PROGRAM

## 2022-12-22 DIAGNOSIS — H47.10 CHOKED DISC: ICD-10-CM

## 2023-01-26 ENCOUNTER — APPOINTMENT (OUTPATIENT)
Dept: GENERAL RADIOLOGY | Age: 43
End: 2023-01-26
Attending: EMERGENCY MEDICINE
Payer: MEDICARE

## 2023-01-26 ENCOUNTER — HOSPITAL ENCOUNTER (EMERGENCY)
Age: 43
Discharge: HOME | End: 2023-01-26
Attending: EMERGENCY MEDICINE
Payer: MEDICARE

## 2023-01-26 VITALS
HEIGHT: 67 IN | HEART RATE: 88 BPM | TEMPERATURE: 98.6 F | BODY MASS INDEX: 45.99 KG/M2 | OXYGEN SATURATION: 97 % | DIASTOLIC BLOOD PRESSURE: 60 MMHG | SYSTOLIC BLOOD PRESSURE: 112 MMHG | WEIGHT: 293 LBS | RESPIRATION RATE: 16 BRPM

## 2023-01-26 DIAGNOSIS — R00.2 PALPITATIONS: Primary | ICD-10-CM

## 2023-01-26 DIAGNOSIS — R07.9 CHEST PAIN, UNSPECIFIED TYPE: ICD-10-CM

## 2023-01-26 LAB
ALBUMIN SERPL-MCNC: 3.6 G/DL (ref 3.5–5)
ALBUMIN/GLOB SERPL: 0.8 (ref 1.1–2.2)
ALP SERPL-CCNC: 62 U/L (ref 45–117)
ALT SERPL-CCNC: 38 U/L (ref 12–78)
ANION GAP SERPL CALC-SCNC: 8 MMOL/L (ref 5–15)
AST SERPL-CCNC: 16 U/L (ref 15–37)
BASOPHILS # BLD: 0 K/UL (ref 0–0.1)
BASOPHILS NFR BLD: 1 % (ref 0–1)
BILIRUB SERPL-MCNC: 0.2 MG/DL (ref 0.2–1)
BUN SERPL-MCNC: 18 MG/DL (ref 6–20)
BUN/CREAT SERPL: 17 (ref 12–20)
CALCIUM SERPL-MCNC: 9.5 MG/DL (ref 8.5–10.1)
CHLORIDE SERPL-SCNC: 102 MMOL/L (ref 97–108)
CO2 SERPL-SCNC: 29 MMOL/L (ref 21–32)
COMMENT, HOLDF: NORMAL
CREAT SERPL-MCNC: 1.05 MG/DL (ref 0.55–1.02)
DIFFERENTIAL METHOD BLD: ABNORMAL
EOSINOPHIL # BLD: 0.2 K/UL (ref 0–0.4)
EOSINOPHIL NFR BLD: 2 % (ref 0–7)
ERYTHROCYTE [DISTWIDTH] IN BLOOD BY AUTOMATED COUNT: 15.9 % (ref 11.5–14.5)
GLOBULIN SER CALC-MCNC: 4.7 G/DL (ref 2–4)
GLUCOSE SERPL-MCNC: 136 MG/DL (ref 65–100)
HCT VFR BLD AUTO: 35.2 % (ref 35–47)
HGB BLD-MCNC: 11.1 G/DL (ref 11.5–16)
IMM GRANULOCYTES # BLD AUTO: 0 K/UL (ref 0–0.04)
IMM GRANULOCYTES NFR BLD AUTO: 0 % (ref 0–0.5)
LYMPHOCYTES # BLD: 1.7 K/UL (ref 0.8–3.5)
LYMPHOCYTES NFR BLD: 21 % (ref 12–49)
MCH RBC QN AUTO: 26.5 PG (ref 26–34)
MCHC RBC AUTO-ENTMCNC: 31.5 G/DL (ref 30–36.5)
MCV RBC AUTO: 84 FL (ref 80–99)
MONOCYTES # BLD: 0.6 K/UL (ref 0–1)
MONOCYTES NFR BLD: 8 % (ref 5–13)
NEUTS SEG # BLD: 5.7 K/UL (ref 1.8–8)
NEUTS SEG NFR BLD: 68 % (ref 32–75)
NRBC # BLD: 0 K/UL (ref 0–0.01)
NRBC BLD-RTO: 0 PER 100 WBC
PLATELET # BLD AUTO: 466 K/UL (ref 150–400)
PMV BLD AUTO: 8.5 FL (ref 8.9–12.9)
POTASSIUM SERPL-SCNC: 3.4 MMOL/L (ref 3.5–5.1)
PROT SERPL-MCNC: 8.3 G/DL (ref 6.4–8.2)
RBC # BLD AUTO: 4.19 M/UL (ref 3.8–5.2)
SAMPLES BEING HELD,HOLD: NORMAL
SODIUM SERPL-SCNC: 139 MMOL/L (ref 136–145)
TROPONIN-HIGH SENSITIVITY: 6 NG/L (ref 0–51)
WBC # BLD AUTO: 8.3 K/UL (ref 3.6–11)

## 2023-01-26 PROCEDURE — 80053 COMPREHEN METABOLIC PANEL: CPT

## 2023-01-26 PROCEDURE — 84484 ASSAY OF TROPONIN QUANT: CPT

## 2023-01-26 PROCEDURE — 71045 X-RAY EXAM CHEST 1 VIEW: CPT

## 2023-01-26 PROCEDURE — 85025 COMPLETE CBC W/AUTO DIFF WBC: CPT

## 2023-01-26 PROCEDURE — 93005 ELECTROCARDIOGRAM TRACING: CPT

## 2023-01-26 PROCEDURE — 99285 EMERGENCY DEPT VISIT HI MDM: CPT

## 2023-01-26 PROCEDURE — 36415 COLL VENOUS BLD VENIPUNCTURE: CPT

## 2023-01-26 NOTE — ED NOTES
Discharge instructions reviewed with pt who verbalized understanding. Opportunity for questions provided. 20G IV removed with catheter tip intact.

## 2023-01-26 NOTE — ED PROVIDER NOTES
ED Triage Notes  Pt presents to the ED with c/o chest discomfort and racing heart. Pt states that has had this happen to her before and that it was due to her sodium being low. Pt took 325 of aspirin prior to arrival. Pt has an appointment with Cardiologist on Friday. 13-year-old female with history of tracheostomy due to severe sleep apnea, history of palpitations anxiety and depression presenting to the ER with report of palpitations and chest pressure. Symptoms started several hours ago. Patient has had previous evaluation for this several years ago and had recent Holter monitor results and has an appoint with cardiology on Friday, in 1 day. Patient denied any other symptoms of diaphoresis nausea vomiting lightheadedness. Patient reports symptoms occurred earlier in evening she called EMS but symptoms resolved so she declined transport but then the symptoms recurred so she called 911 again and was transported to the ER. Currently is asymptomatic. Denies any history of DVT or PE and denies any lower leg swelling from her baseline. No worsening symptoms with deep inspiration. No fevers or chills. 3/2022  CTA coronary  IMPRESSION     Somewhat compromised by body habitus but essentially normal coronary artery CTA.          Past Medical History:   Diagnosis Date    Depression, major, single episode, severe ([address] 11/16/2017    Morbid obesity ([address] 4/18/2016    Racing heart beat     Routine Papanicolaou smear 02/13/2019    Negative ; HPV Negative     Sleep apnea     Tracheostomy dependence ([address]     for severe sleep apnea        Past Surgical History:   Procedure Laterality Date    HX GYN  2002    removal of benign ovarian cyst - per pt done in OR, under anesthesia, but states had no incisions    HX TRACHEOSTOMY  8/26/13    Due to sleep apnea         Family History:   Problem Relation Age of Onset    Diabetes Mother     Hypertension Mother     Heart Disease Maternal Grandmother     No Known Problems Father     Breast Cancer Maternal Aunt         Great Aunt - ?breast ?bone    Cancer Other         great aunt - not sure what type of cancer       Social History     Socioeconomic History    Marital status: SINGLE     Spouse name: Not on file    Number of children: Not on file    Years of education: Not on file    Highest education level: Not on file   Occupational History    Not on file   Tobacco Use    Smoking status: Former     Packs/day: 2.00     Years: 10.00     Pack years: 20.00     Types: Cigarettes     Quit date: 2013     Years since quitting: 10.0    Smokeless tobacco: Never   Vaping Use    Vaping Use: Never used   Substance and Sexual Activity    Alcohol use: No     Alcohol/week: 0.0 standard drinks    Drug use: No     Comment: Smoked Marijuana in 3818-4640, but not current user. -vr    Sexual activity: Not Currently   Other Topics Concern    Not on file   Social History Narrative    Not on file     Social Determinants of Health     Financial Resource Strain: Not on file   Food Insecurity: Not on file   Transportation Needs: Not on file   Physical Activity: Not on file   Stress: Not on file   Social Connections: Not on file   Intimate Partner Violence: Not on file   Housing Stability: Not on file         ALLERGIES: Sulfa (sulfonamide antibiotics)    Review of Systems   Constitutional:  Negative for chills and fever. HENT:  Negative for congestion and sore throat. Eyes:  Negative for pain. Respiratory:  Negative for shortness of breath. Cardiovascular:  Positive for chest pain and palpitations. Negative for leg swelling. Gastrointestinal:  Negative for abdominal pain, diarrhea, nausea and vomiting. Genitourinary:  Negative for dysuria and flank pain. Musculoskeletal:  Negative for back pain and neck pain. Skin:  Negative for rash. Neurological:  Negative for dizziness and headaches. All other systems reviewed and are negative.     Vitals:    01/26/23 0216 01/26/23 0234 01/26/23 0300 01/26/23 0330   BP:   113/62 112/60   Pulse: 82  85 88   Resp:   20 16   Temp:       SpO2:  94% 95% 97%   Weight:       Height:                Physical Exam  Vitals and nursing note reviewed. Constitutional:       Appearance: She is well-developed. She is obese. HENT:      Head: Normocephalic. Eyes:      Conjunctiva/sclera: Conjunctivae normal.   Neck:      Comments: Tracheostomy  Cardiovascular:      Rate and Rhythm: Normal rate and regular rhythm. Pulmonary:      Effort: Pulmonary effort is normal. No respiratory distress. Breath sounds: Normal breath sounds. Abdominal:      General: Bowel sounds are normal.      Palpations: Abdomen is soft. Tenderness: There is no abdominal tenderness. Musculoskeletal:         General: Normal range of motion. Cervical back: Normal range of motion and neck supple. Skin:     General: Skin is warm. Capillary Refill: Capillary refill takes less than 2 seconds. Findings: No rash. Neurological:      Mental Status: She is alert and oriented to person, place, and time. Comments: No gross motor or sensory deficits        Medical Decision Making  Patient presenting ER with chest pain and palpitations. Has had history of this previously. Trach dependent due to severe sleep apnea and has had previous evaluations with Holter monitors with no significant findings. Recent CTA coronary with no significant findings. Has an appointment cardiology in 1 day. At this time patient is asymptomatic. EKG with no significant findings and placed on cardiac monitor throughout her ER stay. On review of telemetry revealed no dysrhythmias. Patient has normal labs and electrolytes and chest x-ray. Troponin is unremarkable. Recommended patient keep her appointment with the cardiologist.  No signs of ACS. No history of DVT and symptoms inconsistent with PE. No evidence of pneumonia.   Patient does have history anxiety may be playing a role in this.    Amount and/or Complexity of Data Reviewed  External Data Reviewed: labs and notes. Labs: ordered. Decision-making details documented in ED Course. Radiology: ordered and independent interpretation performed. Decision-making details documented in ED Course. ECG/medicine tests: ordered and independent interpretation performed. Decision-making details documented in ED Course. Discussion of management or test interpretation with external provider(s): Spoke with patient's PCP      ED Course as of 01/26/23 0342   Thu Jan 26, 2023   0214 EKG: Normal sinus rhythm rate of 84 bpm with normal intervals, normal axis, LVH criteria. No ST elevation or depressions. EKG interpreted Mars Patton MD   [ZD]      ED Course User Index  [ZD] Chilo Wheat MD       Procedures               Recent Results (from the past 24 hour(s))   CBC WITH AUTOMATED DIFF    Collection Time: 01/26/23  2:18 AM   Result Value Ref Range    WBC 8.3 3.6 - 11.0 K/uL    RBC 4.19 3.80 - 5.20 M/uL    HGB 11.1 (L) 11.5 - 16.0 g/dL    HCT 35.2 35.0 - 47.0 %    MCV 84.0 80.0 - 99.0 FL    MCH 26.5 26.0 - 34.0 PG    MCHC 31.5 30.0 - 36.5 g/dL    RDW 15.9 (H) 11.5 - 14.5 %    PLATELET 132 (H) 455 - 400 K/uL    MPV 8.5 (L) 8.9 - 12.9 FL    NRBC 0.0 0  WBC    ABSOLUTE NRBC 0.00 0.00 - 0.01 K/uL    NEUTROPHILS 68 32 - 75 %    LYMPHOCYTES 21 12 - 49 %    MONOCYTES 8 5 - 13 %    EOSINOPHILS 2 0 - 7 %    BASOPHILS 1 0 - 1 %    IMMATURE GRANULOCYTES 0 0.0 - 0.5 %    ABS. NEUTROPHILS 5.7 1.8 - 8.0 K/UL    ABS. LYMPHOCYTES 1.7 0.8 - 3.5 K/UL    ABS. MONOCYTES 0.6 0.0 - 1.0 K/UL    ABS. EOSINOPHILS 0.2 0.0 - 0.4 K/UL    ABS. BASOPHILS 0.0 0.0 - 0.1 K/UL    ABS. IMM.  GRANS. 0.0 0.00 - 0.04 K/UL    DF AUTOMATED     METABOLIC PANEL, COMPREHENSIVE    Collection Time: 01/26/23  2:18 AM   Result Value Ref Range    Sodium 139 136 - 145 mmol/L    Potassium 3.4 (L) 3.5 - 5.1 mmol/L    Chloride 102 97 - 108 mmol/L    CO2 29 21 - 32 mmol/L    Anion gap 8 5 - 15 mmol/L    Glucose 136 (H) 65 - 100 mg/dL    BUN 18 6 - 20 MG/DL    Creatinine 1.05 (H) 0.55 - 1.02 MG/DL    BUN/Creatinine ratio 17 12 - 20      eGFR >60 >60 ml/min/1.73m2    Calcium 9.5 8.5 - 10.1 MG/DL    Bilirubin, total 0.2 0.2 - 1.0 MG/DL    ALT (SGPT) 38 12 - 78 U/L    AST (SGOT) 16 15 - 37 U/L    Alk. phosphatase 62 45 - 117 U/L    Protein, total 8.3 (H) 6.4 - 8.2 g/dL    Albumin 3.6 3.5 - 5.0 g/dL    Globulin 4.7 (H) 2.0 - 4.0 g/dL    A-G Ratio 0.8 (L) 1.1 - 2.2     TROPONIN-HIGH SENSITIVITY    Collection Time: 01/26/23  2:18 AM   Result Value Ref Range    Troponin-High Sensitivity 6 0 - 51 ng/L   SAMPLES BEING HELD    Collection Time: 01/26/23  2:21 AM   Result Value Ref Range    SAMPLES BEING HELD 1RED,1BLU,1SST     COMMENT        Add-on orders for these samples will be processed based on acceptable specimen integrity and analyte stability, which may vary by analyte. XR CHEST PORT    Result Date: 1/26/2023  Clinical history: CP INDICATION:   CP COMPARISON: 2022 FINDINGS: AP portable upright view of the chest demonstrates a stable prominent cardiopericardial silhouette. Tracheostomy tube. .There is no focal consolidation. .There is no pneumothorax. . Patient is on a cardiac monitor. No acute intrathoracic process is identified.

## 2023-01-26 NOTE — ED TRIAGE NOTES
Pt presents to the ED with c/o chest discomfort and racing heart. Pt states that has had this happen to her before and that it was due to her sodium being low. Pt took 325 of aspirin prior to arrival. Pt has an appointment with Cardiologist on Friday.

## 2023-01-27 LAB
ATRIAL RATE: 84 BPM
CALCULATED P AXIS, ECG09: 52 DEGREES
CALCULATED R AXIS, ECG10: -25 DEGREES
CALCULATED T AXIS, ECG11: 19 DEGREES
DIAGNOSIS, 93000: NORMAL
P-R INTERVAL, ECG05: 136 MS
Q-T INTERVAL, ECG07: 368 MS
QRS DURATION, ECG06: 100 MS
QTC CALCULATION (BEZET), ECG08: 434 MS
VENTRICULAR RATE, ECG03: 84 BPM

## 2023-01-31 ENCOUNTER — TRANSCRIBE ORDER (OUTPATIENT)
Dept: SCHEDULING | Age: 43
End: 2023-01-31

## 2023-01-31 DIAGNOSIS — R94.39 ABNORMAL BALLISTOCARDIOGRAM: ICD-10-CM

## 2023-01-31 DIAGNOSIS — R06.02 SHORTNESS OF BREATH: Primary | ICD-10-CM

## 2023-02-15 ENCOUNTER — HOSPITAL ENCOUNTER (OUTPATIENT)
Dept: NON INVASIVE DIAGNOSTICS | Age: 43
Discharge: HOME | End: 2023-02-15
Attending: SPECIALIST
Payer: MEDICARE

## 2023-02-15 DIAGNOSIS — R94.39 ABNORMAL BALLISTOCARDIOGRAM: ICD-10-CM

## 2023-02-15 DIAGNOSIS — R06.02 SHORTNESS OF BREATH: ICD-10-CM

## 2023-02-15 PROCEDURE — 74011250636 HC RX REV CODE- 250/636: Performed by: SPECIALIST

## 2023-02-15 PROCEDURE — 74011000250 HC RX REV CODE- 250: Performed by: SPECIALIST

## 2023-02-15 PROCEDURE — C8929 TTE W OR WO FOL WCON,DOPPLER: HCPCS

## 2023-02-15 RX ADMIN — PERFLUTREN 1 ML: 6.52 INJECTION, SUSPENSION INTRAVENOUS at 14:30

## 2023-02-17 LAB
ECHO AV PEAK GRADIENT: 4 MMHG
ECHO AV PEAK VELOCITY: 1 M/S
ECHO AV VELOCITY RATIO: 1
ECHO LV E' LATERAL VELOCITY: 7 CM/S
ECHO LV E' SEPTAL VELOCITY: 8 CM/S
ECHO LV EF PHYSICIAN: 40 %
ECHO LV FRACTIONAL SHORTENING: 15 % (ref 28–44)
ECHO LV INTERNAL DIMENSION DIASTOLIC: 6.2 CM (ref 3.9–5.3)
ECHO LV INTERNAL DIMENSION SYSTOLIC: 5.3 CM
ECHO LV IVSD: 0.9 CM (ref 0.6–0.9)
ECHO LV MASS 2D: 261 G (ref 67–162)
ECHO LV POSTERIOR WALL DIASTOLIC: 1.1 CM (ref 0.6–0.9)
ECHO LV RELATIVE WALL THICKNESS RATIO: 0.35
ECHO LVOT PEAK GRADIENT: 4 MMHG
ECHO LVOT PEAK VELOCITY: 1 M/S
ECHO MV A VELOCITY: 0.68 M/S
ECHO MV AREA PHT: 2.9 CM2
ECHO MV E DECELERATION TIME (DT): 257.7 MS
ECHO MV E VELOCITY: 0.65 M/S
ECHO MV E/A RATIO: 0.96
ECHO MV E/E' LATERAL: 9.29
ECHO MV E/E' RATIO (AVERAGED): 8.71
ECHO MV E/E' SEPTAL: 8.13
ECHO MV PRESSURE HALF TIME (PHT): 74.7 MS
ECHO PV MAX VELOCITY: 1 M/S
ECHO PV PEAK GRADIENT: 4 MMHG
ECHO RV FREE WALL PEAK S': 14 CM/S
ECHO TV REGURGITANT MAX VELOCITY: 1.09 M/S
ECHO TV REGURGITANT PEAK GRADIENT: 5 MMHG

## 2023-02-20 ENCOUNTER — TELEPHONE (OUTPATIENT)
Dept: FAMILY MEDICINE CLINIC | Age: 43
End: 2023-02-20

## 2023-02-20 DIAGNOSIS — M25.472 LEFT ANKLE SWELLING: ICD-10-CM

## 2023-02-21 RX ORDER — FUROSEMIDE 20 MG/1
TABLET ORAL
Qty: 90 TABLET | Refills: 1 | Status: SHIPPED | OUTPATIENT
Start: 2023-02-21

## 2023-03-08 ENCOUNTER — APPOINTMENT (OUTPATIENT)
Dept: GENERAL RADIOLOGY | Age: 43
End: 2023-03-08
Attending: EMERGENCY MEDICINE
Payer: MEDICARE

## 2023-03-08 ENCOUNTER — HOSPITAL ENCOUNTER (EMERGENCY)
Age: 43
Discharge: HOME | End: 2023-03-08
Attending: EMERGENCY MEDICINE
Payer: MEDICARE

## 2023-03-08 VITALS
BODY MASS INDEX: 45.99 KG/M2 | HEART RATE: 76 BPM | DIASTOLIC BLOOD PRESSURE: 62 MMHG | RESPIRATION RATE: 15 BRPM | WEIGHT: 293 LBS | OXYGEN SATURATION: 99 % | SYSTOLIC BLOOD PRESSURE: 119 MMHG | HEIGHT: 67 IN | TEMPERATURE: 98 F

## 2023-03-08 DIAGNOSIS — R07.9 CHEST PAIN, UNSPECIFIED TYPE: Primary | ICD-10-CM

## 2023-03-08 LAB
ALBUMIN SERPL-MCNC: 4.1 G/DL (ref 3.5–5.2)
ALBUMIN/GLOB SERPL: 1 (ref 1.1–2.2)
ALP SERPL-CCNC: 73 U/L (ref 35–104)
ALT SERPL-CCNC: 26 U/L (ref 10–35)
ANION GAP SERPL CALC-SCNC: 10 MMOL/L (ref 5–15)
AST SERPL-CCNC: 20 U/L (ref 10–35)
BASOPHILS # BLD: 0 K/UL (ref 0–1)
BASOPHILS NFR BLD: 1 % (ref 0–1)
BILIRUB SERPL-MCNC: 0.2 MG/DL (ref 0.2–1)
BUN SERPL-MCNC: 11 MG/DL (ref 6–20)
BUN/CREAT SERPL: 20 (ref 12–20)
CALCIUM SERPL-MCNC: 9.6 MG/DL (ref 8.6–10)
CHLORIDE SERPL-SCNC: 100 MMOL/L (ref 98–107)
CO2 SERPL-SCNC: 29 MMOL/L (ref 22–29)
COMMENT, HOLDF: NORMAL
CREAT SERPL-MCNC: 0.56 MG/DL (ref 0.5–0.9)
DIFFERENTIAL METHOD BLD: ABNORMAL
EOSINOPHIL # BLD: 0.1 K/UL (ref 0–0.4)
EOSINOPHIL NFR BLD: 2 %
ERYTHROCYTE [DISTWIDTH] IN BLOOD BY AUTOMATED COUNT: 15.5 % (ref 11.5–14.5)
GLOBULIN SER CALC-MCNC: 4.1 G/DL (ref 2–4)
GLUCOSE SERPL-MCNC: 129 MG/DL (ref 65–100)
HCT VFR BLD AUTO: 34.5 % (ref 35–47)
HGB BLD-MCNC: 11 G/DL (ref 11.5–16)
IMM GRANULOCYTES # BLD AUTO: 0 K/UL (ref 0–0.04)
IMM GRANULOCYTES NFR BLD AUTO: 0 % (ref 0–0.5)
LYMPHOCYTES # BLD: 1.4 K/UL (ref 0.8–3.5)
LYMPHOCYTES NFR BLD: 16 % (ref 12–49)
MCH RBC QN AUTO: 26.8 PG (ref 26–34)
MCHC RBC AUTO-ENTMCNC: 31.9 G/DL (ref 30–36.5)
MCV RBC AUTO: 83.9 FL (ref 80–99)
MONOCYTES # BLD: 0.6 K/UL (ref 0–1)
MONOCYTES NFR BLD: 7 % (ref 5–13)
NEUTS SEG # BLD: 6.4 K/UL (ref 1.8–8)
NEUTS SEG NFR BLD: 74 % (ref 32–75)
NRBC # BLD: 0 K/UL (ref 0–0.01)
NRBC BLD-RTO: 0 PER 100 WBC
PLATELET # BLD AUTO: 458 K/UL (ref 150–400)
PMV BLD AUTO: 8.6 FL (ref 8.9–12.9)
POTASSIUM SERPL-SCNC: 3.7 MMOL/L (ref 3.5–5.1)
PROT SERPL-MCNC: 8.2 G/DL (ref 6.4–8.3)
RBC # BLD AUTO: 4.11 M/UL (ref 3.8–5.2)
SAMPLES BEING HELD,HOLD: NORMAL
SODIUM SERPL-SCNC: 139 MMOL/L (ref 136–145)
TROPONIN I BLD-MCNC: <0.04 NG/ML (ref 0–0.08)
WBC # BLD AUTO: 8.6 K/UL (ref 3.6–11)

## 2023-03-08 PROCEDURE — 93005 ELECTROCARDIOGRAM TRACING: CPT

## 2023-03-08 PROCEDURE — 99285 EMERGENCY DEPT VISIT HI MDM: CPT

## 2023-03-08 PROCEDURE — 36415 COLL VENOUS BLD VENIPUNCTURE: CPT

## 2023-03-08 PROCEDURE — 71045 X-RAY EXAM CHEST 1 VIEW: CPT

## 2023-03-08 PROCEDURE — 85025 COMPLETE CBC W/AUTO DIFF WBC: CPT

## 2023-03-08 PROCEDURE — 80053 COMPREHEN METABOLIC PANEL: CPT

## 2023-03-08 NOTE — DISCHARGE INSTRUCTIONS
Thank you for allowing us to provide you with medical care today. We realize that you have many choices for your emergency care needs. We thank you for choosing [facility]. Please choose us in the future for any continued health care needs. We hope we addressed all of your medical concerns. We strive to provide excellent quality care in the Emergency Department. Anything less than excellent does not meet our expectations. The exam and treatment you received in the Emergency Department were for an emergent problem and are not intended as complete care. It is important that you follow up with a doctor, nurse practitioner, or physician's assistant for ongoing care. If your symptoms worsen or you do not improve as expected and you are unable to reach your usual health care provider, you should return to the Emergency Department. We are available 24 hours a day. Take this sheet with you when you go to your follow-up visit. If you have any problem arranging the follow-up visit, contact the Emergency Department immediately. Make an appointment your family doctor for follow up of this visit. Return to the ER if you are unable to be seen in a timely manner.

## 2023-03-08 NOTE — ED PROVIDER NOTES
30-year-old female history of morbid obesity, sleep apnea for which she has a tracheostomy presents to the emergency department with chief complaint of chest pain. She had an episode of sharp chest pain which lasted a few minutes until she was able to void resolved. Denies history of cardiovascular disease but does see cardiology. She called her cardiologist who recommended coming to the emergency department for evaluation. She is symptom-free upon arrival.    The history is provided by medical records and the patient. Chest Pain (Angina)   This is a new problem. Past Medical History:   Diagnosis Date    Depression, major, single episode, severe ([address] 11/16/2017    Morbid obesity ([address] 4/18/2016    Racing heart beat     Routine Papanicolaou smear 02/13/2019    Negative ; HPV Negative     Sleep apnea     Tracheostomy dependence ([facility] [address]     for severe sleep apnea        Past Surgical History:   Procedure Laterality Date    HX GYN  2002    removal of benign ovarian cyst - per pt done in OR, under anesthesia, but states had no incisions    HX TRACHEOSTOMY  8/26/13    Due to sleep apnea         Family History:   Problem Relation Age of Onset    Diabetes Mother     Hypertension Mother     Heart Disease Maternal Grandmother     No Known Problems Father     Breast Cancer Maternal Aunt         Great Aunt - ?breast ?bone    Cancer Other         great aunt - not sure what type of cancer       Social History     Socioeconomic History    Marital status: SINGLE     Spouse name: Not on file    Number of children: Not on file    Years of education: Not on file    Highest education level: Not on file   Occupational History    Not on file   Tobacco Use    Smoking status: Former     Packs/day: 2.00     Years: 10.00     Pack years: 20.00     Types: Cigarettes     Quit date: 2013     Years since quitting: 10.1    Smokeless tobacco: Never   Vaping Use    Vaping Use: Never used   Substance and Sexual Activity    Alcohol use:  No Alcohol/week: 0.0 standard drinks    Drug use: No     Comment: Smoked Marijuana in 0329-8460, but not current user. -vr    Sexual activity: Not Currently   Other Topics Concern    Not on file   Social History Narrative    Not on file     Social Determinants of Health     Financial Resource Strain: Not on file   Food Insecurity: Not on file   Transportation Needs: Not on file   Physical Activity: Not on file   Stress: Not on file   Social Connections: Not on file   Intimate Partner Violence: Not on file   Housing Stability: Not on file         ALLERGIES: Sulfa (sulfonamide antibiotics)    Review of Systems   Cardiovascular:  Positive for chest pain. Vitals:    03/08/23 1040 03/08/23 1040 03/08/23 1042   BP:  (!) 140/98    Pulse:  92    Resp:  16    Temp:   98.4 °F (36.9 °C)   SpO2:  96%    Weight: (!) 193.2 kg (426 lb)     Height: 5' 7\" (1.702 m)              Physical Exam  Vitals and nursing note reviewed. Constitutional:       Appearance: Normal appearance. She is obese. Cardiovascular:      Rate and Rhythm: Normal rate. Pulses: Normal pulses. Pulmonary:      Effort: Pulmonary effort is normal. No respiratory distress. Neurological:      Mental Status: She is alert. Medical Decision Making  71-year-old female presents as above with an episode of chest pain which self resolved. Historically not consistent with ACS. Work-up in the emergency department is reassuring with with low risk for ACS, PTX, pneumonia, PE, dissection. Will discharge with instructions to follow-up with her primary care, return if needed. Amount and/or Complexity of Data Reviewed  External Data Reviewed: notes. Labs: ordered. Radiology: ordered and independent interpretation performed. Decision-making details documented in ED Course. ECG/medicine tests: ordered and independent interpretation performed. Decision-making details documented in ED Course.       ED Course as of 03/08/23 1143   Wed Mar 08, 2023   1047 ED EKG interpretation:  Rhythm: normal sinus rhythm. Rate (approx.): 87.  Axis: normal.  ST segment:  No concerning ST elevations or depressions. This EKG was interpreted by Naomy Ramirez MD,ED Provider. [JM]   9220 I have independently viewed the obtained radiographic images and note chest x-ray without acute findings. Limited by body habitus. Will await radiology read.  [JM]      ED Course User Index  [JM] Roosevelt Hare MD       Procedures

## 2023-03-08 NOTE — ED TRIAGE NOTES
Pt arrives to ER with c/o chest pain sharpness in the center of chest. Pt reports after she urinated this morning her chest pain went away but was told by her cardiologist to come in and be seen.

## 2023-03-09 LAB
ATRIAL RATE: 87 BPM
CALCULATED P AXIS, ECG09: 44 DEGREES
CALCULATED R AXIS, ECG10: -24 DEGREES
CALCULATED T AXIS, ECG11: 1 DEGREES
DIAGNOSIS, 93000: NORMAL
P-R INTERVAL, ECG05: 128 MS
Q-T INTERVAL, ECG07: 370 MS
QRS DURATION, ECG06: 94 MS
QTC CALCULATION (BEZET), ECG08: 445 MS
VENTRICULAR RATE, ECG03: 87 BPM

## 2023-03-13 DIAGNOSIS — I10 ESSENTIAL HYPERTENSION: ICD-10-CM

## 2023-03-13 RX ORDER — HYDROCHLOROTHIAZIDE 25 MG/1
25 TABLET ORAL DAILY
Qty: 90 TABLET | Refills: 1 | Status: CANCELLED | OUTPATIENT
Start: 2023-03-13

## 2023-03-13 NOTE — TELEPHONE ENCOUNTER
Pls call patient  Why is she going to run out of her medication early  Last sent 2 mo ago 90 d supply

## 2023-03-14 NOTE — TELEPHONE ENCOUNTER
HCTZ- Called pt, and left a voice message, asking that she call the office back in regards to her medication.

## 2023-03-16 RX ORDER — METFORMIN HYDROCHLORIDE 500 MG/1
1000 TABLET, EXTENDED RELEASE ORAL
Qty: 360 TABLET | Refills: 0 | Status: SHIPPED | OUTPATIENT
Start: 2023-03-16

## 2023-03-27 ENCOUNTER — TELEPHONE (OUTPATIENT)
Dept: FAMILY MEDICINE CLINIC | Age: 43
End: 2023-03-27

## 2023-03-27 NOTE — TELEPHONE ENCOUNTER
Contacted patient to schedule diabetes follow up with Marion Johnston MD. Left Message with patient to return call to writer at [phone] at their convenience.

## 2023-03-30 ENCOUNTER — TELEPHONE (OUTPATIENT)
Dept: FAMILY MEDICINE CLINIC | Age: 43
End: 2023-03-30

## 2023-03-30 NOTE — TELEPHONE ENCOUNTER
Oriana Wetzel MD  [facility] 2 hours ago (10:21 AM)     CD  This is OK for now   She may fu in our office or with her cardiologist if she has further concerns

## 2023-03-30 NOTE — TELEPHONE ENCOUNTER
Called pt, and left a voice message advising of recommendation. Advised to call office to schedule appt or contact cardiology.

## 2023-03-30 NOTE — TELEPHONE ENCOUNTER
Pt took her BP on both arms and the readings were off, she is asking if that is normal. Right 127/89  left 132/74    [facility]  [phone]

## 2023-04-22 DIAGNOSIS — H47.10 CHOKED DISC: Primary | ICD-10-CM

## 2023-05-17 LAB — HBA1C MFR BLD HPLC: 6.2 %

## 2023-05-30 RX ORDER — METFORMIN HYDROCHLORIDE 500 MG/1
TABLET, EXTENDED RELEASE ORAL
Qty: 360 TABLET | Refills: 1 | Status: SHIPPED | OUTPATIENT
Start: 2023-05-30

## 2023-05-30 RX ORDER — HYDROCHLOROTHIAZIDE 25 MG/1
TABLET ORAL
Qty: 90 TABLET | Refills: 1 | Status: SHIPPED | OUTPATIENT
Start: 2023-05-30

## 2023-06-10 ENCOUNTER — APPOINTMENT (OUTPATIENT)
Facility: HOSPITAL | Age: 43
End: 2023-06-10
Payer: MEDICARE

## 2023-06-10 ENCOUNTER — HOSPITAL ENCOUNTER (EMERGENCY)
Facility: HOSPITAL | Age: 43
Discharge: HOME | End: 2023-06-10
Attending: EMERGENCY MEDICINE
Payer: MEDICARE

## 2023-06-10 VITALS
WEIGHT: 293 LBS | DIASTOLIC BLOOD PRESSURE: 67 MMHG | RESPIRATION RATE: 18 BRPM | BODY MASS INDEX: 45.99 KG/M2 | HEIGHT: 67 IN | OXYGEN SATURATION: 97 % | TEMPERATURE: 98.3 F | HEART RATE: 90 BPM | SYSTOLIC BLOOD PRESSURE: 118 MMHG

## 2023-06-10 DIAGNOSIS — R00.2 PALPITATIONS: Primary | ICD-10-CM

## 2023-06-10 DIAGNOSIS — R07.9 CHEST PAIN, UNSPECIFIED TYPE: ICD-10-CM

## 2023-06-10 LAB
ALBUMIN SERPL-MCNC: 3.3 G/DL (ref 3.5–5)
ALBUMIN/GLOB SERPL: 0.8 (ref 1.1–2.2)
ALP SERPL-CCNC: 65 U/L (ref 45–117)
ALT SERPL-CCNC: 33 U/L (ref 12–78)
ANION GAP SERPL CALC-SCNC: 3 MMOL/L (ref 5–15)
AST SERPL-CCNC: 17 U/L (ref 15–37)
BASOPHILS # BLD: 0 K/UL (ref 0–0.1)
BASOPHILS NFR BLD: 0 % (ref 0–1)
BILIRUB SERPL-MCNC: 0.2 MG/DL (ref 0.2–1)
BUN SERPL-MCNC: 10 MG/DL (ref 6–20)
BUN/CREAT SERPL: 13 (ref 12–20)
CALCIUM SERPL-MCNC: 9 MG/DL (ref 8.5–10.1)
CHLORIDE SERPL-SCNC: 106 MMOL/L (ref 97–108)
CO2 SERPL-SCNC: 27 MMOL/L (ref 21–32)
COMMENT:: NORMAL
CREAT SERPL-MCNC: 0.79 MG/DL (ref 0.55–1.02)
DIFFERENTIAL METHOD BLD: ABNORMAL
EKG ATRIAL RATE: 91 BPM
EKG DIAGNOSIS: NORMAL
EKG P AXIS: 56 DEGREES
EKG P-R INTERVAL: 140 MS
EKG Q-T INTERVAL: 370 MS
EKG QRS DURATION: 92 MS
EKG QTC CALCULATION (BAZETT): 455 MS
EKG R AXIS: -25 DEGREES
EKG T AXIS: 31 DEGREES
EKG VENTRICULAR RATE: 91 BPM
EOSINOPHIL # BLD: 0.1 K/UL (ref 0–0.4)
EOSINOPHIL NFR BLD: 1 % (ref 0–7)
ERYTHROCYTE [DISTWIDTH] IN BLOOD BY AUTOMATED COUNT: 15.7 % (ref 11.5–14.5)
GLOBULIN SER CALC-MCNC: 4.4 G/DL (ref 2–4)
GLUCOSE SERPL-MCNC: 159 MG/DL (ref 65–100)
HCT VFR BLD AUTO: 31.7 % (ref 35–47)
HGB BLD-MCNC: 10.3 G/DL (ref 11.5–16)
IMM GRANULOCYTES # BLD AUTO: 0 K/UL (ref 0–0.04)
IMM GRANULOCYTES NFR BLD AUTO: 0 % (ref 0–0.5)
LYMPHOCYTES # BLD: 1.4 K/UL (ref 0.8–3.5)
LYMPHOCYTES NFR BLD: 16 % (ref 12–49)
MAGNESIUM SERPL-MCNC: 1.8 MG/DL (ref 1.6–2.4)
MCH RBC QN AUTO: 26.2 PG (ref 26–34)
MCHC RBC AUTO-ENTMCNC: 32.5 G/DL (ref 30–36.5)
MCV RBC AUTO: 80.7 FL (ref 80–99)
MONOCYTES # BLD: 0.6 K/UL (ref 0–1)
MONOCYTES NFR BLD: 7 % (ref 5–13)
NEUTS SEG # BLD: 6.6 K/UL (ref 1.8–8)
NEUTS SEG NFR BLD: 76 % (ref 32–75)
NRBC # BLD: 0 K/UL (ref 0–0.01)
NRBC BLD-RTO: 0 PER 100 WBC
PLATELET # BLD AUTO: 427 K/UL (ref 150–400)
PMV BLD AUTO: 8.6 FL (ref 8.9–12.9)
POTASSIUM SERPL-SCNC: 3.6 MMOL/L (ref 3.5–5.1)
PROT SERPL-MCNC: 7.7 G/DL (ref 6.4–8.2)
RBC # BLD AUTO: 3.93 M/UL (ref 3.8–5.2)
SODIUM SERPL-SCNC: 136 MMOL/L (ref 136–145)
SPECIMEN HOLD: NORMAL
TROPONIN I SERPL HS-MCNC: 6 NG/L (ref 0–51)
TROPONIN I SERPL HS-MCNC: 6 NG/L (ref 0–51)
WBC # BLD AUTO: 8.7 K/UL (ref 3.6–11)

## 2023-06-10 PROCEDURE — 83735 ASSAY OF MAGNESIUM: CPT

## 2023-06-10 PROCEDURE — 80053 COMPREHEN METABOLIC PANEL: CPT

## 2023-06-10 PROCEDURE — 93010 ELECTROCARDIOGRAM REPORT: CPT | Performed by: SPECIALIST

## 2023-06-10 PROCEDURE — 93005 ELECTROCARDIOGRAM TRACING: CPT | Performed by: EMERGENCY MEDICINE

## 2023-06-10 PROCEDURE — 84484 ASSAY OF TROPONIN QUANT: CPT

## 2023-06-10 PROCEDURE — 85025 COMPLETE CBC W/AUTO DIFF WBC: CPT

## 2023-06-10 PROCEDURE — 71045 X-RAY EXAM CHEST 1 VIEW: CPT

## 2023-06-10 PROCEDURE — 36415 COLL VENOUS BLD VENIPUNCTURE: CPT

## 2023-06-10 ASSESSMENT — PAIN - FUNCTIONAL ASSESSMENT: PAIN_FUNCTIONAL_ASSESSMENT: NONE - DENIES PAIN

## 2023-06-10 NOTE — ED NOTES
Pt discharge documentations provided. Pt has no questions or concerns at this time. Pt IV line removed, cath tip intact. Pt wheelchaired out of here by tech.  Pt breathing spontaneously, bilaterally symmetrical.       Logan Frias RN  06/10/23 6933

## 2023-06-10 NOTE — ED TRIAGE NOTES
Pt states she had a 10 to 15 minute episode of palpitations. Her pulse ox was reading around 90 to 92. Pt denies any chest pain, SOB, or palpations.

## 2023-06-10 NOTE — DISCHARGE INSTRUCTIONS
Return with any new or worsening symptoms. In the meantime please follow-up with Dr. Kentrell Najera.

## 2023-06-10 NOTE — ED PROVIDER NOTES
condition    CONSULTS:  None    PROCEDURES:     Procedures            (Please note that portions of this note were completed with a voice recognition program.  Efforts were made to edit the dictations but occasionally words are mis-transcribed.)    Matt Eduardo MD (electronically signed)  Emergency Attending Physician              Matt Eduardo MD  06/10/23 1921

## 2023-06-20 DIAGNOSIS — M25.472 EFFUSION, LEFT ANKLE: ICD-10-CM

## 2023-06-20 RX ORDER — FUROSEMIDE 20 MG/1
TABLET ORAL
Qty: 90 TABLET | Refills: 1 | Status: SHIPPED | OUTPATIENT
Start: 2023-06-20

## 2023-07-08 ENCOUNTER — APPOINTMENT (OUTPATIENT)
Facility: HOSPITAL | Age: 43
End: 2023-07-08
Payer: MEDICARE

## 2023-07-08 ENCOUNTER — HOSPITAL ENCOUNTER (EMERGENCY)
Facility: HOSPITAL | Age: 43
Discharge: HOME | End: 2023-07-08
Attending: STUDENT IN AN ORGANIZED HEALTH CARE EDUCATION/TRAINING PROGRAM | Admitting: STUDENT IN AN ORGANIZED HEALTH CARE EDUCATION/TRAINING PROGRAM
Payer: MEDICARE

## 2023-07-08 VITALS
RESPIRATION RATE: 16 BRPM | DIASTOLIC BLOOD PRESSURE: 80 MMHG | WEIGHT: 293 LBS | OXYGEN SATURATION: 95 % | SYSTOLIC BLOOD PRESSURE: 131 MMHG | HEART RATE: 79 BPM | BODY MASS INDEX: 45.99 KG/M2 | TEMPERATURE: 98.8 F | HEIGHT: 67 IN

## 2023-07-08 DIAGNOSIS — R07.89 ATYPICAL CHEST PAIN: Primary | ICD-10-CM

## 2023-07-08 LAB
ANION GAP SERPL CALC-SCNC: 13 MMOL/L (ref 5–15)
BUN SERPL-MCNC: 11 MG/DL (ref 6–20)
BUN/CREAT SERPL: 12 (ref 12–20)
CALCIUM SERPL-MCNC: 10.1 MG/DL (ref 8.6–10)
CHLORIDE SERPL-SCNC: 97 MMOL/L (ref 98–107)
CO2 SERPL-SCNC: 26 MMOL/L (ref 22–29)
COMMENT:: NORMAL
CREAT SERPL-MCNC: 0.95 MG/DL (ref 0.5–0.9)
ERYTHROCYTE [DISTWIDTH] IN BLOOD BY AUTOMATED COUNT: 15.4 % (ref 11.5–14.5)
GLUCOSE SERPL-MCNC: 118 MG/DL (ref 65–100)
HCT VFR BLD AUTO: 34.1 % (ref 35–47)
HGB BLD-MCNC: 10.9 G/DL (ref 11.5–16)
MCH RBC QN AUTO: 26 PG (ref 26–34)
MCHC RBC AUTO-ENTMCNC: 32 G/DL (ref 30–36.5)
MCV RBC AUTO: 81.4 FL (ref 80–99)
NRBC # BLD: 0 K/UL (ref 0–0.01)
NRBC BLD-RTO: 0 PER 100 WBC
NT PRO BNP: <36 PG/ML
PLATELET # BLD AUTO: 502 K/UL (ref 150–400)
PMV BLD AUTO: 9 FL (ref 8.9–12.9)
POTASSIUM SERPL-SCNC: 3.6 MMOL/L (ref 3.5–5.1)
RBC # BLD AUTO: 4.19 M/UL (ref 3.8–5.2)
SODIUM SERPL-SCNC: 136 MMOL/L (ref 136–145)
SPECIMEN HOLD: NORMAL
TROPONIN I BLD-MCNC: <0.04 NG/ML (ref 0–0.08)
WBC # BLD AUTO: 8.4 K/UL (ref 3.6–11)

## 2023-07-08 PROCEDURE — 99285 EMERGENCY DEPT VISIT HI MDM: CPT

## 2023-07-08 PROCEDURE — 71046 X-RAY EXAM CHEST 2 VIEWS: CPT

## 2023-07-08 PROCEDURE — 93005 ELECTROCARDIOGRAM TRACING: CPT | Performed by: STUDENT IN AN ORGANIZED HEALTH CARE EDUCATION/TRAINING PROGRAM

## 2023-07-08 PROCEDURE — 85027 COMPLETE CBC AUTOMATED: CPT

## 2023-07-08 PROCEDURE — 84484 ASSAY OF TROPONIN QUANT: CPT

## 2023-07-08 PROCEDURE — 83880 ASSAY OF NATRIURETIC PEPTIDE: CPT

## 2023-07-08 PROCEDURE — 80048 BASIC METABOLIC PNL TOTAL CA: CPT

## 2023-07-08 RX ORDER — METOPROLOL SUCCINATE 50 MG/1
50 TABLET, EXTENDED RELEASE ORAL DAILY
Status: DISCONTINUED | OUTPATIENT
Start: 2023-07-08 | End: 2023-07-08 | Stop reason: HOSPADM

## 2023-07-08 RX ORDER — METOPROLOL SUCCINATE 50 MG/1
50 TABLET, EXTENDED RELEASE ORAL DAILY
Qty: 60 TABLET | Refills: 0 | Status: SHIPPED | OUTPATIENT
Start: 2023-07-08 | End: 2023-09-06

## 2023-07-08 ASSESSMENT — LIFESTYLE VARIABLES
HOW OFTEN DO YOU HAVE A DRINK CONTAINING ALCOHOL: NEVER
HOW MANY STANDARD DRINKS CONTAINING ALCOHOL DO YOU HAVE ON A TYPICAL DAY: PATIENT DOES NOT DRINK

## 2023-07-08 ASSESSMENT — ENCOUNTER SYMPTOMS: SHORTNESS OF BREATH: 0

## 2023-07-08 ASSESSMENT — PAIN SCALES - GENERAL: PAINLEVEL_OUTOF10: 0

## 2023-07-08 NOTE — ED NOTES
[facility] EMERGENCY DEPT  EMERGENCY DEPARTMENT ENCOUNTER      Pt Name: Amber Hyatt  MRN: [id number]  [address] 1980  Date of evaluation: 7/8/2023  Provider: GALE Galindo NP    CHIEF COMPLAINT       Chief Complaint   Patient presents with    Chest Pain    Dizziness         HISTORY OF PRESENT ILLNESS   (Location/Symptom, Timing/Onset, Context/Setting, Quality, Duration, Modifying Factors, Severity)  Note limiting factors. 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 Patient states that she has been experiencing intermittent bouts of chest pain for quite some time. She has been to the hospital numerous times and has had normal ED work-ups. She states that she started to experience the similar symptoms since Monday of this week, but they seem to be occurring more frequently than they previously did. She describes the discomfort as a sensation of pulling in her chest.  She states that the symptoms are often times associated with heightened times of stress and anxiety; sometimes they just come and go without rhyme or reason. They are not exertional induced. She also reports an intermittent feeling of heart racing which she attributes to anxiety. She has an appointment to see her cardiologist, Dr. Zayda Ferrara on Monday. She is compliant with all of her medications. ROS is negative for: Dyspnea, orthopnea, lower extremity edema, abdominal distention, cough, fevers, presyncope/syncope. Of note, patient noticed to have exophthalmos but states that she has had \"bulging eyes\" since birth and recently had her thyroid checked and it was completely normal.      Review of External Medical Records:     Nursing Notes were reviewed.     REVIEW OF

## 2023-07-08 NOTE — ED TRIAGE NOTES
Patient with hx of HTN, DM, JOE with capped trach arrives c/o of chest tightness x 1day with episode of lightheadedness this morning while in the shower. Reports additional episodes of lightheadedness while at rest at home this week. Reporting increased SOB, noted to have edematous feet but patient denies it being greater than normal.     Reports Cardiologist through [facility]. Pulmonologist through  [facility].

## 2023-07-08 NOTE — ED NOTES
Patient denies presently experiencing chest pain or dizziness.       Charlie Ramos RN  07/08/23 0054

## 2023-07-09 LAB
EKG ATRIAL RATE: 90 BPM
EKG DIAGNOSIS: NORMAL
EKG P AXIS: 52 DEGREES
EKG P-R INTERVAL: 128 MS
EKG Q-T INTERVAL: 366 MS
EKG QRS DURATION: 102 MS
EKG QTC CALCULATION (BAZETT): 447 MS
EKG R AXIS: -28 DEGREES
EKG T AXIS: 44 DEGREES
EKG VENTRICULAR RATE: 90 BPM

## 2023-07-26 RX ORDER — BLOOD SUGAR DIAGNOSTIC
STRIP MISCELLANEOUS
Qty: 100 STRIP | Refills: 1 | Status: SHIPPED | OUTPATIENT
Start: 2023-07-26

## 2023-07-26 RX ORDER — LANCETS 33 GAUGE
EACH MISCELLANEOUS
Qty: 100 EACH | Refills: 9 | Status: SHIPPED | OUTPATIENT
Start: 2023-07-26

## 2023-07-27 DIAGNOSIS — L71.0 PERIORAL DERMATITIS: ICD-10-CM

## 2023-07-27 DIAGNOSIS — J01.90 ACUTE SINUSITIS, UNSPECIFIED: ICD-10-CM

## 2023-07-27 DIAGNOSIS — B37.9 CANDIDIASIS, UNSPECIFIED: ICD-10-CM

## 2023-07-27 DIAGNOSIS — L24.9 IRRITANT CONTACT DERMATITIS, UNSPECIFIED CAUSE: ICD-10-CM

## 2023-07-28 RX ORDER — DOXYCYCLINE 100 MG/1
TABLET ORAL
Qty: 20 TABLET | OUTPATIENT
Start: 2023-07-28

## 2023-08-08 ENCOUNTER — TELEPHONE (OUTPATIENT)
Facility: CLINIC | Age: 43
End: 2023-08-08

## 2023-08-08 NOTE — TELEPHONE ENCOUNTER
----- Message from Rona Keys sent at 7/31/2023  2:16 PM EDT -----  Subject: Message to Provider    QUESTIONS  Information for Provider? Sharee Brock/ Julio Cesar is calling and says the   patient has been seen in the ED frequently. He needed to notify the DrMary   and see if she is coming in after the ED visits. Total of 9 visits. Most   recent he can see is 7/8. Wants to know if she is coming in to see the Dr.   Please call to advise at [phone].  ---------------------------------------------------------------------------  --------------  Karl Madison Max  [phone]; OK to leave message on voicemail  ---------------------------------------------------------------------------  --------------  SCRIPT ANSWERS  Relationship to Patient? Covered Entity  Covered Entity Type? Health Insurance? Representative Name?  Chapo Brock/Julio Cesar

## 2023-10-25 ENCOUNTER — HOSPITAL ENCOUNTER (EMERGENCY)
Facility: HOSPITAL | Age: 43
Discharge: HOME | End: 2023-10-25
Attending: EMERGENCY MEDICINE
Payer: MEDICARE

## 2023-10-25 VITALS
OXYGEN SATURATION: 97 % | HEART RATE: 79 BPM | DIASTOLIC BLOOD PRESSURE: 83 MMHG | SYSTOLIC BLOOD PRESSURE: 126 MMHG | TEMPERATURE: 99.3 F | RESPIRATION RATE: 17 BRPM

## 2023-10-25 DIAGNOSIS — H60.391 INFECTIVE OTITIS EXTERNA OF RIGHT EAR: Primary | ICD-10-CM

## 2023-10-25 PROCEDURE — 99283 EMERGENCY DEPT VISIT LOW MDM: CPT

## 2023-10-25 RX ORDER — CIPROFLOXACIN AND DEXAMETHASONE 3; 1 MG/ML; MG/ML
4 SUSPENSION/ DROPS AURICULAR (OTIC) 2 TIMES DAILY
Qty: 7.5 ML | Refills: 0 | Status: SHIPPED | OUTPATIENT
Start: 2023-10-25 | End: 2023-11-01

## 2023-10-25 ASSESSMENT — ENCOUNTER SYMPTOMS
EYE PAIN: 0
VOMITING: 0
SORE THROAT: 0
COUGH: 0
DIARRHEA: 0
SHORTNESS OF BREATH: 0
NAUSEA: 0
ABDOMINAL PAIN: 0

## 2023-10-25 ASSESSMENT — PAIN - FUNCTIONAL ASSESSMENT: PAIN_FUNCTIONAL_ASSESSMENT: 0-10

## 2023-10-25 ASSESSMENT — PAIN SCALES - GENERAL: PAINLEVEL_OUTOF10: 0

## 2023-10-25 NOTE — ED PROVIDER NOTES
[facility] EMERGENCY DEPT  EMERGENCY DEPARTMENT ENCOUNTER      Pt Name: Otilio Sykes  MRN: [id number]  [address] 1980  Date of evaluation: 10/25/2023  Provider: Cherelle Cormier       Chief Complaint   Patient presents with    Otalgia     Right Ear         HISTORY OF PRESENT ILLNESS   (Location/Symptom, Timing/Onset, Context/Setting, Quality, Duration, Modifying Factors, Severity)  Note limiting factors. 81-year-old female presents to ED with right otorrhea and otalgia. Patient reports that yesterday she started noticing some pain in her right ear. She reports this morning when she woke up she also noticed yellowish discharge coming from her right ear as well. She notes slight muffled hearing. Otherwise denies any fevers, chills, nausea, vomiting or diarrhea. No history of similar symptoms. Review of External Medical Records:     Nursing Notes were reviewed. REVIEW OF SYSTEMS    (2-9 systems for level 4, 10 or more for level 5)     Review of Systems   Constitutional:  Negative for chills and fever. HENT:  Positive for ear pain. Negative for congestion and sore throat. Eyes:  Negative for pain. Respiratory:  Negative for cough and shortness of breath. Cardiovascular:  Negative for chest pain. Gastrointestinal:  Negative for abdominal pain, diarrhea, nausea and vomiting. Genitourinary:  Negative for dysuria and flank pain. Musculoskeletal:  Negative for myalgias. Skin:  Negative for rash. Neurological:  Negative for dizziness and headaches. Hematological:  Negative for adenopathy. Except as noted above the remainder of the review of systems was reviewed and negative.        PAST MEDICAL HISTORY     Past Medical History:   Diagnosis Date    Depression, major, single episode, severe ([address]) 11/16/2017    Morbid obesity ([address]) 4/18/2016    Racing heart beat     Routine Papanicolaou smear 02/13/2019    Negative ; HPV Negative     Sleep apnea

## 2023-10-25 NOTE — ED TRIAGE NOTES
Pt comes in ambulatory in no signs of distress. Pt has a tracheostomy. Pt reports two days ago fullness in her right ear. Pt reports \"swabbed t out and there was a yellowish discharge. \" PT denies fevers/cough.

## 2023-10-25 NOTE — ED NOTES
Pt was discharged and given instructions by MD. Pt verbalized good understanding of all discharge instructions,1 prescription and F/U care. All questions answered. Pt in stable condition on discharge.          Moncho Morse RN  10/25/23 3291

## 2023-10-30 RX ORDER — HYDROCHLOROTHIAZIDE 25 MG/1
TABLET ORAL
Qty: 90 TABLET | Refills: 1 | OUTPATIENT
Start: 2023-10-30

## 2023-10-30 NOTE — TELEPHONE ENCOUNTER
appt in person required, last in office visit >1 year ago, [facility] for November appt, no pending appts  Please notify patient and offer to schedule visit

## 2023-10-30 NOTE — TELEPHONE ENCOUNTER
Pt is stating she just started training with a new job today and will be for 6 weeks. After that she will be off on Wednesdays and Sundays which is a virtual day. She missed her last appointment because of the [employer] / [employer] debate. She is asking that Dr Tanner Polanco please fill her medication until she is able to come in. She will do a virtual if that is acceptable.

## 2023-10-31 RX ORDER — HYDROCHLOROTHIAZIDE 25 MG/1
25 TABLET ORAL DAILY
Qty: 90 TABLET | Refills: 1 | OUTPATIENT
Start: 2023-10-31

## 2023-11-08 ENCOUNTER — HOSPITAL ENCOUNTER (EMERGENCY)
Facility: HOSPITAL | Age: 43
Discharge: HOME | End: 2023-11-08
Attending: EMERGENCY MEDICINE
Payer: MEDICARE

## 2023-11-08 ENCOUNTER — APPOINTMENT (OUTPATIENT)
Facility: HOSPITAL | Age: 43
End: 2023-11-08
Payer: MEDICARE

## 2023-11-08 VITALS
SYSTOLIC BLOOD PRESSURE: 139 MMHG | TEMPERATURE: 99.4 F | BODY MASS INDEX: 45.99 KG/M2 | HEART RATE: 81 BPM | RESPIRATION RATE: 16 BRPM | WEIGHT: 293 LBS | HEIGHT: 67 IN | DIASTOLIC BLOOD PRESSURE: 87 MMHG | OXYGEN SATURATION: 97 %

## 2023-11-08 DIAGNOSIS — H92.03 OTALGIA OF BOTH EARS: ICD-10-CM

## 2023-11-08 DIAGNOSIS — R09.81 NASAL CONGESTION: Primary | ICD-10-CM

## 2023-11-08 DIAGNOSIS — Z76.0 ENCOUNTER FOR MEDICATION REFILL: ICD-10-CM

## 2023-11-08 LAB
FLUAV AG NPH QL IA: NEGATIVE
FLUBV AG NOSE QL IA: NEGATIVE
SARS-COV-2 RDRP RESP QL NAA+PROBE: NOT DETECTED
SOURCE: NORMAL

## 2023-11-08 PROCEDURE — 87804 INFLUENZA ASSAY W/OPTIC: CPT

## 2023-11-08 PROCEDURE — 71046 X-RAY EXAM CHEST 2 VIEWS: CPT

## 2023-11-08 PROCEDURE — 87635 SARS-COV-2 COVID-19 AMP PRB: CPT

## 2023-11-08 PROCEDURE — 99284 EMERGENCY DEPT VISIT MOD MDM: CPT

## 2023-11-08 RX ORDER — HYDROCHLOROTHIAZIDE 25 MG/1
25 TABLET ORAL DAILY
Qty: 30 TABLET | Refills: 0 | Status: SHIPPED | OUTPATIENT
Start: 2023-11-08 | End: 2023-12-08

## 2023-11-08 ASSESSMENT — ENCOUNTER SYMPTOMS
PHOTOPHOBIA: 1
RHINORRHEA: 1
SHORTNESS OF BREATH: 0
NAUSEA: 1
ABDOMINAL PAIN: 0
SORE THROAT: 0
VOMITING: 0
COUGH: 0

## 2023-11-08 ASSESSMENT — PAIN SCALES - GENERAL: PAINLEVEL_OUTOF10: 0

## 2023-11-08 ASSESSMENT — PAIN - FUNCTIONAL ASSESSMENT: PAIN_FUNCTIONAL_ASSESSMENT: 0-10

## 2023-11-08 NOTE — ED TRIAGE NOTES
Pt arrives with c/o chest congestion, sinus pressure, right ear pian. Pt was tx here for ear infection but feel it hasn't gotten better. Denies fever, cough.

## 2023-11-09 NOTE — ED NOTES
Pt given discharge instructions, patient education, prescriptions, and follow up information. Pt verbalizes understanding. All questions answered. Patient discharged to home in private vehicle, ambulatory. Pt A&Ox4, RA, pain controlled.       Varghese Minaya RN  11/08/23 8876

## 2023-11-09 NOTE — ED PROVIDER NOTES
ordered. Risk  Prescription drug management. Patient is a 81-year-old female presenting to the emergency room complaining of bilateral ear pain, rhinorrhea, nasal congestion, and chest congestion for the past 2 to 3 days. Doubt COVID or flu given nasal swabs were negative. Doubt acute respiratory distress given patient appears to be comfortable without the use of accessory muscles. Doubt pneumonia given no adventitious lung sounds were heard during auscultation and chest x-ray showed \"no acute cardiopulmonary disease\". Doubt otitis media given tympanic membrane given assessment but did note fluid behind ears. Pt is encouraged to take an allergy medication and flonase to help with nasal congestion. Pt is also encouraged to continue ibuprofen and tylenol as needed for pains and to follow-up with her PCP in 3-4 days for reevaluation. Pt also request for med refill on her HCTZ. Medication was sent to pharmacy. Strict return to ED precautions given. ACI discussed with the patient; see instruction below. Patient verbalized understanding. CONSULTS:  None    PROCEDURES:  Unless otherwise noted below, none     Procedures      FINAL IMPRESSION      1. Nasal congestion    2. Otalgia of both ears    3.  Encounter for medication refill          DISPOSITION/PLAN   DISPOSITION Decision To Discharge 11/08/2023 09:04:42 PM      PATIENT REFERRED TO:  Jaja Lopez MD  [address]  [phone]    Schedule an appointment as soon as possible for a visit in 3 days        DISCHARGE MEDICATIONS:  Discharge Medication List as of 11/8/2023  9:04 PM            (Please note that portions of this note were completed with a voice recognition program.  Efforts were made to edit the dictations but occasionally words are mis-transcribed.)    GALE Driscoll NP (electronically signed)  Emergency Attending Physician / Physician Assistant / Nurse Practitioner             Quentin Bianchi,

## 2023-11-09 NOTE — DISCHARGE INSTRUCTIONS
Start using zyrtec/ allegra/ Claritin once a day with Flonase nasal spray to help with the nasal congestion. Take ibuprofen and tylenol as needed for pains. Follow-up with your primary care provider in 3-4 days for reevaluation. Call for appointment as soon as possible.

## 2023-11-11 ENCOUNTER — HOSPITAL ENCOUNTER (EMERGENCY)
Facility: HOSPITAL | Age: 43
Discharge: HOME | End: 2023-11-11
Attending: STUDENT IN AN ORGANIZED HEALTH CARE EDUCATION/TRAINING PROGRAM
Payer: MEDICARE

## 2023-11-11 VITALS
HEIGHT: 67 IN | RESPIRATION RATE: 20 BRPM | SYSTOLIC BLOOD PRESSURE: 130 MMHG | BODY MASS INDEX: 45.99 KG/M2 | WEIGHT: 293 LBS | OXYGEN SATURATION: 95 % | DIASTOLIC BLOOD PRESSURE: 79 MMHG | HEART RATE: 77 BPM | TEMPERATURE: 99.6 F

## 2023-11-11 DIAGNOSIS — S93.401A SPRAIN OF RIGHT ANKLE, UNSPECIFIED LIGAMENT, INITIAL ENCOUNTER: ICD-10-CM

## 2023-11-11 DIAGNOSIS — H60.501 ACUTE OTITIS EXTERNA OF RIGHT EAR, UNSPECIFIED TYPE: Primary | ICD-10-CM

## 2023-11-11 PROCEDURE — 87076 CULTURE ANAEROBE IDENT EACH: CPT

## 2023-11-11 PROCEDURE — 99283 EMERGENCY DEPT VISIT LOW MDM: CPT

## 2023-11-11 PROCEDURE — 87077 CULTURE AEROBIC IDENTIFY: CPT

## 2023-11-11 PROCEDURE — 87186 SC STD MICRODIL/AGAR DIL: CPT

## 2023-11-11 PROCEDURE — 87070 CULTURE OTHR SPECIMN AEROBIC: CPT

## 2023-11-11 RX ORDER — AMOXICILLIN AND CLAVULANATE POTASSIUM 875; 125 MG/1; MG/1
1 TABLET, FILM COATED ORAL 2 TIMES DAILY
Qty: 14 TABLET | Refills: 0 | Status: SHIPPED | OUTPATIENT
Start: 2023-11-11 | End: 2023-11-18

## 2023-11-11 ASSESSMENT — PAIN - FUNCTIONAL ASSESSMENT: PAIN_FUNCTIONAL_ASSESSMENT: 0-10

## 2023-11-11 ASSESSMENT — PAIN SCALES - GENERAL: PAINLEVEL_OUTOF10: 2

## 2023-11-11 NOTE — ED TRIAGE NOTES
Pt presents ambulatory into ed a&ox3, no acute distress, breaths even and unlabored c/o R ear drainage of pus, denies pain, just reports it feels full. Pt reports has had 2 ear infections in the past 3 weeks. Pt reports was on ear drops, no oral abx. Pt also reports she was walking in to ER when she twisted L ankle, reporting minor pain.

## 2023-11-11 NOTE — DISCHARGE INSTRUCTIONS
We are sending you home with a prescription for an antibiotic, Augmentin, please take as prescribed. Please continue your eardrops as well. If symptoms do not begin to improve over the coming days, please call [address] and Throat for further evaluation. If symptoms worsen or new concerning symptoms arise, please report to the nearest emergency department. Thank you for allowing us to provide you with medical care today. We realize that you have many choices for your emergency care needs. We thank you for choosing [facility]. Please choose us in the future for any continued health care needs. The exam and treatment you received in the Emergency Department were for an emergent problem and are not intended as complete care. It is important that you follow up with a doctor, nurse practitioner, or physician's assistant for ongoing care. If your symptoms worsen or you do not improve as expected and you are unable to reach your usual health care provider, you should return to the Emergency Department. We are available 24 hours a day. Please make an appointment with your health care provider(s) for follow up of your Emergency Department visit. Take this sheet with you when you go to your follow-up visit.

## 2023-11-11 NOTE — ED NOTES
Discharge information reviewed. All questions answered. Patient stable and ambulatory upon discharge.      Luciano Zaman RN  11/11/23 1694

## 2023-11-12 LAB
BACTERIA SPEC CULT: NORMAL
SERVICE CMNT-IMP: NORMAL

## 2023-11-12 ASSESSMENT — ENCOUNTER SYMPTOMS
SHORTNESS OF BREATH: 0
COLOR CHANGE: 0

## 2023-11-14 LAB
BACTERIA SPEC CULT: ABNORMAL
SERVICE CMNT-IMP: ABNORMAL

## 2023-11-26 ENCOUNTER — APPOINTMENT (OUTPATIENT)
Facility: HOSPITAL | Age: 43
End: 2023-11-26
Payer: MEDICARE

## 2023-11-26 ENCOUNTER — HOSPITAL ENCOUNTER (EMERGENCY)
Facility: HOSPITAL | Age: 43
Discharge: HOME | End: 2023-11-27
Attending: EMERGENCY MEDICINE
Payer: MEDICARE

## 2023-11-26 DIAGNOSIS — R00.2 PALPITATIONS: Primary | ICD-10-CM

## 2023-11-26 LAB
ALBUMIN SERPL-MCNC: 3.9 G/DL (ref 3.5–5.2)
ALBUMIN/GLOB SERPL: 1 (ref 1.1–2.2)
ALP SERPL-CCNC: 70 U/L (ref 35–104)
ALT SERPL-CCNC: 19 U/L (ref 10–35)
ANION GAP SERPL CALC-SCNC: 9 MMOL/L (ref 5–15)
AST SERPL-CCNC: 18 U/L (ref 10–35)
BASOPHILS # BLD: 0 K/UL (ref 0–1)
BASOPHILS NFR BLD: 1 % (ref 0–1)
BILIRUB SERPL-MCNC: <0.2 MG/DL (ref 0.2–1)
BUN SERPL-MCNC: 10 MG/DL (ref 6–20)
BUN/CREAT SERPL: 16 (ref 12–20)
CALCIUM SERPL-MCNC: 8.9 MG/DL (ref 8.6–10)
CHLORIDE SERPL-SCNC: 104 MMOL/L (ref 98–107)
CO2 SERPL-SCNC: 26 MMOL/L (ref 22–29)
CREAT SERPL-MCNC: 0.62 MG/DL (ref 0.5–0.9)
DIFFERENTIAL METHOD BLD: ABNORMAL
EOSINOPHIL # BLD: 0.1 K/UL (ref 0–0.4)
EOSINOPHIL NFR BLD: 1 %
ERYTHROCYTE [DISTWIDTH] IN BLOOD BY AUTOMATED COUNT: 15.9 % (ref 11.5–14.5)
GLOBULIN SER CALC-MCNC: 3.8 G/DL (ref 2–4)
GLUCOSE SERPL-MCNC: 130 MG/DL (ref 65–100)
HCT VFR BLD AUTO: 31.4 % (ref 35–47)
HGB BLD-MCNC: 9.8 G/DL (ref 11.5–16)
IMM GRANULOCYTES # BLD AUTO: 0 K/UL (ref 0–0.04)
IMM GRANULOCYTES NFR BLD AUTO: 0 % (ref 0–0.5)
LYMPHOCYTES # BLD: 1.7 K/UL (ref 0.8–3.5)
LYMPHOCYTES NFR BLD: 20 % (ref 12–49)
MCH RBC QN AUTO: 25.3 PG (ref 26–34)
MCHC RBC AUTO-ENTMCNC: 31.2 G/DL (ref 30–36.5)
MCV RBC AUTO: 80.9 FL (ref 80–99)
MONOCYTES # BLD: 0.7 K/UL (ref 0–1)
MONOCYTES NFR BLD: 9 % (ref 5–13)
NEUTS SEG # BLD: 5.7 K/UL (ref 1.8–8)
NEUTS SEG NFR BLD: 69 % (ref 32–75)
NRBC # BLD: 0 K/UL (ref 0–0.01)
NRBC BLD-RTO: 0 PER 100 WBC
PLATELET # BLD AUTO: 496 K/UL (ref 150–400)
PMV BLD AUTO: 8.5 FL (ref 8.9–12.9)
POTASSIUM SERPL-SCNC: 3.5 MMOL/L (ref 3.5–5.1)
PROT SERPL-MCNC: 7.7 G/DL (ref 6.4–8.3)
RBC # BLD AUTO: 3.88 M/UL (ref 3.8–5.2)
SODIUM SERPL-SCNC: 139 MMOL/L (ref 136–145)
TROPONIN I BLD-MCNC: <0.04 NG/ML (ref 0–0.08)
WBC # BLD AUTO: 8.3 K/UL (ref 3.6–11)

## 2023-11-26 PROCEDURE — 93005 ELECTROCARDIOGRAM TRACING: CPT | Performed by: EMERGENCY MEDICINE

## 2023-11-26 PROCEDURE — 85025 COMPLETE CBC W/AUTO DIFF WBC: CPT

## 2023-11-26 PROCEDURE — 36415 COLL VENOUS BLD VENIPUNCTURE: CPT

## 2023-11-26 PROCEDURE — 84484 ASSAY OF TROPONIN QUANT: CPT

## 2023-11-26 PROCEDURE — 71046 X-RAY EXAM CHEST 2 VIEWS: CPT

## 2023-11-26 PROCEDURE — 80053 COMPREHEN METABOLIC PANEL: CPT

## 2023-11-26 PROCEDURE — 99285 EMERGENCY DEPT VISIT HI MDM: CPT

## 2023-11-26 ASSESSMENT — ENCOUNTER SYMPTOMS
DIARRHEA: 0
BACK PAIN: 0
NAUSEA: 0
VOMITING: 0
CONSTIPATION: 0
COLOR CHANGE: 0
SHORTNESS OF BREATH: 0
ABDOMINAL PAIN: 0

## 2023-11-26 ASSESSMENT — PAIN - FUNCTIONAL ASSESSMENT: PAIN_FUNCTIONAL_ASSESSMENT: NONE - DENIES PAIN

## 2023-11-27 VITALS
HEIGHT: 67 IN | OXYGEN SATURATION: 98 % | HEART RATE: 91 BPM | BODY MASS INDEX: 45.99 KG/M2 | WEIGHT: 293 LBS | DIASTOLIC BLOOD PRESSURE: 75 MMHG | RESPIRATION RATE: 20 BRPM | TEMPERATURE: 99.2 F | SYSTOLIC BLOOD PRESSURE: 124 MMHG

## 2023-11-27 LAB
EKG ATRIAL RATE: 85 BPM
EKG DIAGNOSIS: NORMAL
EKG P AXIS: 42 DEGREES
EKG P-R INTERVAL: 160 MS
EKG Q-T INTERVAL: 378 MS
EKG QRS DURATION: 96 MS
EKG QTC CALCULATION (BAZETT): 449 MS
EKG R AXIS: -26 DEGREES
EKG T AXIS: 3 DEGREES
EKG VENTRICULAR RATE: 85 BPM

## 2023-11-27 PROCEDURE — 93010 ELECTROCARDIOGRAM REPORT: CPT | Performed by: STUDENT IN AN ORGANIZED HEALTH CARE EDUCATION/TRAINING PROGRAM

## 2023-11-27 NOTE — ED TRIAGE NOTES
Pt ambulatory into ED with cc of low SpO2 and increased HR. Pt reports was watching tv  and had neck and jaw pain and a headache, that has since resolved. Reports checking Spo2 dt symptoms and was 92% and . Pt reports feeling anxious when she saw 92%. Pt is satting 97% at this time with trach and cap in place. NAD.

## 2023-11-27 NOTE — ED NOTES
Acuity increased to an EMMANUELLE of 3 due to c/o and altered airway     Maria M Lacey, LILLIE  11/26/23 7076

## 2023-11-27 NOTE — ED NOTES
Patient resting with eyes closed; respirations even and unlabored at this time. VS updated.      Dell Flowers RN  11/26/23 1846

## 2023-11-27 NOTE — ED NOTES
Pt given discharge instructions, patient education, 0 prescriptions and follow up information. Pt verbalizes understanding. All questions answered. Pt discharged to home in private vehicle, ambulatory. Pt A&Ox4, RA, pain controlled.        Madi Ziegler LPN  09/48/82 6365

## 2024-01-26 ENCOUNTER — TRANSCRIBE ORDERS (OUTPATIENT)
Facility: HOSPITAL | Age: 44
End: 2024-01-26

## 2024-01-26 DIAGNOSIS — H47.10 CHOKED DISC: Primary | ICD-10-CM

## 2024-01-31 ENCOUNTER — HOSPITAL ENCOUNTER (OUTPATIENT)
Age: 44
Discharge: HOME | End: 2024-02-03
Payer: MEDICARE

## 2024-01-31 ENCOUNTER — HOSPITAL ENCOUNTER (OUTPATIENT)
Age: 44
End: 2024-01-31
Payer: MEDICARE

## 2024-01-31 DIAGNOSIS — H47.10 CHOKED DISC: ICD-10-CM

## 2024-01-31 PROCEDURE — 6360000004 HC RX CONTRAST MEDICATION: Performed by: RADIOLOGY

## 2024-01-31 PROCEDURE — 70496 CT ANGIOGRAPHY HEAD: CPT

## 2024-01-31 RX ADMIN — IOPAMIDOL 100 ML: 755 INJECTION, SOLUTION INTRAVENOUS at 14:09

## 2024-02-04 DIAGNOSIS — M25.472 EFFUSION, LEFT ANKLE: ICD-10-CM

## 2024-02-05 RX ORDER — BLOOD SUGAR DIAGNOSTIC
STRIP MISCELLANEOUS
Qty: 100 STRIP | Refills: 1 | OUTPATIENT
Start: 2024-02-05

## 2024-02-05 RX ORDER — FUROSEMIDE 20 MG/1
TABLET ORAL
Qty: 90 TABLET | Refills: 1 | OUTPATIENT
Start: 2024-02-05

## 2024-03-11 DIAGNOSIS — M25.472 EFFUSION, LEFT ANKLE: ICD-10-CM

## 2024-03-12 RX ORDER — BLOOD SUGAR DIAGNOSTIC
STRIP MISCELLANEOUS
Qty: 100 STRIP | Refills: 1 | OUTPATIENT
Start: 2024-03-12

## 2024-03-12 RX ORDER — FUROSEMIDE 20 MG/1
TABLET ORAL
Qty: 90 TABLET | Refills: 1 | OUTPATIENT
Start: 2024-03-12

## 2024-06-03 RX ORDER — METFORMIN HYDROCHLORIDE 500 MG/1
TABLET, EXTENDED RELEASE ORAL
Qty: 360 TABLET | Refills: 1 | OUTPATIENT
Start: 2024-06-03

## 2024-06-05 LAB — MAMMOGRAPHY, EXTERNAL: NORMAL

## 2024-06-10 LAB — MAMMOGRAPHY, EXTERNAL: NORMAL

## 2024-06-11 ENCOUNTER — HOSPITAL ENCOUNTER (EMERGENCY)
Facility: HOSPITAL | Age: 44
Discharge: HOME | End: 2024-06-11
Attending: STUDENT IN AN ORGANIZED HEALTH CARE EDUCATION/TRAINING PROGRAM
Payer: MEDICARE

## 2024-06-11 VITALS
WEIGHT: 293 LBS | HEIGHT: 67 IN | RESPIRATION RATE: 20 BRPM | OXYGEN SATURATION: 96 % | SYSTOLIC BLOOD PRESSURE: 136 MMHG | TEMPERATURE: 98.7 F | BODY MASS INDEX: 45.99 KG/M2 | DIASTOLIC BLOOD PRESSURE: 87 MMHG | HEART RATE: 85 BPM

## 2024-06-11 DIAGNOSIS — J34.89 SINUS PRESSURE: Primary | ICD-10-CM

## 2024-06-11 PROCEDURE — 99282 EMERGENCY DEPT VISIT SF MDM: CPT

## 2024-06-11 ASSESSMENT — PAIN - FUNCTIONAL ASSESSMENT: PAIN_FUNCTIONAL_ASSESSMENT: NONE - DENIES PAIN

## 2024-06-12 ASSESSMENT — ENCOUNTER SYMPTOMS: SHORTNESS OF BREATH: 0

## 2024-06-12 NOTE — ED TRIAGE NOTES
Pt arrived ambulatory to ED with cc of ammonia in back of nose. Reports she has been smelling it for past 2 days. Reports she put dried cloves around house and thought it was that but she removed them and still smells it. Reports she now feels she may have sinus infection. Reports she is taking cough and cold medicine without relief. Denies fever or other cold symptoms. Reports she only feels it may be her sinuses because when she wakes in the morning she feels her right ear is full.

## 2024-06-12 NOTE — ED PROVIDER NOTES
Activity    Alcohol use: No     Alcohol/week: 0.0 standard drinks of alcohol    Drug use: No       SCREENINGS         Nessa Coma Scale  Eye Opening: Spontaneous  Best Verbal Response: Oriented  Best Motor Response: Obeys commands  [place] Coma Scale Score: 15                     CIWA Assessment  BP: 136/87  Pulse: 85                 PHYSICAL EXAM       ED Triage Vitals [06/11/24 2017]   BP Temp Temp Source Pulse Respirations SpO2 Height Weight - Scale   136/87 98.7 °F (37.1 °C) Oral 85 20 96 % 1.702 m (5' 7\") (!) 192.3 kg (424 lb)       Body mass index is 66.41 kg/m².    Physical Exam  Vitals and nursing note reviewed.   Constitutional:       Appearance: Normal appearance.   HENT:      Head: Normocephalic and atraumatic.      Right Ear: External ear normal.      Left Ear: External ear normal.      Nose: Nose normal.      Mouth/Throat:      Mouth: Mucous membranes are moist.   Eyes:      Extraocular Movements: Extraocular movements intact.      Conjunctiva/sclera: Conjunctivae normal.   Cardiovascular:      Rate and Rhythm: Normal rate and regular rhythm.      Pulses: Normal pulses.   Pulmonary:      Effort: Pulmonary effort is normal.      Breath sounds: Normal breath sounds.   Abdominal:      Palpations: Abdomen is soft.      Tenderness: There is no abdominal tenderness.   Musculoskeletal:         General: No tenderness. Normal range of motion.      Cervical back: Normal range of motion.   Skin:     General: Skin is warm and dry.   Neurological:      General: No focal deficit present.      Mental Status: She is alert and oriented to person, place, and time.   Psychiatric:         Mood and Affect: Mood normal.         Behavior: Behavior normal.         All other labs were within normal range or not returned as of this dictation.    EMERGENCY DEPARTMENT COURSE and DIFFERENTIAL DIAGNOSIS/MDM:   Vitals:    Vitals:    06/11/24 2017   BP: 136/87   Pulse: 85   Resp: 20   Temp: 98.7 °F (37.1 °C)   TempSrc: Oral   SpO2:

## 2024-09-04 ENCOUNTER — APPOINTMENT (OUTPATIENT)
Facility: HOSPITAL | Age: 44
End: 2024-09-04
Payer: MEDICARE

## 2024-09-04 ENCOUNTER — HOSPITAL ENCOUNTER (EMERGENCY)
Facility: HOSPITAL | Age: 44
Discharge: HOME | End: 2024-09-04
Attending: EMERGENCY MEDICINE
Payer: MEDICARE

## 2024-09-04 VITALS
OXYGEN SATURATION: 97 % | DIASTOLIC BLOOD PRESSURE: 97 MMHG | BODY MASS INDEX: 45.99 KG/M2 | HEIGHT: 67 IN | HEART RATE: 82 BPM | WEIGHT: 293 LBS | RESPIRATION RATE: 15 BRPM | TEMPERATURE: 98.2 F | SYSTOLIC BLOOD PRESSURE: 142 MMHG

## 2024-09-04 DIAGNOSIS — R06.02 SHORTNESS OF BREATH: ICD-10-CM

## 2024-09-04 DIAGNOSIS — U07.1 COVID-19 VIRUS INFECTION: Primary | ICD-10-CM

## 2024-09-04 LAB
ALBUMIN SERPL-MCNC: 3.8 G/DL (ref 3.5–5.2)
ALBUMIN/GLOB SERPL: 1 (ref 1.1–2.2)
ALP SERPL-CCNC: 73 U/L (ref 35–104)
ALT SERPL-CCNC: 14 U/L (ref 10–35)
ANION GAP SERPL CALC-SCNC: 12 MMOL/L (ref 5–15)
AST SERPL-CCNC: 18 U/L (ref 10–35)
BASOPHILS # BLD: 0.1 K/UL (ref 0–1)
BASOPHILS NFR BLD: 1 % (ref 0–1)
BILIRUB SERPL-MCNC: 0.2 MG/DL (ref 0.2–1)
BUN SERPL-MCNC: 9 MG/DL (ref 6–20)
BUN/CREAT SERPL: 13 (ref 12–20)
CALCIUM SERPL-MCNC: 8.9 MG/DL (ref 8.6–10)
CHLORIDE SERPL-SCNC: 103 MMOL/L (ref 98–107)
CO2 SERPL-SCNC: 24 MMOL/L (ref 22–29)
CREAT SERPL-MCNC: 0.68 MG/DL (ref 0.5–0.9)
DIFFERENTIAL METHOD BLD: ABNORMAL
EOSINOPHIL # BLD: 0.2 K/UL (ref 0–0.4)
EOSINOPHIL NFR BLD: 2 %
ERYTHROCYTE [DISTWIDTH] IN BLOOD BY AUTOMATED COUNT: 16.6 % (ref 11.5–14.5)
FLUAV RNA SPEC QL NAA+PROBE: NOT DETECTED
FLUBV RNA SPEC QL NAA+PROBE: NOT DETECTED
GLOBULIN SER CALC-MCNC: 3.8 G/DL (ref 2–4)
GLUCOSE SERPL-MCNC: 132 MG/DL (ref 65–100)
HCT VFR BLD AUTO: 31 % (ref 35–47)
HGB BLD-MCNC: 9.7 G/DL (ref 11.5–16)
IMM GRANULOCYTES # BLD AUTO: 0 K/UL (ref 0–0.04)
IMM GRANULOCYTES NFR BLD AUTO: 0 % (ref 0–0.5)
LYMPHOCYTES # BLD: 1.6 K/UL (ref 0.8–3.5)
LYMPHOCYTES NFR BLD: 21 % (ref 12–49)
MAGNESIUM SERPL-MCNC: 1.8 MG/DL (ref 1.6–2.6)
MCH RBC QN AUTO: 24.5 PG (ref 26–34)
MCHC RBC AUTO-ENTMCNC: 31.3 G/DL (ref 30–36.5)
MCV RBC AUTO: 78.3 FL (ref 80–99)
MONOCYTES # BLD: 0.6 K/UL (ref 0–1)
MONOCYTES NFR BLD: 8 % (ref 5–13)
NEUTS SEG # BLD: 5.2 K/UL (ref 1.8–8)
NEUTS SEG NFR BLD: 68 % (ref 32–75)
NRBC # BLD: 0 K/UL (ref 0–0.01)
NRBC BLD-RTO: 0 PER 100 WBC
NT PRO BNP: 265 PG/ML
PLATELET # BLD AUTO: 479 K/UL (ref 150–400)
PMV BLD AUTO: 8.4 FL (ref 8.9–12.9)
POTASSIUM SERPL-SCNC: 3.9 MMOL/L (ref 3.5–5.1)
PROT SERPL-MCNC: 7.6 G/DL (ref 6.4–8.3)
RBC # BLD AUTO: 3.96 M/UL (ref 3.8–5.2)
SARS-COV-2 RNA RESP QL NAA+PROBE: DETECTED
SODIUM SERPL-SCNC: 139 MMOL/L (ref 136–145)
SOURCE: ABNORMAL
TROPONIN T SERPL HS-MCNC: 6.1 NG/L (ref 0–14)
WBC # BLD AUTO: 7.6 K/UL (ref 3.6–11)

## 2024-09-04 PROCEDURE — 6370000000 HC RX 637 (ALT 250 FOR IP): Performed by: EMERGENCY MEDICINE

## 2024-09-04 PROCEDURE — 83880 ASSAY OF NATRIURETIC PEPTIDE: CPT

## 2024-09-04 PROCEDURE — 99285 EMERGENCY DEPT VISIT HI MDM: CPT

## 2024-09-04 PROCEDURE — 36415 COLL VENOUS BLD VENIPUNCTURE: CPT

## 2024-09-04 PROCEDURE — 85025 COMPLETE CBC W/AUTO DIFF WBC: CPT

## 2024-09-04 PROCEDURE — 71045 X-RAY EXAM CHEST 1 VIEW: CPT

## 2024-09-04 PROCEDURE — 83735 ASSAY OF MAGNESIUM: CPT

## 2024-09-04 PROCEDURE — 80053 COMPREHEN METABOLIC PANEL: CPT

## 2024-09-04 PROCEDURE — 93005 ELECTROCARDIOGRAM TRACING: CPT | Performed by: EMERGENCY MEDICINE

## 2024-09-04 PROCEDURE — 6360000002 HC RX W HCPCS: Performed by: EMERGENCY MEDICINE

## 2024-09-04 PROCEDURE — 87636 SARSCOV2 & INF A&B AMP PRB: CPT

## 2024-09-04 PROCEDURE — 84484 ASSAY OF TROPONIN QUANT: CPT

## 2024-09-04 RX ORDER — FLUTICASONE PROPIONATE 50 MCG
2 SPRAY, SUSPENSION (ML) NASAL DAILY
Qty: 32 G | Refills: 1 | Status: SHIPPED | OUTPATIENT
Start: 2024-09-04

## 2024-09-04 RX ORDER — ALBUTEROL SULFATE 90 UG/1
2 AEROSOL, METERED RESPIRATORY (INHALATION) 4 TIMES DAILY PRN
Qty: 18 G | Refills: 0 | Status: SHIPPED | OUTPATIENT
Start: 2024-09-04

## 2024-09-04 RX ORDER — CETIRIZINE HYDROCHLORIDE 10 MG/1
10 TABLET ORAL DAILY
Qty: 30 TABLET | Refills: 0 | Status: SHIPPED | OUTPATIENT
Start: 2024-09-04 | End: 2024-10-04

## 2024-09-04 RX ORDER — GUAIFENESIN 600 MG/1
1200 TABLET, EXTENDED RELEASE ORAL 2 TIMES DAILY
Qty: 40 TABLET | Refills: 0 | Status: SHIPPED | OUTPATIENT
Start: 2024-09-04 | End: 2024-09-14

## 2024-09-04 RX ADMIN — IPRATROPIUM BROMIDE AND ALBUTEROL SULFATE 1 DOSE: 2.5; .5 SOLUTION RESPIRATORY (INHALATION) at 06:50

## 2024-09-04 ASSESSMENT — LIFESTYLE VARIABLES
HOW MANY STANDARD DRINKS CONTAINING ALCOHOL DO YOU HAVE ON A TYPICAL DAY: PATIENT DOES NOT DRINK
HOW OFTEN DO YOU HAVE A DRINK CONTAINING ALCOHOL: NEVER

## 2024-09-04 NOTE — ED NOTES
Bedside and Verbal shift change report given to fAshan RN and LILLIE Toribio (oncoming nurse) by Nadine Pérez RN   (offgoing nurse). Report included the following information Adult Overview and Recent Results.

## 2024-09-04 NOTE — ED TRIAGE NOTES
Pt arrived to ED ambulatory with cc of congestion and shortness of breath in the mornings for one month. Reports the past two days it has seemed to get worse and she is having to take more quick breaths. Reports she has also had some nasal congestion and has been taking Samaria Mayer sinus relief. Endorses taking fluid pills and a new medication entresto. Denies CPAP or oxygen use at night. Denies fevers.     Mother in room reports patient has been having heart palpitations as well.

## 2024-09-04 NOTE — ED PROVIDER NOTES
Interval 350 ms    QTc Calculation (Bazett) 406 ms    P Axis 66 degrees    R Axis -32 degrees    T Axis 74 degrees    Diagnosis       Undetermined rhythm  Left axis deviation  Cannot rule out Anterior infarct , age undetermined  Abnormal ECG  When compared with ECG of 26-NOV-2023 21:42,  Current undetermined rhythm precludes rhythm comparison, needs review  T wave inversion no longer evident in Inferior leads            CONSULTS:  None    PROCEDURES:  Unless otherwise noted below, none     Procedures      FINAL IMPRESSION      1. COVID-19 virus infection    2. Shortness of breath          DISPOSITION/PLAN   DISPOSITION Decision To Discharge 09/04/2024 06:54:41 AM      PATIENT REFERRED TO:  Annette Marquez,   [address] [facility] [address] [phone]    Call       Your Cardiologist            DISCHARGE MEDICATIONS:  Discharge Medication List as of 9/4/2024  6:56 AM        START taking these medications    Details   cetirizine (ZYRTEC) 10 MG tablet Take 1 tablet by mouth daily, Disp-30 tablet, R-0Normal      fluticasone (FLONASE) 50 MCG/ACT nasal spray 2 sprays by Each Nostril route daily, Disp-32 g, R-1Normal      guaiFENesin (MUCINEX) 600 MG extended release tablet Take 2 tablets by mouth 2 times daily for 10 days, Disp-40 tablet, R-0Normal      albuterol sulfate HFA (VENTOLIN HFA) 108 (90 Base) MCG/ACT inhaler Inhale 2 puffs into the lungs 4 times daily as needed for Wheezing, Disp-18 g, R-0Normal      nirmatrelvir/ritonavir 300/100 (PAXLOVID, 300/100,) 20 x 150 MG & 10 x 100MG TBPK Take 3 tablets (two 150 mg nirmatrelvir and one 100 mg ritonavir tablets) by mouth every 12 hours for 5 days., Disp-30 tablet, R-0Normal               (Please note that portions of this note were completed with a voice recognition program.  Efforts were made to edit the dictations but occasionally words are mis-transcribed.)    Alexsander Marie MD (electronically signed)  Emergency Attending Physician            Frank

## 2024-09-04 NOTE — ED NOTES
Pt and pt's mother given discharge instructions, patient education, 5 prescriptions and follow up information. Pt verbalizes understanding. All questions answered. Pt discharged to home in private vehicle, ambulatory. Pt A&Ox4, RA, pain controlled.

## 2024-09-05 LAB
EKG ATRIAL RATE: 81 BPM
EKG DIAGNOSIS: NORMAL
EKG P AXIS: 66 DEGREES
EKG P-R INTERVAL: 144 MS
EKG Q-T INTERVAL: 350 MS
EKG QRS DURATION: 92 MS
EKG QTC CALCULATION (BAZETT): 406 MS
EKG R AXIS: -32 DEGREES
EKG T AXIS: 74 DEGREES
EKG VENTRICULAR RATE: 81 BPM

## 2024-09-05 PROCEDURE — 93010 ELECTROCARDIOGRAM REPORT: CPT | Performed by: SPECIALIST

## 2024-11-22 ENCOUNTER — OFFICE VISIT (OUTPATIENT)
Facility: CLINIC | Age: 44
End: 2024-11-22

## 2024-11-22 VITALS
SYSTOLIC BLOOD PRESSURE: 138 MMHG | WEIGHT: 293 LBS | HEART RATE: 92 BPM | BODY MASS INDEX: 45.99 KG/M2 | DIASTOLIC BLOOD PRESSURE: 86 MMHG | TEMPERATURE: 97.3 F | OXYGEN SATURATION: 95 % | HEIGHT: 67 IN

## 2024-11-22 DIAGNOSIS — E66.01 MORBID OBESITY: ICD-10-CM

## 2024-11-22 DIAGNOSIS — M79.601 RIGHT ARM PAIN: ICD-10-CM

## 2024-11-22 DIAGNOSIS — E11.69 TYPE 2 DIABETES MELLITUS WITH OTHER SPECIFIED COMPLICATION, WITHOUT LONG-TERM CURRENT USE OF INSULIN (HCC): ICD-10-CM

## 2024-11-22 DIAGNOSIS — Z00.00 MEDICARE ANNUAL WELLNESS VISIT, SUBSEQUENT: Primary | ICD-10-CM

## 2024-11-22 RX ORDER — FUROSEMIDE 20 MG/1
TABLET ORAL
Qty: 90 TABLET | Refills: 1 | Status: CANCELLED | OUTPATIENT
Start: 2024-11-22

## 2024-11-22 RX ORDER — TIRZEPATIDE 2.5 MG/.5ML
2.5 INJECTION, SOLUTION SUBCUTANEOUS WEEKLY
Qty: 2 ML | Refills: 3 | Status: SHIPPED | OUTPATIENT
Start: 2024-11-22

## 2024-11-22 SDOH — ECONOMIC STABILITY: FOOD INSECURITY: WITHIN THE PAST 12 MONTHS, THE FOOD YOU BOUGHT JUST DIDN'T LAST AND YOU DIDN'T HAVE MONEY TO GET MORE.: NEVER TRUE

## 2024-11-22 SDOH — ECONOMIC STABILITY: FOOD INSECURITY: WITHIN THE PAST 12 MONTHS, YOU WORRIED THAT YOUR FOOD WOULD RUN OUT BEFORE YOU GOT MONEY TO BUY MORE.: NEVER TRUE

## 2024-11-22 SDOH — ECONOMIC STABILITY: INCOME INSECURITY: HOW HARD IS IT FOR YOU TO PAY FOR THE VERY BASICS LIKE FOOD, HOUSING, MEDICAL CARE, AND HEATING?: NOT VERY HARD

## 2024-11-22 ASSESSMENT — PATIENT HEALTH QUESTIONNAIRE - PHQ9
SUM OF ALL RESPONSES TO PHQ9 QUESTIONS 1 & 2: 0
5. POOR APPETITE OR OVEREATING: NOT AT ALL
3. TROUBLE FALLING OR STAYING ASLEEP: NOT AT ALL
SUM OF ALL RESPONSES TO PHQ QUESTIONS 1-9: 0
SUM OF ALL RESPONSES TO PHQ QUESTIONS 1-9: 0
6. FEELING BAD ABOUT YOURSELF - OR THAT YOU ARE A FAILURE OR HAVE LET YOURSELF OR YOUR FAMILY DOWN: NOT AT ALL
10. IF YOU CHECKED OFF ANY PROBLEMS, HOW DIFFICULT HAVE THESE PROBLEMS MADE IT FOR YOU TO DO YOUR WORK, TAKE CARE OF THINGS AT HOME, OR GET ALONG WITH OTHER PEOPLE: NOT DIFFICULT AT ALL
SUM OF ALL RESPONSES TO PHQ QUESTIONS 1-9: 0
2. FEELING DOWN, DEPRESSED OR HOPELESS: NOT AT ALL
SUM OF ALL RESPONSES TO PHQ QUESTIONS 1-9: 0
1. LITTLE INTEREST OR PLEASURE IN DOING THINGS: NOT AT ALL
SUM OF ALL RESPONSES TO PHQ QUESTIONS 1-9: 0
SUM OF ALL RESPONSES TO PHQ QUESTIONS 1-9: 0
8. MOVING OR SPEAKING SO SLOWLY THAT OTHER PEOPLE COULD HAVE NOTICED. OR THE OPPOSITE, BEING SO FIGETY OR RESTLESS THAT YOU HAVE BEEN MOVING AROUND A LOT MORE THAN USUAL: NOT AT ALL
9. THOUGHTS THAT YOU WOULD BE BETTER OFF DEAD, OR OF HURTING YOURSELF: NOT AT ALL
SUM OF ALL RESPONSES TO PHQ9 QUESTIONS 1 & 2: 0
2. FEELING DOWN, DEPRESSED OR HOPELESS: NOT AT ALL
4. FEELING TIRED OR HAVING LITTLE ENERGY: NOT AT ALL
7. TROUBLE CONCENTRATING ON THINGS, SUCH AS READING THE NEWSPAPER OR WATCHING TELEVISION: NOT AT ALL
9. THOUGHTS THAT YOU WOULD BE BETTER OFF DEAD, OR OF HURTING YOURSELF: NOT AT ALL
6. FEELING BAD ABOUT YOURSELF - OR THAT YOU ARE A FAILURE OR HAVE LET YOURSELF OR YOUR FAMILY DOWN: NOT AT ALL
SUM OF ALL RESPONSES TO PHQ QUESTIONS 1-9: 0
5. POOR APPETITE OR OVEREATING: NOT AT ALL
1. LITTLE INTEREST OR PLEASURE IN DOING THINGS: NOT AT ALL
10. IF YOU CHECKED OFF ANY PROBLEMS, HOW DIFFICULT HAVE THESE PROBLEMS MADE IT FOR YOU TO DO YOUR WORK, TAKE CARE OF THINGS AT HOME, OR GET ALONG WITH OTHER PEOPLE: NOT DIFFICULT AT ALL
3. TROUBLE FALLING OR STAYING ASLEEP: NOT AT ALL
8. MOVING OR SPEAKING SO SLOWLY THAT OTHER PEOPLE COULD HAVE NOTICED. OR THE OPPOSITE, BEING SO FIGETY OR RESTLESS THAT YOU HAVE BEEN MOVING AROUND A LOT MORE THAN USUAL: NOT AT ALL
4. FEELING TIRED OR HAVING LITTLE ENERGY: NOT AT ALL
7. TROUBLE CONCENTRATING ON THINGS, SUCH AS READING THE NEWSPAPER OR WATCHING TELEVISION: NOT AT ALL
SUM OF ALL RESPONSES TO PHQ QUESTIONS 1-9: 0

## 2024-11-22 ASSESSMENT — LIFESTYLE VARIABLES
HOW OFTEN DO YOU HAVE A DRINK CONTAINING ALCOHOL: NEVER
HOW OFTEN DO YOU HAVE A DRINK CONTAINING ALCOHOL: NEVER
HOW MANY STANDARD DRINKS CONTAINING ALCOHOL DO YOU HAVE ON A TYPICAL DAY: PATIENT DOES NOT DRINK
HOW MANY STANDARD DRINKS CONTAINING ALCOHOL DO YOU HAVE ON A TYPICAL DAY: PATIENT DOES NOT DRINK

## 2024-11-22 NOTE — PROGRESS NOTES
Chief Complaint   Patient presents with    New Patient     \"Have you been to the ER, urgent care clinic since your last visit?  Hospitalized since your last visit?\"    NO    “Have you seen or consulted any other health care providers outside of [facility] since your last visit?”    NO     “Have you had a pap smear?”    NO    Date of last Cervical Cancer screen (HPV or PAP): 2/13/2019             Click Here for Release of Records Request

## 2024-11-22 NOTE — PROGRESS NOTES
Medicare Annual Wellness Visit    Raul Loja is here for New Patient, Arm Pain (Right arm pain for the past 3/4 months.), Weight Loss, and Medicare AWV    Assessment & Plan   Medicare annual wellness visit, subsequent  Type 2 diabetes mellitus with other specified complication, without long-term current use of insulin (HCC)  -     Hemoglobin A1C [LAB90]; Future  -     Lipid Panel; Future  -     Tirzepatide (MOUNJARO) 2.5 MG/0.5ML SOAJ; Inject 2.5 mg into the skin once a week, Disp-2 mL, R-3Normal  Right arm pain  -     diclofenac sodium (VOLTAREN) 1 % GEL; Apply 4 g topically 4 times daily, Topical, 4 TIMES DAILY Starting Fri 11/22/2024, Disp-100 g, R-2, Normal  -     [facility] Therapy at [facility]  Morbid obesity  -     Tirzepatide (MOUNJARO) 2.5 MG/0.5ML SOAJ; Inject 2.5 mg into the skin once a week, Disp-2 mL, R-3Normal    MDM  MWV completed today, please see below. Patient does have DM2, last A1C 6.2 on 5/17/23, will update lab work today. Continue metformin 500 mg bid, patient would prefer to keep the same dose for now. Will add on mounjaro to help with weight loss and Dm2. New problem of right arm pain which is likely MSK related with deltoid and rotator cuff muscles. Voltaren gel prescribed and PT referral placed. Follow up in 4 weeks for weight check.    Recommendations for Preventive Services Due: see orders and patient instructions/AVS.  Recommended screening schedule for the next 5-10 years is provided to the patient in written form: see Patient Instructions/AVS.     Return in 4 weeks (on 12/20/2024).     Subjective   Raul Loja is a 44 y.o. female with PMH of JOE on CPAP, trach, palpitations, former heavy smoker, MDD, borderline personality disorder, GEO presenting today for establishing care. Used to follow with Dr. Marie. Follows with Sveta Lozano MD for OBGYN and pap smear was last year per patient, records requested. Today, patient would also like to

## 2024-11-22 NOTE — PROGRESS NOTES
Family Medicine Initial Office Visit  Patient: Raul Loja  1980, 44 y.o., female  Encounter Date: 2024      CHIEF COMPLAINT  Chief Complaint   Patient presents with    New Patient    Arm Pain     Right arm pain for the past 3/4 months.    Annual Exam    Weight Loss       SUBJECTIVE  Raul Loja is a 44 y.o. female with PMH of JOE on CPAP, trach, palpitations, former heavy smoker, MDD, borderline personality disorder, GEO presenting today for establishing care. Used to follow with Dr. Marie.  Current meds: ***    BP Readings from Last 3 Encounters:   24 (!) 141/90   24 (!) 142/97   24 136/87       Weight loss:  -has changed her eating habits but cannot lose weight  -her and her mom has been baking food and doing home cooked meals instead; avoiding fried food   -has talked about bariatric surgery but does not want to pursue it yet  -wants to walk with her mom but her afraid of gun violence in her city  -works from home, starting in home work outs with mom  -was never on weight loss medications  Wt Readings from Last 3 Encounters:   24 (!) 197.3 kg (435 lb)   24 (!) 199.6 kg (440 lb)   24 (!) 192.3 kg (424 lb)     Pap smear last year  Sveta Lozano MD  [address]    Right arm pain  3-4 months  Biceps to shoulder blader  Weaker, can't  as much   Lftig it hurts  Laying on it no trauma   +numbness or tingling just today, hand started going numb  Dull ,achey, can be throbbing  No swelling   No medicines  Alecia did work    Review of Systems     Social History     Tobacco Use   Smoking Status Former    Current packs/day: 0.00    Types: Cigarettes    Quit date: 2013    Years since quittin.8   Smokeless Tobacco Never     Social History     Substance and Sexual Activity   Alcohol Use No    Alcohol/week: 0.0 standard drinks of alcohol     Social History     Substance and Sexual Activity   Drug Use No     Social History     Substance and Sexual

## 2024-11-23 ENCOUNTER — TELEPHONE (OUTPATIENT)
Facility: CLINIC | Age: 44
End: 2024-11-23

## 2024-11-23 NOTE — TELEPHONE ENCOUNTER
[facility]     Received call from patient via on call service at 9:49 PM     Subjective: Caller states \"my stomach just started hurting\"     Current Symptoms: Patient called on call service stating her stomach hurt and she had a fever of 101. Pt described stomach pain as \"annoying\" but not severe. She denies any nausea, vomiting, diarrhea, radiating pain.      Onset: within the last few hours. States she ate [employer] chicken nuggets and fries earlier that day.     Pain Severity: mild     Temperature:  101     What has been tried: nothing     LMP:    Pregnant: denies     Recommended disposition: Recommended patient to take tylenol and drink water. Patient was concerned that she needed to go to the ER. Advised patient that she had no acute red flag symptoms and that she could currently be managed at home with supportive care.      Care advice provided, patient verbalizes understanding; denies any other questions or concerns.    Patient/Caller agrees with recommended disposition      UPDATE:  Patient called back on call service at 11:18 PM stating that fever is up to 103. Stomach discomfort still mild and NO associated symptoms.   Pt admitted she had not taken any tylenol nor had she drank any fluids. Pt states she was \"scared\" to drink water that it may make her \"too cold.\"  She asked whether it was time to go to the ER.  Reassured patient that symptoms can be managed at home. Reinstructed to take tylenol and drink fluids, popsicles.  Advised patient could seek care at [facility] in the morning if she wanted further assessment at that time.    Thank you,  Eloise Anthony, APRN - CNP    [address]  [address] [place] [address]  Tel: [phone]  Fax: [phone]

## 2024-12-10 ENCOUNTER — TELEPHONE (OUTPATIENT)
Facility: CLINIC | Age: 44
End: 2024-12-10

## 2024-12-10 NOTE — TELEPHONE ENCOUNTER
Lab orders placed. 11/22/2024.    Please call pt, and ask that female have labs drawn prior to scheduled appt, so results can be discussed at that time.

## 2024-12-31 ENCOUNTER — PATIENT MESSAGE (OUTPATIENT)
Facility: CLINIC | Age: 44
End: 2024-12-31

## 2024-12-31 DIAGNOSIS — M25.472 EFFUSION, LEFT ANKLE: ICD-10-CM

## 2025-01-02 RX ORDER — BLOOD SUGAR DIAGNOSTIC
STRIP MISCELLANEOUS
Qty: 100 STRIP | Refills: 1 | Status: SHIPPED | OUTPATIENT
Start: 2025-01-02

## 2025-01-02 RX ORDER — FUROSEMIDE 20 MG/1
20 TABLET ORAL PRN
Qty: 90 TABLET | Refills: 1 | Status: SHIPPED | OUTPATIENT
Start: 2025-01-02

## 2025-01-02 RX ORDER — LANCETS 33 GAUGE
EACH MISCELLANEOUS
Qty: 100 EACH | Refills: 9 | Status: SHIPPED | OUTPATIENT
Start: 2025-01-02

## 2025-01-02 RX ORDER — HYDROCHLOROTHIAZIDE 25 MG/1
25 TABLET ORAL DAILY
Qty: 30 TABLET | Refills: 0 | Status: SHIPPED | OUTPATIENT
Start: 2025-01-02 | End: 2025-02-01

## 2025-01-02 RX ORDER — METFORMIN HYDROCHLORIDE 500 MG/1
500 TABLET, EXTENDED RELEASE ORAL
Qty: 360 TABLET | Refills: 1 | Status: SHIPPED | OUTPATIENT
Start: 2025-01-02

## 2025-01-02 RX ORDER — FLUTICASONE PROPIONATE 50 MCG
2 SPRAY, SUSPENSION (ML) NASAL DAILY
Qty: 32 G | Refills: 1 | Status: SHIPPED | OUTPATIENT
Start: 2025-01-02

## 2025-01-22 ENCOUNTER — TELEPHONE (OUTPATIENT)
Facility: CLINIC | Age: 45
End: 2025-01-22

## 2025-01-22 NOTE — TELEPHONE ENCOUNTER
Pt's A1C was elevated on last set of labs drawn.     New orders placed, 11/22/224.     Can we call and encourage pt to have labs drawn, so we know updated readings, thank you.

## 2025-01-27 ENCOUNTER — APPOINTMENT (OUTPATIENT)
Facility: HOSPITAL | Age: 45
End: 2025-01-27
Payer: MEDICARE

## 2025-01-27 ENCOUNTER — HOSPITAL ENCOUNTER (EMERGENCY)
Facility: HOSPITAL | Age: 45
Discharge: HOME | End: 2025-01-28
Attending: EMERGENCY MEDICINE
Payer: MEDICARE

## 2025-01-27 DIAGNOSIS — E86.0 DEHYDRATION: ICD-10-CM

## 2025-01-27 DIAGNOSIS — R11.2 NAUSEA AND VOMITING, UNSPECIFIED VOMITING TYPE: ICD-10-CM

## 2025-01-27 DIAGNOSIS — B34.9 VIRAL SYNDROME: Primary | ICD-10-CM

## 2025-01-27 LAB
ALBUMIN SERPL-MCNC: 4 G/DL (ref 3.5–5.2)
ALBUMIN/GLOB SERPL: 1 (ref 1.1–2.2)
ALP SERPL-CCNC: 67 U/L (ref 35–104)
ALT SERPL-CCNC: 19 U/L (ref 10–35)
ANION GAP SERPL CALC-SCNC: 14 MMOL/L (ref 2–12)
APPEARANCE UR: ABNORMAL
AST SERPL-CCNC: 19 U/L (ref 10–35)
BACTERIA URNS QL MICRO: ABNORMAL /HPF
BASOPHILS # BLD: 0.04 K/UL (ref 0–0.1)
BASOPHILS NFR BLD: 0.2 % (ref 0–1)
BILIRUB SERPL-MCNC: 0.5 MG/DL (ref 0.2–1)
BILIRUB UR QL: NEGATIVE
BUN SERPL-MCNC: 14 MG/DL (ref 6–20)
BUN/CREAT SERPL: 15 (ref 12–20)
CALCIUM SERPL-MCNC: 9.4 MG/DL (ref 8.6–10)
CHLORIDE SERPL-SCNC: 97 MMOL/L (ref 98–107)
CO2 SERPL-SCNC: 23 MMOL/L (ref 22–29)
COLOR UR: ABNORMAL
CREAT SERPL-MCNC: 0.92 MG/DL (ref 0.5–0.9)
DIFFERENTIAL METHOD BLD: ABNORMAL
EOSINOPHIL # BLD: 0.02 K/UL (ref 0–0.4)
EOSINOPHIL NFR BLD: 0.1 % (ref 0–7)
EPITH CASTS URNS QL MICRO: ABNORMAL /LPF
ERYTHROCYTE [DISTWIDTH] IN BLOOD BY AUTOMATED COUNT: 16.7 % (ref 11.5–14.5)
FLUAV RNA SPEC QL NAA+PROBE: NOT DETECTED
FLUBV RNA SPEC QL NAA+PROBE: NOT DETECTED
GLOBULIN SER CALC-MCNC: 4 G/DL (ref 2–4)
GLUCOSE SERPL-MCNC: 174 MG/DL (ref 65–100)
GLUCOSE UR STRIP.AUTO-MCNC: NEGATIVE MG/DL
HCT VFR BLD AUTO: 30 % (ref 35–47)
HGB BLD-MCNC: 9.4 G/DL (ref 11.5–16)
HGB UR QL STRIP: NEGATIVE
IMM GRANULOCYTES # BLD AUTO: 0.13 K/UL (ref 0–0.04)
IMM GRANULOCYTES NFR BLD AUTO: 0.7 % (ref 0–0.5)
KETONES UR QL STRIP.AUTO: NEGATIVE MG/DL
LACTATE SERPL-SCNC: 2.3 MMOL/L (ref 0.4–2)
LACTATE SERPL-SCNC: 2.9 MMOL/L (ref 0.4–2)
LEUKOCYTE ESTERASE UR QL STRIP.AUTO: NEGATIVE
LYMPHOCYTES # BLD: 0.55 K/UL (ref 0.8–3.5)
LYMPHOCYTES NFR BLD: 2.9 % (ref 12–49)
MCH RBC QN AUTO: 24 PG (ref 26–34)
MCHC RBC AUTO-ENTMCNC: 31.3 G/DL (ref 30–36.5)
MCV RBC AUTO: 76.5 FL (ref 80–99)
MONOCYTES # BLD: 0.7 K/UL (ref 0–1)
MONOCYTES NFR BLD: 3.7 % (ref 5–13)
NEUTS SEG # BLD: 17.37 K/UL (ref 1.8–8)
NEUTS SEG NFR BLD: 92.4 % (ref 32–75)
NITRITE UR QL STRIP.AUTO: NEGATIVE
NRBC # BLD: 0 K/UL (ref 0–0.01)
NRBC BLD-RTO: 0 PER 100 WBC
PH UR STRIP: 7 (ref 5–8)
PLATELET # BLD AUTO: 493 K/UL (ref 150–400)
PMV BLD AUTO: 8.7 FL (ref 8.9–12.9)
POTASSIUM SERPL-SCNC: 3.8 MMOL/L (ref 3.5–5.1)
PROT SERPL-MCNC: 8 G/DL (ref 6.4–8.3)
PROT UR STRIP-MCNC: NEGATIVE MG/DL
RBC # BLD AUTO: 3.92 M/UL (ref 3.8–5.2)
RBC #/AREA URNS HPF: ABNORMAL /HPF
RBC MORPH BLD: ABNORMAL
SARS-COV-2 RNA RESP QL NAA+PROBE: NOT DETECTED
SODIUM SERPL-SCNC: 134 MMOL/L (ref 136–145)
SOURCE: NORMAL
SP GR UR REFRACTOMETRY: 1.01 (ref 1–1.03)
SPECIMEN HOLD: NORMAL
UROBILINOGEN UR QL STRIP.AUTO: 0.2 EU/DL (ref 0.2–1)
WBC # BLD AUTO: 18.8 K/UL (ref 3.6–11)
WBC URNS QL MICRO: ABNORMAL /HPF (ref 0–4)

## 2025-01-27 PROCEDURE — 83605 ASSAY OF LACTIC ACID: CPT

## 2025-01-27 PROCEDURE — 96361 HYDRATE IV INFUSION ADD-ON: CPT

## 2025-01-27 PROCEDURE — 81001 URINALYSIS AUTO W/SCOPE: CPT

## 2025-01-27 PROCEDURE — 99284 EMERGENCY DEPT VISIT MOD MDM: CPT

## 2025-01-27 PROCEDURE — 36415 COLL VENOUS BLD VENIPUNCTURE: CPT

## 2025-01-27 PROCEDURE — 87636 SARSCOV2 & INF A&B AMP PRB: CPT

## 2025-01-27 PROCEDURE — 85025 COMPLETE CBC W/AUTO DIFF WBC: CPT

## 2025-01-27 PROCEDURE — 71046 X-RAY EXAM CHEST 2 VIEWS: CPT

## 2025-01-27 PROCEDURE — 74176 CT ABD & PELVIS W/O CONTRAST: CPT

## 2025-01-27 PROCEDURE — 2580000003 HC RX 258: Performed by: EMERGENCY MEDICINE

## 2025-01-27 PROCEDURE — 6370000000 HC RX 637 (ALT 250 FOR IP): Performed by: EMERGENCY MEDICINE

## 2025-01-27 PROCEDURE — 96360 HYDRATION IV INFUSION INIT: CPT

## 2025-01-27 PROCEDURE — 80053 COMPREHEN METABOLIC PANEL: CPT

## 2025-01-27 RX ORDER — ACETAMINOPHEN 500 MG
1000 TABLET ORAL
Status: COMPLETED | OUTPATIENT
Start: 2025-01-27 | End: 2025-01-27

## 2025-01-27 RX ORDER — 0.9 % SODIUM CHLORIDE 0.9 %
1000 INTRAVENOUS SOLUTION INTRAVENOUS ONCE
Status: COMPLETED | OUTPATIENT
Start: 2025-01-27 | End: 2025-01-28

## 2025-01-27 RX ORDER — 0.9 % SODIUM CHLORIDE 0.9 %
1000 INTRAVENOUS SOLUTION INTRAVENOUS ONCE
Status: COMPLETED | OUTPATIENT
Start: 2025-01-27 | End: 2025-01-27

## 2025-01-27 RX ORDER — IBUPROFEN 800 MG/1
800 TABLET, FILM COATED ORAL
Status: COMPLETED | OUTPATIENT
Start: 2025-01-27 | End: 2025-01-27

## 2025-01-27 RX ADMIN — IBUPROFEN 800 MG: 800 TABLET, FILM COATED ORAL at 19:57

## 2025-01-27 RX ADMIN — SODIUM CHLORIDE 1000 ML: 9 INJECTION, SOLUTION INTRAVENOUS at 21:25

## 2025-01-27 RX ADMIN — ACETAMINOPHEN 1000 MG: 500 TABLET ORAL at 19:57

## 2025-01-27 RX ADMIN — SODIUM CHLORIDE 1000 ML: 9 INJECTION, SOLUTION INTRAVENOUS at 23:20

## 2025-01-27 ASSESSMENT — PAIN DESCRIPTION - LOCATION: LOCATION: GENERALIZED

## 2025-01-27 ASSESSMENT — PAIN SCALES - GENERAL: PAINLEVEL_OUTOF10: 8

## 2025-01-27 ASSESSMENT — PAIN - FUNCTIONAL ASSESSMENT: PAIN_FUNCTIONAL_ASSESSMENT: 0-10

## 2025-01-27 ASSESSMENT — PAIN DESCRIPTION - DESCRIPTORS: DESCRIPTORS: ACHING

## 2025-01-28 VITALS
OXYGEN SATURATION: 95 % | HEIGHT: 67 IN | SYSTOLIC BLOOD PRESSURE: 119 MMHG | TEMPERATURE: 99.5 F | DIASTOLIC BLOOD PRESSURE: 73 MMHG | WEIGHT: 293 LBS | HEART RATE: 100 BPM | RESPIRATION RATE: 22 BRPM | BODY MASS INDEX: 45.99 KG/M2

## 2025-01-28 LAB — LACTATE SERPL-SCNC: 1.5 MMOL/L (ref 0.4–2)

## 2025-01-28 PROCEDURE — 96361 HYDRATE IV INFUSION ADD-ON: CPT

## 2025-01-28 PROCEDURE — 83605 ASSAY OF LACTIC ACID: CPT

## 2025-01-28 PROCEDURE — 36415 COLL VENOUS BLD VENIPUNCTURE: CPT

## 2025-01-28 RX ORDER — IBUPROFEN 600 MG/1
600 TABLET, FILM COATED ORAL EVERY 6 HOURS PRN
Qty: 28 TABLET | Refills: 0 | Status: SHIPPED | OUTPATIENT
Start: 2025-01-28 | End: 2025-02-04

## 2025-01-28 RX ORDER — ACETAMINOPHEN 325 MG/1
650 TABLET ORAL EVERY 6 HOURS PRN
Qty: 120 TABLET | Refills: 3 | Status: SHIPPED | OUTPATIENT
Start: 2025-01-28

## 2025-01-28 RX ORDER — ONDANSETRON 4 MG/1
4 TABLET, ORALLY DISINTEGRATING ORAL EVERY 8 HOURS PRN
Qty: 30 TABLET | Refills: 0 | Status: SHIPPED | OUTPATIENT
Start: 2025-01-28

## 2025-01-28 RX ORDER — FAMOTIDINE 20 MG/1
20 TABLET, FILM COATED ORAL 2 TIMES DAILY
Qty: 30 TABLET | Refills: 0 | Status: SHIPPED | OUTPATIENT
Start: 2025-01-28

## 2025-01-28 NOTE — ED TRIAGE NOTES
Patient pushed to triage by mother. C/O lower back pain, chills, fevers, N/V and bodyaches. Highest fever was 104. Having urinary frequency. Took milk of magnesia and that's when all these symptoms appeared. No meds PTA.

## 2025-01-28 NOTE — ED NOTES
ED Course as of 01/28/25 2213 Mon Jan 27, 2025   [id number] Signout received from Dr. Melgar.  Patient pending labs and imaging  In summary:    [ZD]      ED Course User Index  [ZD] Alexsander Marie MD   On reevaluation patient is feeling significantly better.  No evidence of pneumonia urinary tract infection Pelvis with no acute abnormalities.  Patient reports that she had sudden onset nausea vomiting diarrhea.  However with treatment in ER patient is feeling better.  Did have leukocytosis likely demargination and some dehydration.  This is improved.  Patient stable for discharge    Discussed the discharge impression and any labs and the results with the patient. Answered any questions and addressed any concerns. Discussed the importance of following up with their primary care provider and/or specialist.  Discussed signs or symptoms that would warrant return back to the ER for further evaluation. The patient is agreeable with discharge.        Vitals:    01/28/25 0025 01/28/25 0045 01/28/25 0100 01/28/25 0114   BP: 100/66 108/72 119/64 119/73   Pulse:       Resp:       Temp:       TempSrc:       SpO2: 97% 98% 92% 95%   Weight:       Height:               Results for orders placed or performed during the hospital encounter of 01/27/25   COVID-19 & Influenza Combo    Specimen: Nasopharyngeal   Result Value Ref Range    Source Nasopharyngeal      SARS-CoV-2, PCR Not detected NOTD      Rapid Influenza A By PCR Not detected NOTD      Rapid Influenza B By PCR Not detected NOTD     Urine Culture Hold Sample    Specimen: Urine   Result Value Ref Range    Specimen HOld        Urine on hold in Microbiology dept for 2 days.  If unpreserved urine is submitted, it cannot be used for addtional testing after 24 hours, recollection will be required.   Urinalysis with Microscopic   Result Value Ref Range    Color, UA YELLOW/STRAW      Appearance HAZY (A) CLEAR      Specific Gravity, UA 1.015 1.003 - 1.030      pH, Urine 7.0 5.0 - 8.0

## 2025-01-30 ENCOUNTER — TELEPHONE (OUTPATIENT)
Facility: CLINIC | Age: 45
End: 2025-01-30

## 2025-02-07 ENCOUNTER — PATIENT MESSAGE (OUTPATIENT)
Facility: CLINIC | Age: 45
End: 2025-02-07

## 2025-02-07 RX ORDER — GUAIFENESIN 600 MG/1
600 TABLET, EXTENDED RELEASE ORAL 2 TIMES DAILY
Qty: 30 TABLET | Refills: 0 | Status: SHIPPED | OUTPATIENT
Start: 2025-02-07 | End: 2025-02-22

## 2025-02-07 RX ORDER — CETIRIZINE HYDROCHLORIDE 10 MG/1
10 TABLET ORAL DAILY
Qty: 30 TABLET | Refills: 0 | Status: SHIPPED | OUTPATIENT
Start: 2025-02-07

## 2025-02-07 RX ORDER — ALBUTEROL SULFATE 90 UG/1
2 INHALANT RESPIRATORY (INHALATION) 4 TIMES DAILY PRN
Qty: 18 G | Refills: 0 | Status: SHIPPED | OUTPATIENT
Start: 2025-02-07

## 2025-02-14 ENCOUNTER — TELEPHONE (OUTPATIENT)
Facility: CLINIC | Age: 45
End: 2025-02-14

## 2025-02-14 NOTE — PROGRESS NOTES
Received on page from patient today at 5:15. Patient called stating that she is still recovering from Covid and has a trach since 2013. Patient states that when she coughs, she notices some specs of blood and small blood clots in sputum. She states this has happened before when she has been sick and feels that the trach causes some irritation. Patient is wondering today if she should go to the emergency room or if this is considered normal. She denies any fevers, chest pain,excessive fatigue.

## 2025-02-15 NOTE — TELEPHONE ENCOUNTER
Received on call page from patient today at 5:15p. Patient called stating that she is still recovering from Covid and has a trach since 2013. Patient states that when she coughs, she notices some specs of blood and small blood clots in sputum. She states this has happened before when she has been sick and feels that the trach causes some irritation. Patient is wondering today if she should go to the emergency room or if this is considered normal. She denies any fevers, chest pain,excessive fatigue.    Given that patient is feeling well with no other symptoms, advised supportive home care and continued monitoring. Discussed methods to soothe sore throat.     Red flags reviewed & discussed with the her.  She verbalized understanding.    Thank you,  Eloise Anthony, GALE - CNP    [address]  [address] [place] [address]  Tel: [phone]  Fax: [phone]

## 2025-03-03 ENCOUNTER — TELEPHONE (OUTPATIENT)
Facility: CLINIC | Age: 45
End: 2025-03-03

## 2025-03-03 NOTE — TELEPHONE ENCOUNTER
Pt's A1C elevated at last check.     No scheduled appt on file.     Can we call and encourage pt to have labs drawn, placed 11/2024.

## 2025-03-04 RX ORDER — CETIRIZINE HYDROCHLORIDE 10 MG/1
10 TABLET ORAL DAILY
Qty: 90 TABLET | Refills: 1 | Status: SHIPPED | OUTPATIENT
Start: 2025-03-04

## 2025-04-01 RX ORDER — METFORMIN HYDROCHLORIDE 500 MG/1
500 TABLET, EXTENDED RELEASE ORAL 2 TIMES DAILY
Qty: 360 TABLET | Refills: 1 | Status: SHIPPED | OUTPATIENT
Start: 2025-04-01

## 2025-04-16 ENCOUNTER — PATIENT MESSAGE (OUTPATIENT)
Facility: CLINIC | Age: 45
End: 2025-04-16

## 2025-05-21 ENCOUNTER — APPOINTMENT (OUTPATIENT)
Facility: HOSPITAL | Age: 45
End: 2025-05-21
Payer: MEDICARE

## 2025-05-21 ENCOUNTER — HOSPITAL ENCOUNTER (EMERGENCY)
Facility: HOSPITAL | Age: 45
Discharge: HOME | End: 2025-05-21
Attending: STUDENT IN AN ORGANIZED HEALTH CARE EDUCATION/TRAINING PROGRAM
Payer: MEDICARE

## 2025-05-21 VITALS
WEIGHT: 293 LBS | TEMPERATURE: 99.3 F | DIASTOLIC BLOOD PRESSURE: 77 MMHG | OXYGEN SATURATION: 99 % | BODY MASS INDEX: 45.99 KG/M2 | HEIGHT: 67 IN | SYSTOLIC BLOOD PRESSURE: 123 MMHG | HEART RATE: 77 BPM | RESPIRATION RATE: 16 BRPM

## 2025-05-21 DIAGNOSIS — D64.9 ANEMIA, UNSPECIFIED TYPE: ICD-10-CM

## 2025-05-21 DIAGNOSIS — R07.89 CHEST DISCOMFORT: Primary | ICD-10-CM

## 2025-05-21 DIAGNOSIS — D75.839 THROMBOCYTOSIS: ICD-10-CM

## 2025-05-21 LAB
ALBUMIN SERPL-MCNC: 4 G/DL (ref 3.5–5.2)
ALBUMIN/GLOB SERPL: 0.9 (ref 1.1–2.2)
ALP SERPL-CCNC: 80 U/L (ref 35–104)
ALT SERPL-CCNC: 16 U/L (ref 10–35)
ANION GAP SERPL CALC-SCNC: 12 MMOL/L (ref 2–12)
AST SERPL-CCNC: 21 U/L (ref 10–35)
BASOPHILS # BLD: 0.04 K/UL (ref 0–0.1)
BASOPHILS NFR BLD: 0.5 % (ref 0–1)
BILIRUB SERPL-MCNC: 0.2 MG/DL (ref 0.2–1)
BUN SERPL-MCNC: 11 MG/DL (ref 6–20)
BUN/CREAT SERPL: 14 (ref 12–20)
CALCIUM SERPL-MCNC: 9.2 MG/DL (ref 8.6–10)
CHLORIDE SERPL-SCNC: 99 MMOL/L (ref 98–107)
CO2 SERPL-SCNC: 25 MMOL/L (ref 22–29)
CREAT SERPL-MCNC: 0.77 MG/DL (ref 0.5–0.9)
DIFFERENTIAL METHOD BLD: ABNORMAL
EOSINOPHIL # BLD: 0.11 K/UL (ref 0–0.4)
EOSINOPHIL NFR BLD: 1.5 % (ref 0–7)
ERYTHROCYTE [DISTWIDTH] IN BLOOD BY AUTOMATED COUNT: 17.7 % (ref 11.5–14.5)
GLOBULIN SER CALC-MCNC: 4.4 G/DL (ref 2–4)
GLUCOSE SERPL-MCNC: 109 MG/DL (ref 65–100)
HCT VFR BLD AUTO: 31.3 % (ref 35–47)
HGB BLD-MCNC: 9.8 G/DL (ref 11.5–16)
IMM GRANULOCYTES # BLD AUTO: 0.01 K/UL (ref 0–0.04)
IMM GRANULOCYTES NFR BLD AUTO: 0.1 % (ref 0–0.5)
LYMPHOCYTES # BLD: 1.9 K/UL (ref 0.8–3.5)
LYMPHOCYTES NFR BLD: 25.7 % (ref 12–49)
MCH RBC QN AUTO: 23.8 PG (ref 26–34)
MCHC RBC AUTO-ENTMCNC: 31.3 G/DL (ref 30–36.5)
MCV RBC AUTO: 76.2 FL (ref 80–99)
MONOCYTES # BLD: 0.54 K/UL (ref 0–1)
MONOCYTES NFR BLD: 7.3 % (ref 5–13)
NEUTS SEG # BLD: 4.79 K/UL (ref 1.8–8)
NEUTS SEG NFR BLD: 64.9 % (ref 32–75)
NRBC # BLD: 0 K/UL (ref 0–0.01)
NRBC BLD-RTO: 0 PER 100 WBC
PLATELET # BLD AUTO: 543 K/UL (ref 150–400)
PMV BLD AUTO: 8.5 FL (ref 8.9–12.9)
POTASSIUM SERPL-SCNC: 4.3 MMOL/L (ref 3.5–5.1)
PROT SERPL-MCNC: 8.4 G/DL (ref 6.4–8.3)
RBC # BLD AUTO: 4.11 M/UL (ref 3.8–5.2)
SODIUM SERPL-SCNC: 136 MMOL/L (ref 136–145)
TROPONIN T SERPL HS-MCNC: <6 NG/L (ref 0–14)
TROPONIN T SERPL HS-MCNC: <6 NG/L (ref 0–14)
WBC # BLD AUTO: 7.4 K/UL (ref 3.6–11)

## 2025-05-21 PROCEDURE — 84484 ASSAY OF TROPONIN QUANT: CPT

## 2025-05-21 PROCEDURE — 71046 X-RAY EXAM CHEST 2 VIEWS: CPT

## 2025-05-21 PROCEDURE — 99285 EMERGENCY DEPT VISIT HI MDM: CPT

## 2025-05-21 PROCEDURE — 85025 COMPLETE CBC W/AUTO DIFF WBC: CPT

## 2025-05-21 PROCEDURE — 93005 ELECTROCARDIOGRAM TRACING: CPT | Performed by: STUDENT IN AN ORGANIZED HEALTH CARE EDUCATION/TRAINING PROGRAM

## 2025-05-21 PROCEDURE — 36415 COLL VENOUS BLD VENIPUNCTURE: CPT

## 2025-05-21 PROCEDURE — 80053 COMPREHEN METABOLIC PANEL: CPT

## 2025-05-21 ASSESSMENT — PAIN - FUNCTIONAL ASSESSMENT: PAIN_FUNCTIONAL_ASSESSMENT: NONE - DENIES PAIN

## 2025-05-21 ASSESSMENT — ENCOUNTER SYMPTOMS
SHORTNESS OF BREATH: 0
COUGH: 0

## 2025-05-21 NOTE — ED PROVIDER NOTES
?bone    Cancer Other         great aunt - not sure what type of cancer    No Known Problems Father     Heart Disease Maternal Grandmother     Hypertension Mother     Diabetes Mother           SOCIAL HISTORY       Social History     Socioeconomic History    Marital status: Single   Tobacco Use    Smoking status: Former     Current packs/day: 0.00     Types: Cigarettes     Quit date: 2013     Years since quittin.3    Smokeless tobacco: Never   Vaping Use    Vaping status: Never Used   Substance and Sexual Activity    Alcohol use: No     Alcohol/week: 0.0 standard drinks of alcohol    Drug use: No    Sexual activity: Defer     Social Drivers of Health     Financial Resource Strain: Low Risk  (2024)    Overall Financial Resource Strain (CARDIA)     Difficulty of Paying Living Expenses: Not very hard   Food Insecurity: No Food Insecurity (2024)    Hunger Vital Sign     Worried About Running Out of Food in the Last Year: Never true     Ran Out of Food in the Last Year: Never true   Transportation Needs: Unknown (2024)    PRAPARE - Transportation     Lack of Transportation (Non-Medical): No   Physical Activity: Inactive (2024)    Exercise Vital Sign     Days of Exercise per Week: 0 days     Minutes of Exercise per Session: 0 min   Housing Stability: Unknown (2024)    Housing Stability Vital Sign     Homeless in the Last Year: No           PHYSICAL EXAM       ED Triage Vitals   BP Systolic BP Percentile Diastolic BP Percentile Temp Temp Source Pulse Respirations SpO2   25 172 -- -- 25 1725 25 1725 25 1725 25 1725 25 1725   (!) 142/91   99.3 °F (37.4 °C) Oral 67 16 99 %      Height Weight - Scale         25 1725 25 1738         1.702 m (5' 7\") (!) 190.1 kg (419 lb)             Body mass index is 65.62 kg/m².    Physical Exam  Vitals and nursing note reviewed.   Constitutional:       General: She is not in acute distress.     Appearance:

## 2025-05-21 NOTE — ED TRIAGE NOTES
Pt here from home via POV for palpitations and chest discomfort, SOB since 1600 today, also endorses congestion for 2-3 weeks. PMHx trach 2/2 sleep apnea, tachycardia (sees Dr. Carr) and take metoprolol, HTN, DM2 w/ metformin, and a daily ASA. Denies chest pain. Denies cough. At 1600 pt took ASA 325mg and HCTZ 12.5mg when she started feeling symptoms.

## 2025-05-23 LAB
EKG ATRIAL RATE: 66 BPM
EKG DIAGNOSIS: NORMAL
EKG P AXIS: 45 DEGREES
EKG P-R INTERVAL: 130 MS
EKG Q-T INTERVAL: 410 MS
EKG QRS DURATION: 100 MS
EKG QTC CALCULATION (BAZETT): 429 MS
EKG R AXIS: -23 DEGREES
EKG T AXIS: 12 DEGREES
EKG VENTRICULAR RATE: 66 BPM

## 2025-05-23 PROCEDURE — 93010 ELECTROCARDIOGRAM REPORT: CPT | Performed by: SPECIALIST

## 2025-05-26 ENCOUNTER — PATIENT MESSAGE (OUTPATIENT)
Facility: CLINIC | Age: 45
End: 2025-05-26

## 2025-05-27 RX ORDER — FLUTICASONE PROPIONATE 50 MCG
2 SPRAY, SUSPENSION (ML) NASAL DAILY
Qty: 32 G | Refills: 1 | Status: SHIPPED | OUTPATIENT
Start: 2025-05-27

## 2025-05-27 RX ORDER — METFORMIN HYDROCHLORIDE 500 MG/1
1000 TABLET, EXTENDED RELEASE ORAL 2 TIMES DAILY
Qty: 360 TABLET | Refills: 1 | Status: SHIPPED | OUTPATIENT
Start: 2025-05-27

## 2025-05-27 RX ORDER — HYDROCHLOROTHIAZIDE 25 MG/1
25 TABLET ORAL DAILY
Qty: 30 TABLET | Refills: 0 | Status: SHIPPED | OUTPATIENT
Start: 2025-05-27 | End: 2025-06-26

## 2025-06-03 ENCOUNTER — PATIENT MESSAGE (OUTPATIENT)
Facility: CLINIC | Age: 45
End: 2025-06-03

## 2025-06-07 ENCOUNTER — HOSPITAL ENCOUNTER (EMERGENCY)
Facility: HOSPITAL | Age: 45
Discharge: HOME | End: 2025-06-07
Attending: EMERGENCY MEDICINE
Payer: MEDICARE

## 2025-06-07 VITALS
HEIGHT: 67 IN | BODY MASS INDEX: 45.99 KG/M2 | OXYGEN SATURATION: 95 % | SYSTOLIC BLOOD PRESSURE: 156 MMHG | HEART RATE: 90 BPM | DIASTOLIC BLOOD PRESSURE: 84 MMHG | RESPIRATION RATE: 18 BRPM | TEMPERATURE: 99.1 F | WEIGHT: 293 LBS

## 2025-06-07 DIAGNOSIS — N39.0 URINARY TRACT INFECTION WITH HEMATURIA, SITE UNSPECIFIED: Primary | ICD-10-CM

## 2025-06-07 DIAGNOSIS — R31.9 URINARY TRACT INFECTION WITH HEMATURIA, SITE UNSPECIFIED: Primary | ICD-10-CM

## 2025-06-07 LAB
APPEARANCE UR: ABNORMAL
BACTERIA URNS QL MICRO: ABNORMAL /HPF
BILIRUB UR QL: NEGATIVE
COLOR UR: ABNORMAL
EPITH CASTS URNS QL MICRO: ABNORMAL /LPF
GLUCOSE UR STRIP.AUTO-MCNC: NEGATIVE MG/DL
HCG, URINE, POC: NEGATIVE
HGB UR QL STRIP: ABNORMAL
KETONES UR QL STRIP.AUTO: NEGATIVE MG/DL
LEUKOCYTE ESTERASE UR QL STRIP.AUTO: NEGATIVE
Lab: NORMAL
MUCOUS THREADS URNS QL MICRO: ABNORMAL /LPF
NEGATIVE QC PASS/FAIL: NORMAL
NITRITE UR QL STRIP.AUTO: NEGATIVE
PH UR STRIP: 6 (ref 5–8)
POSITIVE QC PASS/FAIL: NORMAL
PROT UR STRIP-MCNC: NEGATIVE MG/DL
RBC #/AREA URNS HPF: ABNORMAL /HPF
SP GR UR REFRACTOMETRY: 1.02 (ref 1–1.03)
SPECIMEN HOLD: NORMAL
UROBILINOGEN UR QL STRIP.AUTO: 0.2 EU/DL (ref 0.2–1)
WBC URNS QL MICRO: ABNORMAL /HPF (ref 0–4)

## 2025-06-07 PROCEDURE — 81001 URINALYSIS AUTO W/SCOPE: CPT

## 2025-06-07 PROCEDURE — 87086 URINE CULTURE/COLONY COUNT: CPT

## 2025-06-07 PROCEDURE — 87147 CULTURE TYPE IMMUNOLOGIC: CPT

## 2025-06-07 PROCEDURE — 99283 EMERGENCY DEPT VISIT LOW MDM: CPT

## 2025-06-07 RX ORDER — NITROFURANTOIN 25; 75 MG/1; MG/1
100 CAPSULE ORAL 2 TIMES DAILY
Qty: 10 CAPSULE | Refills: 0 | Status: SHIPPED | OUTPATIENT
Start: 2025-06-07 | End: 2025-06-12

## 2025-06-07 ASSESSMENT — PAIN DESCRIPTION - LOCATION: LOCATION: ABDOMEN

## 2025-06-07 ASSESSMENT — PAIN SCALES - GENERAL: PAINLEVEL_OUTOF10: 3

## 2025-06-07 ASSESSMENT — PAIN - FUNCTIONAL ASSESSMENT: PAIN_FUNCTIONAL_ASSESSMENT: 0-10

## 2025-06-07 ASSESSMENT — PAIN DESCRIPTION - ORIENTATION: ORIENTATION: LOWER

## 2025-06-07 NOTE — ED PROVIDER NOTES
[place] EMERGENCY DEPARTMENT  EMERGENCY DEPARTMENT ENCOUNTER      Pt Name: Raul Loja  MRN: [id number]  Birthdate 1980  Date of evaluation: 6/7/2025  Provider: Kamari Curran MD      HISTORY OF PRESENT ILLNESS      HPI  44-year-old female with a past medical history of sleep apnea requiring tracheostomy presenting to the emergency department due to concerns for UTI.  She has had symptoms for about 2 weeks.  She endorses some intermittent suprapubic pressure and cramping.  She says when she urinates she feels like she cannot completely empty her bladder.  Has felt discomfort when urinating as well.  No hematuria.  No flank pain.  No nausea or vomiting.  No fevers or chills.  She has had similar symptoms in the past secondary to UTI.  Denies any abnormal uterine bleeding.  No vaginal discharge.      Nursing Notes were reviewed.    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[facility])     for severe sleep apnea          SURGICAL HISTORY       Past Surgical History:   Procedure Laterality Date    GYN  2002    removal of benign ovarian cyst - per pt done in OR, under anesthesia, but states had no incisions    TRACHEOSTOMY  8/26/13    Due to sleep apnea         CURRENT MEDICATIONS       Previous Medications    ACETAMINOPHEN (AMINOFEN) 325 MG TABLET    Take 2 tablets by mouth every 6 hours as needed for Pain    ALBUTEROL SULFATE HFA (VENTOLIN HFA) 108 (90 BASE) MCG/ACT INHALER    Inhale 2 puffs into the lungs 4 times daily as needed for Wheezing    ASPIRIN 325 MG EC TABLET    Take by mouth every 6 hours as needed    AZELASTINE (ASTELIN) 0.1 % NASAL

## 2025-06-07 NOTE — ED TRIAGE NOTES
Pt here from home via POV, states has had s/s for about 2 weeks including a lot of lower abdominal pressure, cramping, feeling that after she urinates she still has contents in her bladder, urgency. Hasn't had a UTI in years and this feels like a UTI, was unable to get into PCP.

## 2025-06-07 NOTE — DISCHARGE INSTRUCTIONS
Return to the ER with any new or worsening symptoms.  You can use MyChart to follow-up on your urine culture.  If no growth on urine culture and symptoms not improving you should discuss with your primary care doctor about potentially having bladder spasms and starting oxybutynin

## 2025-06-08 ENCOUNTER — RESULTS FOLLOW-UP (OUTPATIENT)
Facility: HOSPITAL | Age: 45
End: 2025-06-08

## 2025-06-08 LAB
BACTERIA SPEC CULT: ABNORMAL
CC UR VC: ABNORMAL
SERVICE CMNT-IMP: ABNORMAL

## 2025-06-11 SDOH — ECONOMIC STABILITY: FOOD INSECURITY: WITHIN THE PAST 12 MONTHS, YOU WORRIED THAT YOUR FOOD WOULD RUN OUT BEFORE YOU GOT MONEY TO BUY MORE.: NEVER TRUE

## 2025-06-11 SDOH — ECONOMIC STABILITY: INCOME INSECURITY: IN THE LAST 12 MONTHS, WAS THERE A TIME WHEN YOU WERE NOT ABLE TO PAY THE MORTGAGE OR RENT ON TIME?: NO

## 2025-06-11 SDOH — ECONOMIC STABILITY: FOOD INSECURITY: WITHIN THE PAST 12 MONTHS, THE FOOD YOU BOUGHT JUST DIDN'T LAST AND YOU DIDN'T HAVE MONEY TO GET MORE.: NEVER TRUE

## 2025-06-11 SDOH — ECONOMIC STABILITY: TRANSPORTATION INSECURITY
IN THE PAST 12 MONTHS, HAS THE LACK OF TRANSPORTATION KEPT YOU FROM MEDICAL APPOINTMENTS OR FROM GETTING MEDICATIONS?: NO

## 2025-06-11 SDOH — ECONOMIC STABILITY: TRANSPORTATION INSECURITY
IN THE PAST 12 MONTHS, HAS LACK OF TRANSPORTATION KEPT YOU FROM MEETINGS, WORK, OR FROM GETTING THINGS NEEDED FOR DAILY LIVING?: NO

## 2025-06-13 ENCOUNTER — TELEMEDICINE (OUTPATIENT)
Facility: CLINIC | Age: 45
End: 2025-06-13

## 2025-06-13 DIAGNOSIS — E11.9 TYPE 2 DIABETES MELLITUS WITHOUT COMPLICATION, WITHOUT LONG-TERM CURRENT USE OF INSULIN (HCC): ICD-10-CM

## 2025-06-13 DIAGNOSIS — N30.00 ACUTE CYSTITIS WITHOUT HEMATURIA: Primary | ICD-10-CM

## 2025-06-13 PROBLEM — E66.813 OBESITY, CLASS 3 (HCC): Status: ACTIVE | Noted: 2025-06-13

## 2025-06-13 SDOH — ECONOMIC STABILITY: FOOD INSECURITY: WITHIN THE PAST 12 MONTHS, THE FOOD YOU BOUGHT JUST DIDN'T LAST AND YOU DIDN'T HAVE MONEY TO GET MORE.: NEVER TRUE

## 2025-06-13 SDOH — ECONOMIC STABILITY: FOOD INSECURITY: WITHIN THE PAST 12 MONTHS, YOU WORRIED THAT YOUR FOOD WOULD RUN OUT BEFORE YOU GOT MONEY TO BUY MORE.: NEVER TRUE

## 2025-06-13 ASSESSMENT — PATIENT HEALTH QUESTIONNAIRE - PHQ9
2. FEELING DOWN, DEPRESSED OR HOPELESS: NOT AT ALL
5. POOR APPETITE OR OVEREATING: NOT AT ALL
7. TROUBLE CONCENTRATING ON THINGS, SUCH AS READING THE NEWSPAPER OR WATCHING TELEVISION: NOT AT ALL
9. THOUGHTS THAT YOU WOULD BE BETTER OFF DEAD, OR OF HURTING YOURSELF: NOT AT ALL
3. TROUBLE FALLING OR STAYING ASLEEP: NOT AT ALL
1. LITTLE INTEREST OR PLEASURE IN DOING THINGS: NOT AT ALL
SUM OF ALL RESPONSES TO PHQ QUESTIONS 1-9: 0
SUM OF ALL RESPONSES TO PHQ QUESTIONS 1-9: 0
4. FEELING TIRED OR HAVING LITTLE ENERGY: NOT AT ALL
SUM OF ALL RESPONSES TO PHQ QUESTIONS 1-9: 0
8. MOVING OR SPEAKING SO SLOWLY THAT OTHER PEOPLE COULD HAVE NOTICED. OR THE OPPOSITE, BEING SO FIGETY OR RESTLESS THAT YOU HAVE BEEN MOVING AROUND A LOT MORE THAN USUAL: NOT AT ALL
10. IF YOU CHECKED OFF ANY PROBLEMS, HOW DIFFICULT HAVE THESE PROBLEMS MADE IT FOR YOU TO DO YOUR WORK, TAKE CARE OF THINGS AT HOME, OR GET ALONG WITH OTHER PEOPLE: NOT DIFFICULT AT ALL
SUM OF ALL RESPONSES TO PHQ QUESTIONS 1-9: 0
6. FEELING BAD ABOUT YOURSELF - OR THAT YOU ARE A FAILURE OR HAVE LET YOURSELF OR YOUR FAMILY DOWN: NOT AT ALL

## 2025-06-13 NOTE — PROGRESS NOTES
Chief Complaint   Patient presents with    Urinary Tract Infection       Subjective: Patient presented today virtually for UTI symptoms.  Patient stated she went to the ER 06/07/2025 & had urine tested.      \"Have you been to the ER, urgent care clinic since your last visit?  Hospitalized since your last visit?\"    YES - When: approximately 7 days ago.  Where and Why: [place] [facility], UTI.    “Have you seen or consulted any other health care providers outside of [facility] since your last visit?”    NO     “Have you had a pap smear?”    NO    Date of last Cervical Cancer screen (HPV or PAP): 2/13/2019             Click Here for Release of Records Request  
applicable) and other individuals in attendance with the patient were advised that Artificial Intelligence will be utilized during this visit to record, process the conversation to generate a clinical note, and support improvement of the AI technology. The patient (or guardian, if applicable) and other individuals in attendance at the appointment consented to the use of AI, including the recording.          On this date 06/13/25 I have spent 30 minutes reviewing previous notes, test results and face to face (virtual) with the patient discussing the diagnosis and importance of compliance with the treatment plan as well as documenting on the day of the visit.

## 2025-06-20 RX ORDER — HYDROCHLOROTHIAZIDE 25 MG/1
25 TABLET ORAL DAILY
Qty: 90 TABLET | Refills: 1 | Status: SHIPPED | OUTPATIENT
Start: 2025-06-20

## 2025-07-02 DIAGNOSIS — M25.472 EFFUSION, LEFT ANKLE: ICD-10-CM

## 2025-07-02 RX ORDER — FUROSEMIDE 20 MG/1
20 TABLET ORAL PRN
Qty: 90 TABLET | Refills: 1 | Status: SHIPPED | OUTPATIENT
Start: 2025-07-02

## 2025-07-29 ENCOUNTER — TELEPHONE (OUTPATIENT)
Facility: CLINIC | Age: 45
End: 2025-07-29

## 2025-07-29 ENCOUNTER — HOSPITAL ENCOUNTER (EMERGENCY)
Facility: HOSPITAL | Age: 45
Discharge: HOME | End: 2025-07-29
Attending: EMERGENCY MEDICINE
Payer: MEDICARE

## 2025-07-29 VITALS
BODY MASS INDEX: 45.99 KG/M2 | DIASTOLIC BLOOD PRESSURE: 79 MMHG | WEIGHT: 293 LBS | HEIGHT: 67 IN | SYSTOLIC BLOOD PRESSURE: 153 MMHG | TEMPERATURE: 98.8 F | HEART RATE: 81 BPM | OXYGEN SATURATION: 98 % | RESPIRATION RATE: 20 BRPM

## 2025-07-29 DIAGNOSIS — G43.909 MIGRAINE WITHOUT STATUS MIGRAINOSUS, NOT INTRACTABLE, UNSPECIFIED MIGRAINE TYPE: Primary | ICD-10-CM

## 2025-07-29 LAB
ANION GAP SERPL CALC-SCNC: 13 MMOL/L (ref 2–12)
BASOPHILS # BLD: 0.02 K/UL (ref 0–0.1)
BASOPHILS NFR BLD: 0.2 % (ref 0–1)
BUN SERPL-MCNC: 11 MG/DL (ref 6–20)
BUN/CREAT SERPL: 15 (ref 12–20)
CALCIUM SERPL-MCNC: 8.9 MG/DL (ref 8.6–10)
CHLORIDE SERPL-SCNC: 100 MMOL/L (ref 98–107)
CO2 SERPL-SCNC: 24 MMOL/L (ref 22–29)
COMMENT:: NORMAL
CREAT SERPL-MCNC: 0.75 MG/DL (ref 0.5–0.9)
DIFFERENTIAL METHOD BLD: ABNORMAL
EOSINOPHIL # BLD: 0.14 K/UL (ref 0–0.4)
EOSINOPHIL NFR BLD: 1.7 % (ref 0–7)
ERYTHROCYTE [DISTWIDTH] IN BLOOD BY AUTOMATED COUNT: 17.3 % (ref 11.5–14.5)
GLUCOSE SERPL-MCNC: 104 MG/DL (ref 65–100)
HCT VFR BLD AUTO: 29.6 % (ref 35–47)
HGB BLD-MCNC: 9.2 G/DL (ref 11.5–16)
IMM GRANULOCYTES # BLD AUTO: 0.03 K/UL (ref 0–0.04)
IMM GRANULOCYTES NFR BLD AUTO: 0.4 % (ref 0–0.5)
LYMPHOCYTES # BLD: 1.53 K/UL (ref 0.8–3.5)
LYMPHOCYTES NFR BLD: 18.8 % (ref 12–49)
MCH RBC QN AUTO: 23.2 PG (ref 26–34)
MCHC RBC AUTO-ENTMCNC: 31.1 G/DL (ref 30–36.5)
MCV RBC AUTO: 74.6 FL (ref 80–99)
MONOCYTES # BLD: 0.63 K/UL (ref 0–1)
MONOCYTES NFR BLD: 7.7 % (ref 5–13)
NEUTS SEG # BLD: 5.8 K/UL (ref 1.8–8)
NEUTS SEG NFR BLD: 71.2 % (ref 32–75)
NRBC # BLD: 0 K/UL (ref 0–0.01)
NRBC BLD-RTO: 0 PER 100 WBC
PLATELET # BLD AUTO: 510 K/UL (ref 150–400)
PMV BLD AUTO: 8.8 FL (ref 8.9–12.9)
POTASSIUM SERPL-SCNC: 4.5 MMOL/L (ref 3.5–5.1)
RBC # BLD AUTO: 3.97 M/UL (ref 3.8–5.2)
SODIUM SERPL-SCNC: 137 MMOL/L (ref 136–145)
SPECIMEN HOLD: NORMAL
WBC # BLD AUTO: 8.2 K/UL (ref 3.6–11)

## 2025-07-29 PROCEDURE — 36415 COLL VENOUS BLD VENIPUNCTURE: CPT

## 2025-07-29 PROCEDURE — 85025 COMPLETE CBC W/AUTO DIFF WBC: CPT

## 2025-07-29 PROCEDURE — 80048 BASIC METABOLIC PNL TOTAL CA: CPT

## 2025-07-29 PROCEDURE — 99284 EMERGENCY DEPT VISIT MOD MDM: CPT

## 2025-07-29 PROCEDURE — 93005 ELECTROCARDIOGRAM TRACING: CPT | Performed by: EMERGENCY MEDICINE

## 2025-07-29 ASSESSMENT — PAIN - FUNCTIONAL ASSESSMENT: PAIN_FUNCTIONAL_ASSESSMENT: 0-10

## 2025-07-29 ASSESSMENT — PAIN DESCRIPTION - DESCRIPTORS: DESCRIPTORS: TINGLING

## 2025-07-29 ASSESSMENT — PAIN DESCRIPTION - LOCATION: LOCATION: FACE

## 2025-07-29 ASSESSMENT — PAIN SCALES - GENERAL: PAINLEVEL_OUTOF10: 2

## 2025-07-29 ASSESSMENT — PAIN DESCRIPTION - ORIENTATION: ORIENTATION: LEFT

## 2025-07-29 NOTE — DISCHARGE INSTRUCTIONS
DIAGNOSIS: Possible complex migraine. A complex migraine can cause numbness, tingling, or weakness in parts of your body, along with a headache. Your symptoms included numbness in your left cheek and a headache, but these have now resolved.    WHAT WAS DONE IN THE EMERGENCY DEPARTMENT: We checked your vital signs, did a full physical and neurological exam, and reviewed your medical history. We also did blood tests to check for other causes of your symptoms. Your exam was normal, and your blood tests were within normal limits except for a slightly low hemoglobin, which is not an emergency.    OTHER DIAGNOSES CONSIDERED: We checked for signs of stroke, infection, or other serious problems. Your exam and test results did not show any signs of these conditions.    POSSIBLE CAUSE: Complex migraines can sometimes be triggered by certain foods, stress, or changes in your routine. You mentioned eating salty foods before your symptoms started, which may have been a trigger.    WHAT TO EXPECT: Your symptoms have resolved. Most people with complex migraines recover fully, but sometimes symptoms can return. It is important to avoid known triggers if possible.    TREATMENT AND HOME CARE:   - You did not need any special treatment in the emergency department today.  - Rest and drink plenty of fluids.  - Take your regular medications as prescribed.  - Avoid foods or activities that you think may trigger your symptoms.  - Keep your tracheostomy clean and follow your usual care routine for your sleep apnea.    FOLLOW-UP:   - Please follow up with your primary care doctor or neurologist within 1 week for further evaluation and to discuss your migraine symptoms.  - Bring this visit summary with you to your appointment.    RETURN TO THE EMERGENCY DEPARTMENT IF YOU HAVE:  - Numbness or weakness that does not go away  - Trouble speaking, understanding words, or swallowing  - Sudden vision changes  - Severe headache that is different from

## 2025-07-29 NOTE — TELEPHONE ENCOUNTER
----- Message from Princess SCHUMACHER sent at 7/29/2025  2:50 PM EDT -----  Regarding: ECC Escalation To Practice  ECC Escalation To Practice      Type of Escalation: Red Flag Symptom  --------------------------------------------------------------------------------------------------------------------------    Information for Provider:  Patient is looking for appointment for: Symptom Numbness   Reasons for Message: Unable to reach practice     Additional Information : Patient called to schedule a virtual visit about setting up an appointment with a Neurologist , she went to the ER this morning for the numbness on her left cheek it's not as severe last night but it is still numb and she has also a headache that comes and go.   --------------------------------------------------------------------------------------------------------------------------    Relationship to Patient: Self  Call Back Info: OK to leave message on [place]  Preferred Call Back Number: Phone [phone]

## 2025-07-29 NOTE — ED NOTES
Pt given discharge instructions, prescriptions, and pt education. Pt instructed to follow-up w PCP. Pt verbalizes understanding and all questions answered. Pt ambulatory out of ER accompanied with steady gait. IV taken out prior to DC.

## 2025-07-29 NOTE — ED TRIAGE NOTES
Pt ambulated to ED with mother.  Pt reports having numbness to L cheek yesterday and remains to have the numbness.  Pt reports having HTN last night and took medications as ordered along with aspirin.  Pt states waking up this morning with continued L cheek numbness and body feeling heavy.  Pt presents with trach breathing normal per patient.  Pt denies other sensation changes or  changes.    HX:  Tachycardia.

## 2025-07-29 NOTE — ED PROVIDER NOTES
[facility] EMERGENCY DEPARTMENT  EMERGENCY DEPARTMENT ENCOUNTER      Pt Name: Raul Loja  MRN: [id number]  Birthdate 1980  Date of evaluation: 7/29/2025  Provider: Reinaldo Velasco MD    CHIEF COMPLAINT       Chief Complaint   Patient presents with    Numbness         HISTORY OF PRESENT ILLNESS   (Location/Symptom, Timing/Onset, Context/Setting, Quality, Duration, Modifying Factors, Severity)  Note limiting factors.   45-year-old female with a history of sleep apnea managed with tracheostomy presented with numbness in the left lower cheek that began last night and persisted, now resolved. This morning she experienced generalized fatigue, which resolved prior to evaluation. She reports intermittent posterior headache, now resolved and having occurred twice previously at home, but denies facial droop, memory loss, dysphagia, or speech difficulty.  She suspects ingestion of salty meat or noodles as a trigger her symptoms. She denies a history of similar headaches of this type, though she has headaches otherwise.            Review of External Medical Records:     Nursing Notes were reviewed.    REVIEW OF SYSTEMS    (2-9 systems for level 4, 10 or more for level 5)     Review of Systems    Except as noted above the remainder of the review of systems was reviewed and negative.       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

## 2025-08-01 LAB
EKG ATRIAL RATE: 80 BPM
EKG DIAGNOSIS: NORMAL
EKG P AXIS: 54 DEGREES
EKG P-R INTERVAL: 120 MS
EKG Q-T INTERVAL: 374 MS
EKG QRS DURATION: 98 MS
EKG QTC CALCULATION (BAZETT): 431 MS
EKG R AXIS: -23 DEGREES
EKG T AXIS: 17 DEGREES
EKG VENTRICULAR RATE: 80 BPM

## 2025-08-01 PROCEDURE — 93010 ELECTROCARDIOGRAM REPORT: CPT | Performed by: SPECIALIST

## 2025-08-19 ENCOUNTER — OFFICE VISIT (OUTPATIENT)
Age: 45
End: 2025-08-19
Payer: MEDICARE

## 2025-08-19 VITALS
HEIGHT: 67 IN | BODY MASS INDEX: 45.99 KG/M2 | DIASTOLIC BLOOD PRESSURE: 91 MMHG | SYSTOLIC BLOOD PRESSURE: 145 MMHG | WEIGHT: 293 LBS

## 2025-08-19 DIAGNOSIS — N64.52 BLOODY DISCHARGE FROM RIGHT NIPPLE: Primary | ICD-10-CM

## 2025-08-19 PROBLEM — I42.9 CARDIOMYOPATHY (HCC): Status: ACTIVE | Noted: 2024-08-02

## 2025-08-19 PROCEDURE — 99204 OFFICE O/P NEW MOD 45 MIN: CPT | Performed by: OBSTETRICS & GYNECOLOGY

## 2025-08-25 ENCOUNTER — TELEPHONE (OUTPATIENT)
Facility: CLINIC | Age: 45
End: 2025-08-25

## 2025-08-25 RX ORDER — FLUTICASONE PROPIONATE 50 MCG
2 SPRAY, SUSPENSION (ML) NASAL DAILY
Qty: 48 ML | Refills: 1 | Status: SHIPPED | OUTPATIENT
Start: 2025-08-25

## 2025-08-26 ENCOUNTER — CLINICAL DOCUMENTATION (OUTPATIENT)
Age: 45
End: 2025-08-26